# Patient Record
Sex: FEMALE | Race: BLACK OR AFRICAN AMERICAN | NOT HISPANIC OR LATINO | URBAN - METROPOLITAN AREA
[De-identification: names, ages, dates, MRNs, and addresses within clinical notes are randomized per-mention and may not be internally consistent; named-entity substitution may affect disease eponyms.]

---

## 2017-01-01 ENCOUNTER — COMMUNICATION - HEALTHEAST (OUTPATIENT)
Dept: NURSING | Facility: CLINIC | Age: 32
End: 2017-01-01

## 2017-01-01 ENCOUNTER — HOME CARE/HOSPICE - HEALTHEAST (OUTPATIENT)
Dept: HOME HEALTH SERVICES | Facility: HOME HEALTH | Age: 32
End: 2017-01-01

## 2017-01-01 ENCOUNTER — RECORDS - HEALTHEAST (OUTPATIENT)
Dept: ADMINISTRATIVE | Facility: OTHER | Age: 32
End: 2017-01-01

## 2017-01-01 ENCOUNTER — COMMUNICATION - HEALTHEAST (OUTPATIENT)
Dept: HOME HEALTH SERVICES | Facility: HOME HEALTH | Age: 32
End: 2017-01-01

## 2017-01-01 ENCOUNTER — SURGERY - HEALTHEAST (OUTPATIENT)
Dept: GASTROENTEROLOGY | Facility: CLINIC | Age: 32
End: 2017-01-01

## 2017-01-01 ENCOUNTER — OFFICE VISIT - HEALTHEAST (OUTPATIENT)
Dept: BEHAVIORAL HEALTH | Facility: HOSPITAL | Age: 32
End: 2017-01-01

## 2017-01-01 ENCOUNTER — COMMUNICATION - HEALTHEAST (OUTPATIENT)
Dept: INTERNAL MEDICINE | Facility: CLINIC | Age: 32
End: 2017-01-01

## 2017-01-01 ENCOUNTER — COMMUNICATION - HEALTHEAST (OUTPATIENT)
Dept: ADMINISTRATIVE | Facility: CLINIC | Age: 32
End: 2017-01-01

## 2017-01-01 ENCOUNTER — COMMUNICATION - HEALTHEAST (OUTPATIENT)
Dept: ENDOCRINOLOGY | Facility: CLINIC | Age: 32
End: 2017-01-01

## 2017-01-01 ENCOUNTER — OFFICE VISIT - HEALTHEAST (OUTPATIENT)
Dept: ENDOCRINOLOGY | Facility: CLINIC | Age: 32
End: 2017-01-01

## 2017-01-01 ENCOUNTER — AMBULATORY - HEALTHEAST (OUTPATIENT)
Dept: ENDOCRINOLOGY | Facility: CLINIC | Age: 32
End: 2017-01-01

## 2017-01-01 ENCOUNTER — COMMUNICATION - HEALTHEAST (OUTPATIENT)
Dept: CARE COORDINATION | Facility: CLINIC | Age: 32
End: 2017-01-01

## 2017-01-01 ENCOUNTER — AMBULATORY - HEALTHEAST (OUTPATIENT)
Dept: LAB | Facility: CLINIC | Age: 32
End: 2017-01-01

## 2017-01-01 ENCOUNTER — COMMUNICATION - HEALTHEAST (OUTPATIENT)
Dept: BEHAVIORAL HEALTH | Facility: CLINIC | Age: 32
End: 2017-01-01

## 2017-01-01 ENCOUNTER — AMBULATORY - HEALTHEAST (OUTPATIENT)
Dept: PODIATRY | Facility: CLINIC | Age: 32
End: 2017-01-01

## 2017-01-01 ENCOUNTER — OFFICE VISIT - HEALTHEAST (OUTPATIENT)
Dept: INTERNAL MEDICINE | Facility: CLINIC | Age: 32
End: 2017-01-01

## 2017-01-01 ENCOUNTER — AMBULATORY - HEALTHEAST (OUTPATIENT)
Dept: CARE COORDINATION | Facility: CLINIC | Age: 32
End: 2017-01-01

## 2017-01-01 ENCOUNTER — AMBULATORY - HEALTHEAST (OUTPATIENT)
Dept: BEHAVIORAL HEALTH | Facility: CLINIC | Age: 32
End: 2017-01-01

## 2017-01-01 ENCOUNTER — AMBULATORY - HEALTHEAST (OUTPATIENT)
Dept: INTERNAL MEDICINE | Facility: CLINIC | Age: 32
End: 2017-01-01

## 2017-01-01 DIAGNOSIS — F33.1 MAJOR DEPRESSIVE DISORDER, RECURRENT EPISODE, MODERATE (H): ICD-10-CM

## 2017-01-01 DIAGNOSIS — Z00.00 HEALTHCARE MAINTENANCE: ICD-10-CM

## 2017-01-01 DIAGNOSIS — R19.7 DIARRHEA: ICD-10-CM

## 2017-01-01 DIAGNOSIS — E10.22 TYPE 1 DIABETES MELLITUS WITH CHRONIC KIDNEY DISEASE ON CHRONIC DIALYSIS (H): ICD-10-CM

## 2017-01-01 DIAGNOSIS — F43.23 ADJUSTMENT DISORDER WITH MIXED ANXIETY AND DEPRESSED MOOD: ICD-10-CM

## 2017-01-01 DIAGNOSIS — E10.8 TYPE 1 DIABETES MELLITUS WITH COMPLICATION (H): ICD-10-CM

## 2017-01-01 DIAGNOSIS — R19.7 DIARRHEA, UNSPECIFIED TYPE: ICD-10-CM

## 2017-01-01 DIAGNOSIS — H54.7 VISUAL IMPAIRMENT DUE TO DIABETES MELLITUS (H): ICD-10-CM

## 2017-01-01 DIAGNOSIS — E10.21 TYPE 1 DIABETES MELLITUS WITH NEPHROPATHY (H): ICD-10-CM

## 2017-01-01 DIAGNOSIS — E55.9 VITAMIN D DEFICIENCY: ICD-10-CM

## 2017-01-01 DIAGNOSIS — L60.2 ONYCHAUXIS: ICD-10-CM

## 2017-01-01 DIAGNOSIS — E10.42 DIABETIC PERIPHERAL NEUROPATHY ASSOCIATED WITH TYPE 1 DIABETES MELLITUS (H): ICD-10-CM

## 2017-01-01 DIAGNOSIS — G25.81 RLS (RESTLESS LEGS SYNDROME): ICD-10-CM

## 2017-01-01 DIAGNOSIS — E11.39 VISUAL IMPAIRMENT DUE TO DIABETES MELLITUS (H): ICD-10-CM

## 2017-01-01 DIAGNOSIS — N18.6 TYPE 1 DIABETES MELLITUS WITH CHRONIC KIDNEY DISEASE ON CHRONIC DIALYSIS (H): ICD-10-CM

## 2017-01-01 DIAGNOSIS — M79.673 PAIN OF FOOT, UNSPECIFIED LATERALITY: ICD-10-CM

## 2017-01-01 DIAGNOSIS — R13.19 ESOPHAGEAL DYSPHAGIA: ICD-10-CM

## 2017-01-01 DIAGNOSIS — D64.9 ANEMIA, UNSPECIFIED TYPE: ICD-10-CM

## 2017-01-01 DIAGNOSIS — Z09 FOLLOW UP: ICD-10-CM

## 2017-01-01 DIAGNOSIS — Z99.2 TYPE 1 DIABETES MELLITUS WITH CHRONIC KIDNEY DISEASE ON CHRONIC DIALYSIS (H): ICD-10-CM

## 2017-01-01 DIAGNOSIS — K52.9 CHRONIC DIARRHEA: ICD-10-CM

## 2017-01-01 DIAGNOSIS — E10.42 TYPE 1 DIABETES MELLITUS WITH POLYNEUROPATHY (H): ICD-10-CM

## 2017-01-01 DIAGNOSIS — K21.9 GERD (GASTROESOPHAGEAL REFLUX DISEASE): ICD-10-CM

## 2017-01-01 DIAGNOSIS — E10.22 TYPE 1 DIABETES MELLITUS WITH DIABETIC CHRONIC KIDNEY DISEASE (H): ICD-10-CM

## 2017-01-01 LAB
HBA1C MFR BLD: 8.8 % (ref 3.5–6)
LDLC SERPL CALC-MCNC: 78 MG/DL

## 2017-01-01 ASSESSMENT — MIFFLIN-ST. JEOR
SCORE: 1552.26
SCORE: 1584.01
SCORE: 1554.98

## 2017-01-03 ENCOUNTER — OFFICE VISIT - HEALTHEAST (OUTPATIENT)
Dept: ENDOCRINOLOGY | Facility: CLINIC | Age: 32
End: 2017-01-03

## 2017-01-03 ENCOUNTER — AMBULATORY - HEALTHEAST (OUTPATIENT)
Dept: ENDOCRINOLOGY | Facility: CLINIC | Age: 32
End: 2017-01-03

## 2017-01-03 DIAGNOSIS — E10.42 DIABETIC PERIPHERAL NEUROPATHY ASSOCIATED WITH TYPE 1 DIABETES MELLITUS (H): ICD-10-CM

## 2017-01-03 DIAGNOSIS — E11.39 VISUAL IMPAIRMENT DUE TO DIABETES MELLITUS (H): ICD-10-CM

## 2017-01-03 DIAGNOSIS — H54.7 VISUAL IMPAIRMENT DUE TO DIABETES MELLITUS (H): ICD-10-CM

## 2017-01-03 DIAGNOSIS — E10.21 TYPE 1 DIABETES MELLITUS WITH NEPHROPATHY (H): ICD-10-CM

## 2017-01-03 ASSESSMENT — MIFFLIN-ST. JEOR: SCORE: 1546.81

## 2017-01-04 ENCOUNTER — HOME CARE/HOSPICE - HEALTHEAST (OUTPATIENT)
Dept: HOME HEALTH SERVICES | Facility: HOME HEALTH | Age: 32
End: 2017-01-04

## 2017-01-05 ENCOUNTER — HOME CARE/HOSPICE - HEALTHEAST (OUTPATIENT)
Dept: HOME HEALTH SERVICES | Facility: HOME HEALTH | Age: 32
End: 2017-01-05

## 2017-01-07 ENCOUNTER — HOME CARE/HOSPICE - HEALTHEAST (OUTPATIENT)
Dept: HOME HEALTH SERVICES | Facility: HOME HEALTH | Age: 32
End: 2017-01-07

## 2017-01-09 ENCOUNTER — HOME CARE/HOSPICE - HEALTHEAST (OUTPATIENT)
Dept: HOME HEALTH SERVICES | Facility: HOME HEALTH | Age: 32
End: 2017-01-09

## 2017-01-12 ENCOUNTER — HOME CARE/HOSPICE - HEALTHEAST (OUTPATIENT)
Dept: HOME HEALTH SERVICES | Facility: HOME HEALTH | Age: 32
End: 2017-01-12

## 2017-01-16 ENCOUNTER — HOME CARE/HOSPICE - HEALTHEAST (OUTPATIENT)
Dept: HOME HEALTH SERVICES | Facility: HOME HEALTH | Age: 32
End: 2017-01-16

## 2017-01-17 ENCOUNTER — HOME CARE/HOSPICE - HEALTHEAST (OUTPATIENT)
Dept: HOME HEALTH SERVICES | Facility: HOME HEALTH | Age: 32
End: 2017-01-17

## 2017-01-17 ENCOUNTER — COMMUNICATION - HEALTHEAST (OUTPATIENT)
Dept: INTERNAL MEDICINE | Facility: CLINIC | Age: 32
End: 2017-01-17

## 2017-01-18 ENCOUNTER — HOME CARE/HOSPICE - HEALTHEAST (OUTPATIENT)
Dept: HOME HEALTH SERVICES | Facility: HOME HEALTH | Age: 32
End: 2017-01-18

## 2017-01-19 ENCOUNTER — HOME CARE/HOSPICE - HEALTHEAST (OUTPATIENT)
Dept: HOME HEALTH SERVICES | Facility: HOME HEALTH | Age: 32
End: 2017-01-19

## 2017-01-23 ENCOUNTER — COMMUNICATION - HEALTHEAST (OUTPATIENT)
Dept: NURSING | Facility: CLINIC | Age: 32
End: 2017-01-23

## 2017-01-23 ENCOUNTER — OFFICE VISIT - HEALTHEAST (OUTPATIENT)
Dept: PODIATRY | Facility: CLINIC | Age: 32
End: 2017-01-23

## 2017-01-23 ENCOUNTER — HOME CARE/HOSPICE - HEALTHEAST (OUTPATIENT)
Dept: HOME HEALTH SERVICES | Facility: HOME HEALTH | Age: 32
End: 2017-01-23

## 2017-01-23 DIAGNOSIS — E10.42 TYPE 1 DIABETES MELLITUS WITH POLYNEUROPATHY (H): ICD-10-CM

## 2017-01-23 DIAGNOSIS — M79.673 PAIN OF FOOT, UNSPECIFIED LATERALITY: ICD-10-CM

## 2017-01-23 DIAGNOSIS — L60.2 ONYCHAUXIS: ICD-10-CM

## 2017-01-24 ENCOUNTER — COMMUNICATION - HEALTHEAST (OUTPATIENT)
Dept: ENDOCRINOLOGY | Facility: CLINIC | Age: 32
End: 2017-01-24

## 2017-01-24 ENCOUNTER — HOME CARE/HOSPICE - HEALTHEAST (OUTPATIENT)
Dept: HOME HEALTH SERVICES | Facility: HOME HEALTH | Age: 32
End: 2017-01-24

## 2017-01-25 ENCOUNTER — HOME CARE/HOSPICE - HEALTHEAST (OUTPATIENT)
Dept: HOME HEALTH SERVICES | Facility: HOME HEALTH | Age: 32
End: 2017-01-25

## 2017-01-26 ENCOUNTER — OFFICE VISIT - HEALTHEAST (OUTPATIENT)
Dept: BEHAVIORAL HEALTH | Facility: HOSPITAL | Age: 32
End: 2017-01-26

## 2017-01-26 DIAGNOSIS — F33.1 MAJOR DEPRESSIVE DISORDER, RECURRENT EPISODE, MODERATE (H): ICD-10-CM

## 2017-01-26 DIAGNOSIS — F43.23 ADJUSTMENT DISORDER WITH MIXED ANXIETY AND DEPRESSED MOOD: ICD-10-CM

## 2017-01-30 ENCOUNTER — HOME CARE/HOSPICE - HEALTHEAST (OUTPATIENT)
Dept: HOME HEALTH SERVICES | Facility: HOME HEALTH | Age: 32
End: 2017-01-30

## 2017-02-01 ENCOUNTER — HOME CARE/HOSPICE - HEALTHEAST (OUTPATIENT)
Dept: HOME HEALTH SERVICES | Facility: HOME HEALTH | Age: 32
End: 2017-02-01

## 2017-02-02 ENCOUNTER — HOME CARE/HOSPICE - HEALTHEAST (OUTPATIENT)
Dept: HOME HEALTH SERVICES | Facility: HOME HEALTH | Age: 32
End: 2017-02-02

## 2017-02-03 ENCOUNTER — AMBULATORY - HEALTHEAST (OUTPATIENT)
Dept: LAB | Facility: CLINIC | Age: 32
End: 2017-02-03

## 2017-02-03 ENCOUNTER — RECORDS - HEALTHEAST (OUTPATIENT)
Dept: ADMINISTRATIVE | Facility: OTHER | Age: 32
End: 2017-02-03

## 2017-02-03 ENCOUNTER — HOME CARE/HOSPICE - HEALTHEAST (OUTPATIENT)
Dept: HOME HEALTH SERVICES | Facility: HOME HEALTH | Age: 32
End: 2017-02-03

## 2017-02-03 ENCOUNTER — OFFICE VISIT - HEALTHEAST (OUTPATIENT)
Dept: ENDOCRINOLOGY | Facility: CLINIC | Age: 32
End: 2017-02-03

## 2017-02-03 DIAGNOSIS — E10.9 DM TYPE 1 (DIABETES MELLITUS, TYPE 1) (H): ICD-10-CM

## 2017-02-03 DIAGNOSIS — E10.21 TYPE 1 DIABETES MELLITUS WITH NEPHROPATHY (H): ICD-10-CM

## 2017-02-03 LAB — HBA1C MFR BLD: 9.1 % (ref 3.5–6)

## 2017-02-05 ENCOUNTER — COMMUNICATION - HEALTHEAST (OUTPATIENT)
Dept: INTERNAL MEDICINE | Facility: CLINIC | Age: 32
End: 2017-02-05

## 2017-02-06 ENCOUNTER — AMBULATORY - HEALTHEAST (OUTPATIENT)
Dept: INTERNAL MEDICINE | Facility: CLINIC | Age: 32
End: 2017-02-06

## 2017-02-06 ENCOUNTER — HOME CARE/HOSPICE - HEALTHEAST (OUTPATIENT)
Dept: HOME HEALTH SERVICES | Facility: HOME HEALTH | Age: 32
End: 2017-02-06

## 2017-02-06 ENCOUNTER — COMMUNICATION - HEALTHEAST (OUTPATIENT)
Dept: INTERNAL MEDICINE | Facility: CLINIC | Age: 32
End: 2017-02-06

## 2017-02-06 ENCOUNTER — COMMUNICATION - HEALTHEAST (OUTPATIENT)
Dept: ADMINISTRATIVE | Facility: CLINIC | Age: 32
End: 2017-02-06

## 2017-02-06 DIAGNOSIS — G56.00 CTS (CARPAL TUNNEL SYNDROME): ICD-10-CM

## 2017-02-07 ENCOUNTER — AMBULATORY - HEALTHEAST (OUTPATIENT)
Dept: INTERNAL MEDICINE | Facility: CLINIC | Age: 32
End: 2017-02-07

## 2017-02-07 DIAGNOSIS — G56.00 CARPAL TUNNEL SYNDROME: ICD-10-CM

## 2017-02-09 ENCOUNTER — HOME CARE/HOSPICE - HEALTHEAST (OUTPATIENT)
Dept: HOME HEALTH SERVICES | Facility: HOME HEALTH | Age: 32
End: 2017-02-09

## 2017-02-10 ENCOUNTER — COMMUNICATION - HEALTHEAST (OUTPATIENT)
Dept: ENDOCRINOLOGY | Facility: CLINIC | Age: 32
End: 2017-02-10

## 2017-02-10 ENCOUNTER — COMMUNICATION - HEALTHEAST (OUTPATIENT)
Dept: INTERNAL MEDICINE | Facility: CLINIC | Age: 32
End: 2017-02-10

## 2017-02-10 ENCOUNTER — HOME CARE/HOSPICE - HEALTHEAST (OUTPATIENT)
Dept: HOME HEALTH SERVICES | Facility: HOME HEALTH | Age: 32
End: 2017-02-10

## 2017-02-10 ENCOUNTER — OFFICE VISIT - HEALTHEAST (OUTPATIENT)
Dept: BEHAVIORAL HEALTH | Facility: HOSPITAL | Age: 32
End: 2017-02-10

## 2017-02-10 DIAGNOSIS — F33.1 MAJOR DEPRESSIVE DISORDER, RECURRENT EPISODE, MODERATE (H): ICD-10-CM

## 2017-02-10 DIAGNOSIS — E10.22 TYPE 1 DIABETES MELLITUS WITH DIABETIC CHRONIC KIDNEY DISEASE (H): ICD-10-CM

## 2017-02-10 DIAGNOSIS — F43.23 ADJUSTMENT DISORDER WITH MIXED ANXIETY AND DEPRESSED MOOD: ICD-10-CM

## 2017-02-13 ENCOUNTER — AMBULATORY - HEALTHEAST (OUTPATIENT)
Dept: EDUCATION SERVICES | Facility: CLINIC | Age: 32
End: 2017-02-13

## 2017-02-13 ENCOUNTER — HOME CARE/HOSPICE - HEALTHEAST (OUTPATIENT)
Dept: HOME HEALTH SERVICES | Facility: HOME HEALTH | Age: 32
End: 2017-02-13

## 2017-02-13 DIAGNOSIS — E10.9 DM TYPE 1 (DIABETES MELLITUS, TYPE 1) (H): ICD-10-CM

## 2017-02-15 ENCOUNTER — HOSPITAL ENCOUNTER (OUTPATIENT)
Dept: PHYSICAL MEDICINE AND REHAB | Facility: CLINIC | Age: 32
Discharge: HOME | End: 2017-02-15
Attending: INTERNAL MEDICINE

## 2017-02-15 DIAGNOSIS — G56.00 CARPAL TUNNEL SYNDROME: ICD-10-CM

## 2017-02-15 DIAGNOSIS — R20.0 NUMBNESS AND TINGLING IN BOTH HANDS: ICD-10-CM

## 2017-02-15 DIAGNOSIS — R20.2 NUMBNESS AND TINGLING IN BOTH HANDS: ICD-10-CM

## 2017-02-16 ENCOUNTER — COMMUNICATION - HEALTHEAST (OUTPATIENT)
Dept: INTERNAL MEDICINE | Facility: CLINIC | Age: 32
End: 2017-02-16

## 2017-02-16 ENCOUNTER — HOME CARE/HOSPICE - HEALTHEAST (OUTPATIENT)
Dept: HOME HEALTH SERVICES | Facility: HOME HEALTH | Age: 32
End: 2017-02-16

## 2017-02-16 DIAGNOSIS — K21.9 GERD (GASTROESOPHAGEAL REFLUX DISEASE): ICD-10-CM

## 2017-02-17 ENCOUNTER — OFFICE VISIT - HEALTHEAST (OUTPATIENT)
Dept: BEHAVIORAL HEALTH | Facility: HOSPITAL | Age: 32
End: 2017-02-17

## 2017-02-17 DIAGNOSIS — F43.23 ADJUSTMENT DISORDER WITH MIXED ANXIETY AND DEPRESSED MOOD: ICD-10-CM

## 2017-02-17 DIAGNOSIS — F33.1 MAJOR DEPRESSIVE DISORDER, RECURRENT EPISODE, MODERATE (H): ICD-10-CM

## 2017-02-18 ENCOUNTER — HOME CARE/HOSPICE - HEALTHEAST (OUTPATIENT)
Dept: HOME HEALTH SERVICES | Facility: HOME HEALTH | Age: 32
End: 2017-02-18

## 2017-02-20 ENCOUNTER — RECORDS - HEALTHEAST (OUTPATIENT)
Dept: ADMINISTRATIVE | Facility: OTHER | Age: 32
End: 2017-02-20

## 2017-02-22 ENCOUNTER — COMMUNICATION - HEALTHEAST (OUTPATIENT)
Dept: EDUCATION SERVICES | Facility: CLINIC | Age: 32
End: 2017-02-22

## 2017-02-23 ENCOUNTER — HOME CARE/HOSPICE - HEALTHEAST (OUTPATIENT)
Dept: HOME HEALTH SERVICES | Facility: HOME HEALTH | Age: 32
End: 2017-02-23

## 2017-02-23 ENCOUNTER — COMMUNICATION - HEALTHEAST (OUTPATIENT)
Dept: NURSING | Facility: CLINIC | Age: 32
End: 2017-02-23

## 2017-02-24 ENCOUNTER — OFFICE VISIT - HEALTHEAST (OUTPATIENT)
Dept: INTERNAL MEDICINE | Facility: CLINIC | Age: 32
End: 2017-02-24

## 2017-02-24 ENCOUNTER — AMBULATORY - HEALTHEAST (OUTPATIENT)
Dept: BEHAVIORAL HEALTH | Facility: CLINIC | Age: 32
End: 2017-02-24

## 2017-02-24 ENCOUNTER — OFFICE VISIT - HEALTHEAST (OUTPATIENT)
Dept: BEHAVIORAL HEALTH | Facility: HOSPITAL | Age: 32
End: 2017-02-24

## 2017-02-24 DIAGNOSIS — D63.1 ANEMIA IN CKD (CHRONIC KIDNEY DISEASE): ICD-10-CM

## 2017-02-24 DIAGNOSIS — Z96.41 INSULIN PUMP STATUS: ICD-10-CM

## 2017-02-24 DIAGNOSIS — N18.6 ESRD (END STAGE RENAL DISEASE) ON DIALYSIS (H): ICD-10-CM

## 2017-02-24 DIAGNOSIS — F33.1 MAJOR DEPRESSIVE DISORDER, RECURRENT EPISODE, MODERATE (H): ICD-10-CM

## 2017-02-24 DIAGNOSIS — E10.42 TYPE 1 DIABETES MELLITUS WITH POLYNEUROPATHY (H): ICD-10-CM

## 2017-02-24 DIAGNOSIS — E10.42 DIABETIC PERIPHERAL NEUROPATHY ASSOCIATED WITH TYPE 1 DIABETES MELLITUS (H): ICD-10-CM

## 2017-02-24 DIAGNOSIS — E66.01 MORBID OBESITY WITH BMI OF 40.0-44.9, ADULT (H): ICD-10-CM

## 2017-02-24 DIAGNOSIS — N18.9 ANEMIA IN CKD (CHRONIC KIDNEY DISEASE): ICD-10-CM

## 2017-02-24 DIAGNOSIS — E11.39 VISUAL IMPAIRMENT DUE TO DIABETES MELLITUS (H): ICD-10-CM

## 2017-02-24 DIAGNOSIS — H54.7 VISUAL IMPAIRMENT DUE TO DIABETES MELLITUS (H): ICD-10-CM

## 2017-02-24 DIAGNOSIS — Z99.2 ESRD (END STAGE RENAL DISEASE) ON DIALYSIS (H): ICD-10-CM

## 2017-02-24 DIAGNOSIS — F43.23 ADJUSTMENT DISORDER WITH MIXED ANXIETY AND DEPRESSED MOOD: ICD-10-CM

## 2017-02-24 DIAGNOSIS — G56.00 CTS (CARPAL TUNNEL SYNDROME): ICD-10-CM

## 2017-02-27 ENCOUNTER — HOME CARE/HOSPICE - HEALTHEAST (OUTPATIENT)
Dept: HOME HEALTH SERVICES | Facility: HOME HEALTH | Age: 32
End: 2017-02-27

## 2017-02-28 ENCOUNTER — HOME CARE/HOSPICE - HEALTHEAST (OUTPATIENT)
Dept: HOME HEALTH SERVICES | Facility: HOME HEALTH | Age: 32
End: 2017-02-28

## 2017-03-02 ENCOUNTER — COMMUNICATION - HEALTHEAST (OUTPATIENT)
Dept: ENDOCRINOLOGY | Facility: CLINIC | Age: 32
End: 2017-03-02

## 2017-03-02 DIAGNOSIS — E10.21 TYPE 1 DIABETES MELLITUS WITH NEPHROPATHY (H): ICD-10-CM

## 2017-03-03 ENCOUNTER — COMMUNICATION - HEALTHEAST (OUTPATIENT)
Dept: INTERNAL MEDICINE | Facility: CLINIC | Age: 32
End: 2017-03-03

## 2017-03-06 ENCOUNTER — HOME CARE/HOSPICE - HEALTHEAST (OUTPATIENT)
Dept: HOME HEALTH SERVICES | Facility: HOME HEALTH | Age: 32
End: 2017-03-06

## 2017-03-07 ENCOUNTER — OFFICE VISIT - HEALTHEAST (OUTPATIENT)
Dept: ENDOCRINOLOGY | Facility: CLINIC | Age: 32
End: 2017-03-07

## 2017-03-07 ENCOUNTER — RECORDS - HEALTHEAST (OUTPATIENT)
Dept: ADMINISTRATIVE | Facility: OTHER | Age: 32
End: 2017-03-07

## 2017-03-07 DIAGNOSIS — H54.7 VISUAL IMPAIRMENT DUE TO DIABETES MELLITUS (H): ICD-10-CM

## 2017-03-07 DIAGNOSIS — E10.9 DM TYPE 1 (DIABETES MELLITUS, TYPE 1) (H): ICD-10-CM

## 2017-03-07 DIAGNOSIS — E11.39 VISUAL IMPAIRMENT DUE TO DIABETES MELLITUS (H): ICD-10-CM

## 2017-03-07 ASSESSMENT — MIFFLIN-ST. JEOR: SCORE: 1552.26

## 2017-03-09 ENCOUNTER — HOME CARE/HOSPICE - HEALTHEAST (OUTPATIENT)
Dept: HOME HEALTH SERVICES | Facility: HOME HEALTH | Age: 32
End: 2017-03-09

## 2017-03-10 ENCOUNTER — COMMUNICATION - HEALTHEAST (OUTPATIENT)
Dept: BEHAVIORAL HEALTH | Facility: CLINIC | Age: 32
End: 2017-03-10

## 2017-03-11 ENCOUNTER — HOME CARE/HOSPICE - HEALTHEAST (OUTPATIENT)
Dept: HOME HEALTH SERVICES | Facility: HOME HEALTH | Age: 32
End: 2017-03-11

## 2017-03-13 ENCOUNTER — COMMUNICATION - HEALTHEAST (OUTPATIENT)
Dept: ADMINISTRATIVE | Facility: CLINIC | Age: 32
End: 2017-03-13

## 2017-03-13 DIAGNOSIS — E10.42 DIABETIC PERIPHERAL NEUROPATHY ASSOCIATED WITH TYPE 1 DIABETES MELLITUS (H): ICD-10-CM

## 2017-03-13 DIAGNOSIS — E10.21 TYPE 1 DIABETES MELLITUS WITH NEPHROPATHY (H): ICD-10-CM

## 2017-03-14 ENCOUNTER — COMMUNICATION - HEALTHEAST (OUTPATIENT)
Dept: ADMINISTRATIVE | Facility: CLINIC | Age: 32
End: 2017-03-14

## 2017-03-15 ENCOUNTER — HOME CARE/HOSPICE - HEALTHEAST (OUTPATIENT)
Dept: HOME HEALTH SERVICES | Facility: HOME HEALTH | Age: 32
End: 2017-03-15

## 2017-03-16 ENCOUNTER — HOME CARE/HOSPICE - HEALTHEAST (OUTPATIENT)
Dept: HOME HEALTH SERVICES | Facility: HOME HEALTH | Age: 32
End: 2017-03-16

## 2017-03-23 ENCOUNTER — HOME CARE/HOSPICE - HEALTHEAST (OUTPATIENT)
Dept: HOME HEALTH SERVICES | Facility: HOME HEALTH | Age: 32
End: 2017-03-23

## 2017-03-24 ENCOUNTER — OFFICE VISIT - HEALTHEAST (OUTPATIENT)
Dept: BEHAVIORAL HEALTH | Facility: HOSPITAL | Age: 32
End: 2017-03-24

## 2017-03-24 DIAGNOSIS — F33.1 MAJOR DEPRESSIVE DISORDER, RECURRENT EPISODE, MODERATE (H): ICD-10-CM

## 2017-03-24 DIAGNOSIS — F43.23 ADJUSTMENT DISORDER WITH MIXED ANXIETY AND DEPRESSED MOOD: ICD-10-CM

## 2017-03-30 ENCOUNTER — HOME CARE/HOSPICE - HEALTHEAST (OUTPATIENT)
Dept: HOME HEALTH SERVICES | Facility: HOME HEALTH | Age: 32
End: 2017-03-30

## 2017-03-31 ENCOUNTER — HOME CARE/HOSPICE - HEALTHEAST (OUTPATIENT)
Dept: HOME HEALTH SERVICES | Facility: HOME HEALTH | Age: 32
End: 2017-03-31

## 2017-04-01 ENCOUNTER — HOME CARE/HOSPICE - HEALTHEAST (OUTPATIENT)
Dept: HOME HEALTH SERVICES | Facility: HOME HEALTH | Age: 32
End: 2017-04-01

## 2017-04-06 ENCOUNTER — HOME CARE/HOSPICE - HEALTHEAST (OUTPATIENT)
Dept: HOME HEALTH SERVICES | Facility: HOME HEALTH | Age: 32
End: 2017-04-06

## 2017-04-07 ENCOUNTER — OFFICE VISIT - HEALTHEAST (OUTPATIENT)
Dept: INTERNAL MEDICINE | Facility: CLINIC | Age: 32
End: 2017-04-07

## 2017-04-07 ENCOUNTER — OFFICE VISIT - HEALTHEAST (OUTPATIENT)
Dept: BEHAVIORAL HEALTH | Facility: HOSPITAL | Age: 32
End: 2017-04-07

## 2017-04-07 DIAGNOSIS — N63.20 LEFT BREAST MASS: ICD-10-CM

## 2017-04-07 DIAGNOSIS — F33.1 MAJOR DEPRESSIVE DISORDER, RECURRENT EPISODE, MODERATE (H): ICD-10-CM

## 2017-04-07 DIAGNOSIS — F43.23 ADJUSTMENT DISORDER WITH MIXED ANXIETY AND DEPRESSED MOOD: ICD-10-CM

## 2017-04-10 ENCOUNTER — AMBULATORY - HEALTHEAST (OUTPATIENT)
Dept: PODIATRY | Facility: CLINIC | Age: 32
End: 2017-04-10

## 2017-04-10 DIAGNOSIS — L60.2 ONYCHAUXIS: ICD-10-CM

## 2017-04-10 DIAGNOSIS — M79.673 PAIN OF FOOT, UNSPECIFIED LATERALITY: ICD-10-CM

## 2017-04-10 DIAGNOSIS — E10.42 TYPE 1 DIABETES MELLITUS WITH POLYNEUROPATHY (H): ICD-10-CM

## 2017-04-13 ENCOUNTER — HOME CARE/HOSPICE - HEALTHEAST (OUTPATIENT)
Dept: HOME HEALTH SERVICES | Facility: HOME HEALTH | Age: 32
End: 2017-04-13

## 2017-04-14 ENCOUNTER — HOME CARE/HOSPICE - HEALTHEAST (OUTPATIENT)
Dept: HOME HEALTH SERVICES | Facility: HOME HEALTH | Age: 32
End: 2017-04-14

## 2017-04-20 ENCOUNTER — HOME CARE/HOSPICE - HEALTHEAST (OUTPATIENT)
Dept: HOME HEALTH SERVICES | Facility: HOME HEALTH | Age: 32
End: 2017-04-20

## 2017-04-20 ENCOUNTER — COMMUNICATION - HEALTHEAST (OUTPATIENT)
Dept: INTERNAL MEDICINE | Facility: CLINIC | Age: 32
End: 2017-04-20

## 2017-04-21 ENCOUNTER — OFFICE VISIT - HEALTHEAST (OUTPATIENT)
Dept: BEHAVIORAL HEALTH | Facility: HOSPITAL | Age: 32
End: 2017-04-21

## 2017-04-21 ENCOUNTER — COMMUNICATION - HEALTHEAST (OUTPATIENT)
Dept: INTERNAL MEDICINE | Facility: CLINIC | Age: 32
End: 2017-04-21

## 2017-04-21 DIAGNOSIS — F33.1 MAJOR DEPRESSIVE DISORDER, RECURRENT EPISODE, MODERATE (H): ICD-10-CM

## 2017-04-21 DIAGNOSIS — F43.23 ADJUSTMENT DISORDER WITH MIXED ANXIETY AND DEPRESSED MOOD: ICD-10-CM

## 2017-04-24 ENCOUNTER — COMMUNICATION - HEALTHEAST (OUTPATIENT)
Dept: INTERNAL MEDICINE | Facility: CLINIC | Age: 32
End: 2017-04-24

## 2017-04-24 ENCOUNTER — HOME CARE/HOSPICE - HEALTHEAST (OUTPATIENT)
Dept: HOME HEALTH SERVICES | Facility: HOME HEALTH | Age: 32
End: 2017-04-24

## 2017-04-24 DIAGNOSIS — I10 ESSENTIAL HYPERTENSION: ICD-10-CM

## 2017-04-25 ENCOUNTER — OFFICE VISIT - HEALTHEAST (OUTPATIENT)
Dept: ENDOCRINOLOGY | Facility: CLINIC | Age: 32
End: 2017-04-25

## 2017-04-25 DIAGNOSIS — I10 ESSENTIAL HYPERTENSION: ICD-10-CM

## 2017-04-25 DIAGNOSIS — E10.9 DM TYPE 1 (DIABETES MELLITUS, TYPE 1) (H): ICD-10-CM

## 2017-04-25 DIAGNOSIS — E10.42 DIABETIC PERIPHERAL NEUROPATHY ASSOCIATED WITH TYPE 1 DIABETES MELLITUS (H): ICD-10-CM

## 2017-04-25 ASSESSMENT — MIFFLIN-ST. JEOR: SCORE: 1579.47

## 2017-04-26 ENCOUNTER — COMMUNICATION - HEALTHEAST (OUTPATIENT)
Dept: NURSING | Facility: CLINIC | Age: 32
End: 2017-04-26

## 2017-04-28 ENCOUNTER — HOME CARE/HOSPICE - HEALTHEAST (OUTPATIENT)
Dept: HOME HEALTH SERVICES | Facility: HOME HEALTH | Age: 32
End: 2017-04-28

## 2017-05-02 ENCOUNTER — COMMUNICATION - HEALTHEAST (OUTPATIENT)
Dept: INTERNAL MEDICINE | Facility: CLINIC | Age: 32
End: 2017-05-02

## 2017-05-04 ENCOUNTER — HOME CARE/HOSPICE - HEALTHEAST (OUTPATIENT)
Dept: HOME HEALTH SERVICES | Facility: HOME HEALTH | Age: 32
End: 2017-05-04

## 2017-05-05 ENCOUNTER — OFFICE VISIT - HEALTHEAST (OUTPATIENT)
Dept: BEHAVIORAL HEALTH | Facility: HOSPITAL | Age: 32
End: 2017-05-05

## 2017-05-05 ENCOUNTER — HOME CARE/HOSPICE - HEALTHEAST (OUTPATIENT)
Dept: HOME HEALTH SERVICES | Facility: HOME HEALTH | Age: 32
End: 2017-05-05

## 2017-05-05 ENCOUNTER — AMBULATORY - HEALTHEAST (OUTPATIENT)
Dept: ENDOCRINOLOGY | Facility: CLINIC | Age: 32
End: 2017-05-05

## 2017-05-05 ENCOUNTER — COMMUNICATION - HEALTHEAST (OUTPATIENT)
Dept: HOME HEALTH SERVICES | Facility: HOME HEALTH | Age: 32
End: 2017-05-05

## 2017-05-05 ENCOUNTER — COMMUNICATION - HEALTHEAST (OUTPATIENT)
Dept: ADMINISTRATIVE | Facility: CLINIC | Age: 32
End: 2017-05-05

## 2017-05-05 DIAGNOSIS — F43.23 ADJUSTMENT DISORDER WITH MIXED ANXIETY AND DEPRESSED MOOD: ICD-10-CM

## 2017-05-05 DIAGNOSIS — N18.6 TYPE 1 DIABETES MELLITUS WITH CHRONIC KIDNEY DISEASE ON CHRONIC DIALYSIS (H): ICD-10-CM

## 2017-05-05 DIAGNOSIS — E10.22 TYPE 1 DIABETES MELLITUS WITH CHRONIC KIDNEY DISEASE ON CHRONIC DIALYSIS (H): ICD-10-CM

## 2017-05-05 DIAGNOSIS — F33.1 MAJOR DEPRESSIVE DISORDER, RECURRENT EPISODE, MODERATE (H): ICD-10-CM

## 2017-05-05 DIAGNOSIS — Z99.2 TYPE 1 DIABETES MELLITUS WITH CHRONIC KIDNEY DISEASE ON CHRONIC DIALYSIS (H): ICD-10-CM

## 2017-05-06 ENCOUNTER — HOME CARE/HOSPICE - HEALTHEAST (OUTPATIENT)
Dept: HOME HEALTH SERVICES | Facility: HOME HEALTH | Age: 32
End: 2017-05-06

## 2017-05-08 ENCOUNTER — OFFICE VISIT - HEALTHEAST (OUTPATIENT)
Dept: EDUCATION SERVICES | Facility: CLINIC | Age: 32
End: 2017-05-08

## 2017-05-08 DIAGNOSIS — E10.9 DM TYPE 1 (DIABETES MELLITUS, TYPE 1) (H): ICD-10-CM

## 2017-05-12 ENCOUNTER — COMMUNICATION - HEALTHEAST (OUTPATIENT)
Dept: INTERNAL MEDICINE | Facility: CLINIC | Age: 32
End: 2017-05-12

## 2017-05-12 ENCOUNTER — HOME CARE/HOSPICE - HEALTHEAST (OUTPATIENT)
Dept: HOME HEALTH SERVICES | Facility: HOME HEALTH | Age: 32
End: 2017-05-12

## 2017-05-12 DIAGNOSIS — K21.9 GERD (GASTROESOPHAGEAL REFLUX DISEASE): ICD-10-CM

## 2017-05-13 ENCOUNTER — HOME CARE/HOSPICE - HEALTHEAST (OUTPATIENT)
Dept: HOME HEALTH SERVICES | Facility: HOME HEALTH | Age: 32
End: 2017-05-13

## 2017-05-18 ENCOUNTER — HOME CARE/HOSPICE - HEALTHEAST (OUTPATIENT)
Dept: HOME HEALTH SERVICES | Facility: HOME HEALTH | Age: 32
End: 2017-05-18

## 2017-05-19 ENCOUNTER — COMMUNICATION - HEALTHEAST (OUTPATIENT)
Dept: INTERNAL MEDICINE | Facility: CLINIC | Age: 32
End: 2017-05-19

## 2017-05-19 ENCOUNTER — HOME CARE/HOSPICE - HEALTHEAST (OUTPATIENT)
Dept: HOME HEALTH SERVICES | Facility: HOME HEALTH | Age: 32
End: 2017-05-19

## 2017-05-19 ENCOUNTER — OFFICE VISIT - HEALTHEAST (OUTPATIENT)
Dept: BEHAVIORAL HEALTH | Facility: HOSPITAL | Age: 32
End: 2017-05-19

## 2017-05-19 DIAGNOSIS — F33.1 MAJOR DEPRESSIVE DISORDER, RECURRENT EPISODE, MODERATE (H): ICD-10-CM

## 2017-05-19 DIAGNOSIS — F43.23 ADJUSTMENT DISORDER WITH MIXED ANXIETY AND DEPRESSED MOOD: ICD-10-CM

## 2017-05-19 DIAGNOSIS — I10 HYPERTENSION: ICD-10-CM

## 2017-05-21 ENCOUNTER — COMMUNICATION - HEALTHEAST (OUTPATIENT)
Dept: HOME HEALTH SERVICES | Facility: HOME HEALTH | Age: 32
End: 2017-05-21

## 2017-05-25 ENCOUNTER — HOME CARE/HOSPICE - HEALTHEAST (OUTPATIENT)
Dept: HOME HEALTH SERVICES | Facility: HOME HEALTH | Age: 32
End: 2017-05-25

## 2017-05-26 ENCOUNTER — RECORDS - HEALTHEAST (OUTPATIENT)
Dept: ADMINISTRATIVE | Facility: OTHER | Age: 32
End: 2017-05-26

## 2017-05-26 ENCOUNTER — HOME CARE/HOSPICE - HEALTHEAST (OUTPATIENT)
Dept: HOME HEALTH SERVICES | Facility: HOME HEALTH | Age: 32
End: 2017-05-26

## 2017-06-01 ENCOUNTER — HOME CARE/HOSPICE - HEALTHEAST (OUTPATIENT)
Dept: HOME HEALTH SERVICES | Facility: HOME HEALTH | Age: 32
End: 2017-06-01

## 2017-06-02 ENCOUNTER — OFFICE VISIT - HEALTHEAST (OUTPATIENT)
Dept: BEHAVIORAL HEALTH | Facility: HOSPITAL | Age: 32
End: 2017-06-02

## 2017-06-02 ENCOUNTER — HOME CARE/HOSPICE - HEALTHEAST (OUTPATIENT)
Dept: HOME HEALTH SERVICES | Facility: HOME HEALTH | Age: 32
End: 2017-06-02

## 2017-06-02 DIAGNOSIS — F33.1 MAJOR DEPRESSIVE DISORDER, RECURRENT EPISODE, MODERATE (H): ICD-10-CM

## 2017-06-02 DIAGNOSIS — F43.23 ADJUSTMENT DISORDER WITH MIXED ANXIETY AND DEPRESSED MOOD: ICD-10-CM

## 2017-06-03 ENCOUNTER — COMMUNICATION - HEALTHEAST (OUTPATIENT)
Dept: HOME HEALTH SERVICES | Facility: HOME HEALTH | Age: 32
End: 2017-06-03

## 2017-06-06 ENCOUNTER — COMMUNICATION - HEALTHEAST (OUTPATIENT)
Dept: INTERNAL MEDICINE | Facility: CLINIC | Age: 32
End: 2017-06-06

## 2017-06-08 ENCOUNTER — HOME CARE/HOSPICE - HEALTHEAST (OUTPATIENT)
Dept: HOME HEALTH SERVICES | Facility: HOME HEALTH | Age: 32
End: 2017-06-08

## 2017-06-08 ENCOUNTER — COMMUNICATION - HEALTHEAST (OUTPATIENT)
Dept: INTERNAL MEDICINE | Facility: CLINIC | Age: 32
End: 2017-06-08

## 2017-06-09 ENCOUNTER — COMMUNICATION - HEALTHEAST (OUTPATIENT)
Dept: ENDOCRINOLOGY | Facility: CLINIC | Age: 32
End: 2017-06-09

## 2017-06-09 ENCOUNTER — AMBULATORY - HEALTHEAST (OUTPATIENT)
Dept: ENDOCRINOLOGY | Facility: CLINIC | Age: 32
End: 2017-06-09

## 2017-06-09 ENCOUNTER — HOME CARE/HOSPICE - HEALTHEAST (OUTPATIENT)
Dept: HOME HEALTH SERVICES | Facility: HOME HEALTH | Age: 32
End: 2017-06-09

## 2017-06-09 DIAGNOSIS — N18.6 TYPE 1 DIABETES MELLITUS WITH CHRONIC KIDNEY DISEASE ON CHRONIC DIALYSIS (H): ICD-10-CM

## 2017-06-09 DIAGNOSIS — E10.22 TYPE 1 DIABETES MELLITUS WITH CHRONIC KIDNEY DISEASE ON CHRONIC DIALYSIS (H): ICD-10-CM

## 2017-06-09 DIAGNOSIS — Z99.2 TYPE 1 DIABETES MELLITUS WITH CHRONIC KIDNEY DISEASE ON CHRONIC DIALYSIS (H): ICD-10-CM

## 2017-06-11 ENCOUNTER — COMMUNICATION - HEALTHEAST (OUTPATIENT)
Dept: HOME HEALTH SERVICES | Facility: HOME HEALTH | Age: 32
End: 2017-06-11

## 2017-06-15 ENCOUNTER — HOME CARE/HOSPICE - HEALTHEAST (OUTPATIENT)
Dept: HOME HEALTH SERVICES | Facility: HOME HEALTH | Age: 32
End: 2017-06-15

## 2017-06-16 ENCOUNTER — AMBULATORY - HEALTHEAST (OUTPATIENT)
Dept: BEHAVIORAL HEALTH | Facility: CLINIC | Age: 32
End: 2017-06-16

## 2017-06-16 ENCOUNTER — COMMUNICATION - HEALTHEAST (OUTPATIENT)
Dept: HOME HEALTH SERVICES | Facility: HOME HEALTH | Age: 32
End: 2017-06-16

## 2017-06-16 ENCOUNTER — HOME CARE/HOSPICE - HEALTHEAST (OUTPATIENT)
Dept: HOME HEALTH SERVICES | Facility: HOME HEALTH | Age: 32
End: 2017-06-16

## 2017-06-16 ENCOUNTER — OFFICE VISIT - HEALTHEAST (OUTPATIENT)
Dept: BEHAVIORAL HEALTH | Facility: HOSPITAL | Age: 32
End: 2017-06-16

## 2017-06-16 DIAGNOSIS — F33.1 MAJOR DEPRESSIVE DISORDER, RECURRENT EPISODE, MODERATE (H): ICD-10-CM

## 2017-06-16 DIAGNOSIS — F43.23 ADJUSTMENT DISORDER WITH MIXED ANXIETY AND DEPRESSED MOOD: ICD-10-CM

## 2017-06-22 ENCOUNTER — HOME CARE/HOSPICE - HEALTHEAST (OUTPATIENT)
Dept: HOME HEALTH SERVICES | Facility: HOME HEALTH | Age: 32
End: 2017-06-22

## 2017-06-23 ENCOUNTER — HOME CARE/HOSPICE - HEALTHEAST (OUTPATIENT)
Dept: HOME HEALTH SERVICES | Facility: HOME HEALTH | Age: 32
End: 2017-06-23

## 2017-06-23 ENCOUNTER — COMMUNICATION - HEALTHEAST (OUTPATIENT)
Dept: SCHEDULING | Facility: CLINIC | Age: 32
End: 2017-06-23

## 2017-06-23 ENCOUNTER — OFFICE VISIT - HEALTHEAST (OUTPATIENT)
Dept: INTERNAL MEDICINE | Facility: CLINIC | Age: 32
End: 2017-06-23

## 2017-06-23 ENCOUNTER — COMMUNICATION - HEALTHEAST (OUTPATIENT)
Dept: INTERNAL MEDICINE | Facility: CLINIC | Age: 32
End: 2017-06-23

## 2017-06-23 DIAGNOSIS — Z99.2 ESRD (END STAGE RENAL DISEASE) ON DIALYSIS (H): ICD-10-CM

## 2017-06-23 DIAGNOSIS — Z99.2 TYPE 1 DIABETES MELLITUS WITH CHRONIC KIDNEY DISEASE ON CHRONIC DIALYSIS (H): ICD-10-CM

## 2017-06-23 DIAGNOSIS — E10.22 TYPE 1 DIABETES MELLITUS WITH CHRONIC KIDNEY DISEASE ON CHRONIC DIALYSIS (H): ICD-10-CM

## 2017-06-23 DIAGNOSIS — Z76.89 ENCOUNTER TO ESTABLISH CARE: ICD-10-CM

## 2017-06-23 DIAGNOSIS — N18.6 ESRD (END STAGE RENAL DISEASE) ON DIALYSIS (H): ICD-10-CM

## 2017-06-23 DIAGNOSIS — K21.9 GERD (GASTROESOPHAGEAL REFLUX DISEASE): ICD-10-CM

## 2017-06-23 DIAGNOSIS — I10 ESSENTIAL HYPERTENSION WITH GOAL BLOOD PRESSURE LESS THAN 130/85: ICD-10-CM

## 2017-06-23 DIAGNOSIS — N18.6 TYPE 1 DIABETES MELLITUS WITH CHRONIC KIDNEY DISEASE ON CHRONIC DIALYSIS (H): ICD-10-CM

## 2017-06-23 LAB — HBA1C MFR BLD: 10 % (ref 3.5–6)

## 2017-06-26 ENCOUNTER — AMBULATORY - HEALTHEAST (OUTPATIENT)
Dept: PODIATRY | Facility: CLINIC | Age: 32
End: 2017-06-26

## 2017-06-26 ENCOUNTER — HOME CARE/HOSPICE - HEALTHEAST (OUTPATIENT)
Dept: HOME HEALTH SERVICES | Facility: HOME HEALTH | Age: 32
End: 2017-06-26

## 2017-06-26 ENCOUNTER — AMBULATORY - HEALTHEAST (OUTPATIENT)
Dept: LAB | Facility: CLINIC | Age: 32
End: 2017-06-26

## 2017-06-26 DIAGNOSIS — M79.673 PAIN OF FOOT, UNSPECIFIED LATERALITY: ICD-10-CM

## 2017-06-26 DIAGNOSIS — E10.42 TYPE 1 DIABETES MELLITUS WITH POLYNEUROPATHY (H): ICD-10-CM

## 2017-06-26 DIAGNOSIS — L60.2 ONYCHAUXIS: ICD-10-CM

## 2017-06-26 DIAGNOSIS — E10.9 DM TYPE 1 (DIABETES MELLITUS, TYPE 1) (H): ICD-10-CM

## 2017-06-29 ENCOUNTER — HOME CARE/HOSPICE - HEALTHEAST (OUTPATIENT)
Dept: HOME HEALTH SERVICES | Facility: HOME HEALTH | Age: 32
End: 2017-06-29

## 2017-06-30 ENCOUNTER — HOME CARE/HOSPICE - HEALTHEAST (OUTPATIENT)
Dept: HOME HEALTH SERVICES | Facility: HOME HEALTH | Age: 32
End: 2017-06-30

## 2017-07-03 ENCOUNTER — COMMUNICATION - HEALTHEAST (OUTPATIENT)
Dept: HOME HEALTH SERVICES | Facility: HOME HEALTH | Age: 32
End: 2017-07-03

## 2017-07-03 ENCOUNTER — COMMUNICATION - HEALTHEAST (OUTPATIENT)
Dept: NURSING | Facility: CLINIC | Age: 32
End: 2017-07-03

## 2017-07-06 ENCOUNTER — OFFICE VISIT - HEALTHEAST (OUTPATIENT)
Dept: ENDOCRINOLOGY | Facility: CLINIC | Age: 32
End: 2017-07-06

## 2017-07-06 ENCOUNTER — HOME CARE/HOSPICE - HEALTHEAST (OUTPATIENT)
Dept: HOME HEALTH SERVICES | Facility: HOME HEALTH | Age: 32
End: 2017-07-06

## 2017-07-06 ENCOUNTER — RECORDS - HEALTHEAST (OUTPATIENT)
Dept: ADMINISTRATIVE | Facility: OTHER | Age: 32
End: 2017-07-06

## 2017-07-06 DIAGNOSIS — E11.39 VISUAL IMPAIRMENT DUE TO DIABETES MELLITUS (H): ICD-10-CM

## 2017-07-06 DIAGNOSIS — H54.7 VISUAL IMPAIRMENT DUE TO DIABETES MELLITUS (H): ICD-10-CM

## 2017-07-06 DIAGNOSIS — E10.9 DM TYPE 1 (DIABETES MELLITUS, TYPE 1) (H): ICD-10-CM

## 2017-07-06 ASSESSMENT — MIFFLIN-ST. JEOR: SCORE: 1573.12

## 2017-07-07 ENCOUNTER — COMMUNICATION - HEALTHEAST (OUTPATIENT)
Dept: BEHAVIORAL HEALTH | Facility: CLINIC | Age: 32
End: 2017-07-07

## 2017-07-07 ENCOUNTER — HOME CARE/HOSPICE - HEALTHEAST (OUTPATIENT)
Dept: HOME HEALTH SERVICES | Facility: HOME HEALTH | Age: 32
End: 2017-07-07

## 2017-07-07 ENCOUNTER — COMMUNICATION - HEALTHEAST (OUTPATIENT)
Dept: ADMINISTRATIVE | Facility: CLINIC | Age: 32
End: 2017-07-07

## 2017-07-10 ENCOUNTER — COMMUNICATION - HEALTHEAST (OUTPATIENT)
Dept: CARE COORDINATION | Facility: CLINIC | Age: 32
End: 2017-07-10

## 2017-07-10 ENCOUNTER — COMMUNICATION - HEALTHEAST (OUTPATIENT)
Dept: NURSING | Facility: CLINIC | Age: 32
End: 2017-07-10

## 2017-07-13 ENCOUNTER — HOME CARE/HOSPICE - HEALTHEAST (OUTPATIENT)
Dept: HOME HEALTH SERVICES | Facility: HOME HEALTH | Age: 32
End: 2017-07-13

## 2017-07-14 ENCOUNTER — HOME CARE/HOSPICE - HEALTHEAST (OUTPATIENT)
Dept: HOME HEALTH SERVICES | Facility: HOME HEALTH | Age: 32
End: 2017-07-14

## 2017-07-21 ENCOUNTER — HOME CARE/HOSPICE - HEALTHEAST (OUTPATIENT)
Dept: HOME HEALTH SERVICES | Facility: HOME HEALTH | Age: 32
End: 2017-07-21

## 2017-07-21 ENCOUNTER — OFFICE VISIT - HEALTHEAST (OUTPATIENT)
Dept: BEHAVIORAL HEALTH | Facility: HOSPITAL | Age: 32
End: 2017-07-21

## 2017-07-21 DIAGNOSIS — F33.1 MAJOR DEPRESSIVE DISORDER, RECURRENT EPISODE, MODERATE (H): ICD-10-CM

## 2017-07-21 DIAGNOSIS — F43.23 ADJUSTMENT DISORDER WITH MIXED ANXIETY AND DEPRESSED MOOD: ICD-10-CM

## 2017-07-22 ENCOUNTER — HOME CARE/HOSPICE - HEALTHEAST (OUTPATIENT)
Dept: HOME HEALTH SERVICES | Facility: HOME HEALTH | Age: 32
End: 2017-07-22

## 2017-07-24 ENCOUNTER — HOME CARE/HOSPICE - HEALTHEAST (OUTPATIENT)
Dept: HOME HEALTH SERVICES | Facility: HOME HEALTH | Age: 32
End: 2017-07-24

## 2017-07-25 ENCOUNTER — RECORDS - HEALTHEAST (OUTPATIENT)
Dept: ADMINISTRATIVE | Facility: OTHER | Age: 32
End: 2017-07-25

## 2017-07-27 ENCOUNTER — AMBULATORY - HEALTHEAST (OUTPATIENT)
Dept: ENDOCRINOLOGY | Facility: CLINIC | Age: 32
End: 2017-07-27

## 2017-07-27 DIAGNOSIS — Z99.2 TYPE 1 DIABETES MELLITUS WITH CHRONIC KIDNEY DISEASE ON CHRONIC DIALYSIS (H): ICD-10-CM

## 2017-07-27 DIAGNOSIS — E10.22 TYPE 1 DIABETES MELLITUS WITH CHRONIC KIDNEY DISEASE ON CHRONIC DIALYSIS (H): ICD-10-CM

## 2017-07-27 DIAGNOSIS — N18.6 TYPE 1 DIABETES MELLITUS WITH CHRONIC KIDNEY DISEASE ON CHRONIC DIALYSIS (H): ICD-10-CM

## 2017-07-28 ENCOUNTER — COMMUNICATION - HEALTHEAST (OUTPATIENT)
Dept: HOME HEALTH SERVICES | Facility: HOME HEALTH | Age: 32
End: 2017-07-28

## 2017-07-28 ENCOUNTER — HOME CARE/HOSPICE - HEALTHEAST (OUTPATIENT)
Dept: HOME HEALTH SERVICES | Facility: HOME HEALTH | Age: 32
End: 2017-07-28

## 2017-07-28 DIAGNOSIS — R19.7 DIARRHEA: ICD-10-CM

## 2017-07-31 ENCOUNTER — COMMUNICATION - HEALTHEAST (OUTPATIENT)
Dept: HOME HEALTH SERVICES | Facility: HOME HEALTH | Age: 32
End: 2017-07-31

## 2017-08-04 ENCOUNTER — HOME CARE/HOSPICE - HEALTHEAST (OUTPATIENT)
Dept: HOME HEALTH SERVICES | Facility: HOME HEALTH | Age: 32
End: 2017-08-04

## 2017-08-10 ENCOUNTER — HOME CARE/HOSPICE - HEALTHEAST (OUTPATIENT)
Dept: HOME HEALTH SERVICES | Facility: HOME HEALTH | Age: 32
End: 2017-08-10

## 2017-08-10 ENCOUNTER — COMMUNICATION - HEALTHEAST (OUTPATIENT)
Dept: INTERNAL MEDICINE | Facility: CLINIC | Age: 32
End: 2017-08-10

## 2017-08-10 DIAGNOSIS — I10 ESSENTIAL HYPERTENSION: ICD-10-CM

## 2017-08-11 ENCOUNTER — OFFICE VISIT - HEALTHEAST (OUTPATIENT)
Dept: BEHAVIORAL HEALTH | Facility: HOSPITAL | Age: 32
End: 2017-08-11

## 2017-08-11 DIAGNOSIS — F33.1 MAJOR DEPRESSIVE DISORDER, RECURRENT EPISODE, MODERATE (H): ICD-10-CM

## 2017-08-11 DIAGNOSIS — F43.23 ADJUSTMENT DISORDER WITH MIXED ANXIETY AND DEPRESSED MOOD: ICD-10-CM

## 2017-08-15 ENCOUNTER — OFFICE VISIT - HEALTHEAST (OUTPATIENT)
Dept: ENDOCRINOLOGY | Facility: CLINIC | Age: 32
End: 2017-08-15

## 2017-08-15 DIAGNOSIS — R19.7 DIARRHEA, UNSPECIFIED TYPE: ICD-10-CM

## 2017-08-15 DIAGNOSIS — E10.9 DM TYPE 1 (DIABETES MELLITUS, TYPE 1) (H): ICD-10-CM

## 2017-08-15 ASSESSMENT — MIFFLIN-ST. JEOR: SCORE: 1599.43

## 2017-08-17 ENCOUNTER — COMMUNICATION - HEALTHEAST (OUTPATIENT)
Dept: HOME HEALTH SERVICES | Facility: HOME HEALTH | Age: 32
End: 2017-08-17

## 2017-08-17 ENCOUNTER — HOME CARE/HOSPICE - HEALTHEAST (OUTPATIENT)
Dept: HOME HEALTH SERVICES | Facility: HOME HEALTH | Age: 32
End: 2017-08-17

## 2017-08-21 ENCOUNTER — OFFICE VISIT - HEALTHEAST (OUTPATIENT)
Dept: INTERNAL MEDICINE | Facility: CLINIC | Age: 32
End: 2017-08-21

## 2017-08-21 DIAGNOSIS — R19.7 DIARRHEA: ICD-10-CM

## 2017-08-24 ENCOUNTER — HOME CARE/HOSPICE - HEALTHEAST (OUTPATIENT)
Dept: HOME HEALTH SERVICES | Facility: HOME HEALTH | Age: 32
End: 2017-08-24

## 2017-08-25 ENCOUNTER — HOME CARE/HOSPICE - HEALTHEAST (OUTPATIENT)
Dept: HOME HEALTH SERVICES | Facility: HOME HEALTH | Age: 32
End: 2017-08-25

## 2017-08-28 ENCOUNTER — HOME CARE/HOSPICE - HEALTHEAST (OUTPATIENT)
Dept: HOME HEALTH SERVICES | Facility: HOME HEALTH | Age: 32
End: 2017-08-28

## 2017-09-02 ENCOUNTER — HOME CARE/HOSPICE - HEALTHEAST (OUTPATIENT)
Dept: HOME HEALTH SERVICES | Facility: HOME HEALTH | Age: 32
End: 2017-09-02

## 2017-09-03 ENCOUNTER — COMMUNICATION - HEALTHEAST (OUTPATIENT)
Dept: INTERNAL MEDICINE | Facility: CLINIC | Age: 32
End: 2017-09-03

## 2017-09-05 ENCOUNTER — OFFICE VISIT - HEALTHEAST (OUTPATIENT)
Dept: INTERNAL MEDICINE | Facility: CLINIC | Age: 32
End: 2017-09-05

## 2017-09-05 DIAGNOSIS — Z09 HOSPITAL DISCHARGE FOLLOW-UP: ICD-10-CM

## 2017-09-05 DIAGNOSIS — K52.9 GASTROENTERITIS: ICD-10-CM

## 2017-09-06 ENCOUNTER — COMMUNICATION - HEALTHEAST (OUTPATIENT)
Dept: NURSING | Facility: CLINIC | Age: 32
End: 2017-09-06

## 2017-09-07 ENCOUNTER — HOME CARE/HOSPICE - HEALTHEAST (OUTPATIENT)
Dept: HOME HEALTH SERVICES | Facility: HOME HEALTH | Age: 32
End: 2017-09-07

## 2017-09-07 ENCOUNTER — OFFICE VISIT - HEALTHEAST (OUTPATIENT)
Dept: BEHAVIORAL HEALTH | Facility: HOSPITAL | Age: 32
End: 2017-09-07

## 2017-09-07 DIAGNOSIS — F43.23 ADJUSTMENT DISORDER WITH MIXED ANXIETY AND DEPRESSED MOOD: ICD-10-CM

## 2017-09-07 DIAGNOSIS — F33.1 MAJOR DEPRESSIVE DISORDER, RECURRENT EPISODE, MODERATE (H): ICD-10-CM

## 2018-01-01 ENCOUNTER — OFFICE VISIT - HEALTHEAST (OUTPATIENT)
Dept: INTERNAL MEDICINE | Facility: CLINIC | Age: 33
End: 2018-01-01

## 2018-01-01 ENCOUNTER — HOME CARE/HOSPICE - HEALTHEAST (OUTPATIENT)
Dept: HOME HEALTH SERVICES | Facility: HOME HEALTH | Age: 33
End: 2018-01-01

## 2018-01-01 ENCOUNTER — COMMUNICATION - HEALTHEAST (OUTPATIENT)
Dept: ADMINISTRATIVE | Facility: CLINIC | Age: 33
End: 2018-01-01

## 2018-01-01 ENCOUNTER — COMMUNICATION - HEALTHEAST (OUTPATIENT)
Dept: ENDOCRINOLOGY | Facility: CLINIC | Age: 33
End: 2018-01-01

## 2018-01-01 ENCOUNTER — COMMUNICATION - HEALTHEAST (OUTPATIENT)
Dept: INTERNAL MEDICINE | Facility: CLINIC | Age: 33
End: 2018-01-01

## 2018-01-01 ENCOUNTER — OFFICE VISIT - HEALTHEAST (OUTPATIENT)
Dept: ENDOCRINOLOGY | Facility: CLINIC | Age: 33
End: 2018-01-01

## 2018-01-01 ENCOUNTER — AMBULATORY - HEALTHEAST (OUTPATIENT)
Dept: BEHAVIORAL HEALTH | Facility: CLINIC | Age: 33
End: 2018-01-01

## 2018-01-01 ENCOUNTER — AMBULATORY - HEALTHEAST (OUTPATIENT)
Dept: PODIATRY | Facility: CLINIC | Age: 33
End: 2018-01-01

## 2018-01-01 ENCOUNTER — HOSPITAL ENCOUNTER (OUTPATIENT)
Dept: MAMMOGRAPHY | Facility: HOSPITAL | Age: 33
Discharge: HOME | End: 2018-02-01

## 2018-01-01 ENCOUNTER — AMBULATORY - HEALTHEAST (OUTPATIENT)
Dept: ENDOCRINOLOGY | Facility: CLINIC | Age: 33
End: 2018-01-01

## 2018-01-01 ENCOUNTER — OFFICE VISIT - HEALTHEAST (OUTPATIENT)
Dept: BEHAVIORAL HEALTH | Facility: HOSPITAL | Age: 33
End: 2018-01-01

## 2018-01-01 ENCOUNTER — COMMUNICATION - HEALTHEAST (OUTPATIENT)
Dept: HOME HEALTH SERVICES | Facility: HOME HEALTH | Age: 33
End: 2018-01-01

## 2018-01-01 ENCOUNTER — COMMUNICATION - HEALTHEAST (OUTPATIENT)
Dept: BEHAVIORAL HEALTH | Facility: CLINIC | Age: 33
End: 2018-01-01

## 2018-01-01 ENCOUNTER — COMMUNICATION - HEALTHEAST (OUTPATIENT)
Dept: NURSING | Facility: CLINIC | Age: 33
End: 2018-01-01

## 2018-01-01 ENCOUNTER — RECORDS - HEALTHEAST (OUTPATIENT)
Dept: ADMINISTRATIVE | Facility: OTHER | Age: 33
End: 2018-01-01

## 2018-01-01 ENCOUNTER — OFFICE VISIT - HEALTHEAST (OUTPATIENT)
Dept: FAMILY MEDICINE | Facility: CLINIC | Age: 33
End: 2018-01-01

## 2018-01-01 ENCOUNTER — COMMUNICATION - HEALTHEAST (OUTPATIENT)
Dept: EDUCATION SERVICES | Facility: CLINIC | Age: 33
End: 2018-01-01

## 2018-01-01 ENCOUNTER — OFFICE VISIT - HEALTHEAST (OUTPATIENT)
Dept: EDUCATION SERVICES | Facility: CLINIC | Age: 33
End: 2018-01-01

## 2018-01-01 ENCOUNTER — OFFICE VISIT - HEALTHEAST (OUTPATIENT)
Dept: PODIATRY | Facility: CLINIC | Age: 33
End: 2018-01-01

## 2018-01-01 ENCOUNTER — COMMUNICATION - HEALTHEAST (OUTPATIENT)
Dept: BEHAVIORAL HEALTH | Facility: HOSPITAL | Age: 33
End: 2018-01-01

## 2018-01-01 ENCOUNTER — AMBULATORY - HEALTHEAST (OUTPATIENT)
Dept: LAB | Facility: CLINIC | Age: 33
End: 2018-01-01

## 2018-01-01 ENCOUNTER — COMMUNICATION - HEALTHEAST (OUTPATIENT)
Dept: CARE COORDINATION | Facility: CLINIC | Age: 33
End: 2018-01-01

## 2018-01-01 DIAGNOSIS — E10.42 DIABETIC PERIPHERAL NEUROPATHY ASSOCIATED WITH TYPE 1 DIABETES MELLITUS (H): ICD-10-CM

## 2018-01-01 DIAGNOSIS — F43.23 ADJUSTMENT DISORDER WITH MIXED ANXIETY AND DEPRESSED MOOD: ICD-10-CM

## 2018-01-01 DIAGNOSIS — Z09 HOSPITAL DISCHARGE FOLLOW-UP: ICD-10-CM

## 2018-01-01 DIAGNOSIS — E87.5 HYPERKALEMIA: ICD-10-CM

## 2018-01-01 DIAGNOSIS — M79.673 PAIN OF FOOT, UNSPECIFIED LATERALITY: ICD-10-CM

## 2018-01-01 DIAGNOSIS — E11.39 VISUAL IMPAIRMENT DUE TO DIABETES MELLITUS (H): ICD-10-CM

## 2018-01-01 DIAGNOSIS — Z96.41 INSULIN PUMP STATUS: ICD-10-CM

## 2018-01-01 DIAGNOSIS — Z99.2 ESRD (END STAGE RENAL DISEASE) ON DIALYSIS (H): ICD-10-CM

## 2018-01-01 DIAGNOSIS — F33.1 MAJOR DEPRESSIVE DISORDER, RECURRENT EPISODE, MODERATE (H): ICD-10-CM

## 2018-01-01 DIAGNOSIS — E10.8 TYPE 1 DIABETES MELLITUS WITH COMPLICATION (H): ICD-10-CM

## 2018-01-01 DIAGNOSIS — R13.19 ESOPHAGEAL DYSPHAGIA: ICD-10-CM

## 2018-01-01 DIAGNOSIS — L60.2 ONYCHAUXIS: ICD-10-CM

## 2018-01-01 DIAGNOSIS — N63.10 LUMP OF RIGHT BREAST: ICD-10-CM

## 2018-01-01 DIAGNOSIS — K21.9 GASTROESOPHAGEAL REFLUX DISEASE WITHOUT ESOPHAGITIS: ICD-10-CM

## 2018-01-01 DIAGNOSIS — N63.10 MASS OF RIGHT BREAST: ICD-10-CM

## 2018-01-01 DIAGNOSIS — N18.6 ESRD (END STAGE RENAL DISEASE) ON DIALYSIS (H): ICD-10-CM

## 2018-01-01 DIAGNOSIS — K52.9 CHRONIC DIARRHEA: ICD-10-CM

## 2018-01-01 DIAGNOSIS — N63.10 BREAST MASS, RIGHT: ICD-10-CM

## 2018-01-01 DIAGNOSIS — Z99.2 TYPE 1 DIABETES MELLITUS WITH CHRONIC KIDNEY DISEASE ON CHRONIC DIALYSIS (H): ICD-10-CM

## 2018-01-01 DIAGNOSIS — E10.65 TYPE 1 DIABETES MELLITUS WITH HYPERGLYCEMIA (H): ICD-10-CM

## 2018-01-01 DIAGNOSIS — E10.9 TYPE 1 DIABETES MELLITUS WITHOUT COMPLICATION, WITH LONG-TERM CURRENT USE OF INSULIN (H): ICD-10-CM

## 2018-01-01 DIAGNOSIS — N63.13 MASS OF LOWER OUTER QUADRANT OF RIGHT BREAST: ICD-10-CM

## 2018-01-01 DIAGNOSIS — B35.1 NAIL FUNGUS: ICD-10-CM
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
## 2018-01-01 DIAGNOSIS — E66.01 MORBID OBESITY WITH BMI OF 40.0-44.9, ADULT (H): ICD-10-CM

## 2018-01-01 DIAGNOSIS — R13.10 DYSPHAGIA: ICD-10-CM

## 2018-01-01 DIAGNOSIS — G47.33 OSA (OBSTRUCTIVE SLEEP APNEA): ICD-10-CM

## 2018-01-01 DIAGNOSIS — K31.84 GASTROPARESIS: ICD-10-CM

## 2018-01-01 DIAGNOSIS — E10.42 TYPE 1 DIABETES MELLITUS WITH POLYNEUROPATHY (H): ICD-10-CM

## 2018-01-01 DIAGNOSIS — T82.898S ARTERIOVENOUS FISTULA OCCLUSION, SEQUELA: ICD-10-CM

## 2018-01-01 DIAGNOSIS — G25.81 RLS (RESTLESS LEGS SYNDROME): ICD-10-CM

## 2018-01-01 DIAGNOSIS — H54.7 VISUAL IMPAIRMENT DUE TO DIABETES MELLITUS (H): ICD-10-CM

## 2018-01-01 DIAGNOSIS — H54.8 LEGALLY BLIND: ICD-10-CM

## 2018-01-01 DIAGNOSIS — E10.9 T1DM (TYPE 1 DIABETES MELLITUS) (H): ICD-10-CM

## 2018-01-01 DIAGNOSIS — N63.0 LUMP OR MASS IN BREAST: ICD-10-CM

## 2018-01-01 DIAGNOSIS — J02.9 SORE THROAT: ICD-10-CM
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
## 2018-01-01 DIAGNOSIS — R09.02 HYPOXIA: ICD-10-CM
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
## 2018-01-01 DIAGNOSIS — E10.22 TYPE 1 DIABETES MELLITUS WITH CHRONIC KIDNEY DISEASE ON CHRONIC DIALYSIS (H): ICD-10-CM

## 2018-01-01 DIAGNOSIS — F32.1 MODERATE MAJOR DEPRESSION (H): ICD-10-CM

## 2018-01-01 DIAGNOSIS — N61.0 MASTITIS, RIGHT, ACUTE: ICD-10-CM

## 2018-01-01 DIAGNOSIS — S29.019A THORACIC MYOFASCIAL STRAIN: ICD-10-CM

## 2018-01-01 DIAGNOSIS — J18.9 PNEUMONIA: ICD-10-CM

## 2018-01-01 DIAGNOSIS — I10 ESSENTIAL HYPERTENSION: ICD-10-CM

## 2018-01-01 DIAGNOSIS — N18.6 TYPE 1 DIABETES MELLITUS WITH CHRONIC KIDNEY DISEASE ON CHRONIC DIALYSIS (H): ICD-10-CM

## 2018-01-01 DIAGNOSIS — R07.0 THROAT PAIN: ICD-10-CM

## 2018-01-01 DIAGNOSIS — N64.4 BREAST PAIN: ICD-10-CM

## 2018-01-01 DIAGNOSIS — N76.0 VAGINITIS: ICD-10-CM

## 2018-01-01 DIAGNOSIS — K21.9 GERD (GASTROESOPHAGEAL REFLUX DISEASE): ICD-10-CM

## 2018-01-01 LAB
ANION GAP SERPL CALCULATED.3IONS-SCNC: 18 MMOL/L (ref 5–18)
BUN SERPL-MCNC: 53 MG/DL (ref 8–22)
CALCIUM SERPL-MCNC: 9.8 MG/DL (ref 8.5–10.5)
CHLORIDE BLD-SCNC: 92 MMOL/L (ref 98–107)
CLUE CELLS: ABNORMAL
CO2 SERPL-SCNC: 24 MMOL/L (ref 22–31)
CREAT SERPL-MCNC: 9.75 MG/DL (ref 0.6–1.1)
DEPRECATED S PYO AG THROAT QL EIA: NORMAL
DEPRECATED S PYO AG THROAT QL EIA: NORMAL
GFR SERPL CREATININE-BSD FRML MDRD: 5 ML/MIN/1.73M2
GLUCOSE BLD-MCNC: 360 MG/DL (ref 70–125)
GROUP A STREP BY PCR: NORMAL
GROUP A STREP BY PCR: NORMAL
HBA1C MFR BLD: 9.6 % (ref 3.5–6)
HBA1C MFR BLD: 9.9 % (ref 3.5–6)
POTASSIUM BLD-SCNC: 4.2 MMOL/L (ref 3.5–5)
POTASSIUM BLD-SCNC: 4.3 MMOL/L (ref 3.5–5)
SODIUM SERPL-SCNC: 134 MMOL/L (ref 136–145)
TRICHOMONAS, WET PREP: ABNORMAL
TSH SERPL DL<=0.005 MIU/L-ACNC: 3.07 UIU/ML (ref 0.3–5)
YEAST, WET PREP: ABNORMAL

## 2018-01-01 ASSESSMENT — MIFFLIN-ST. JEOR
SCORE: 1561.33
SCORE: 1599.88
SCORE: 1575.84
SCORE: 1562.23
SCORE: 1586.28
SCORE: 1559.97
SCORE: 1572.67
SCORE: 1569.49
SCORE: 1565.86

## 2018-09-12 ENCOUNTER — HOME CARE/HOSPICE - HEALTHEAST (OUTPATIENT)
Dept: HOME HEALTH SERVICES | Facility: HOME HEALTH | Age: 33
End: 2018-09-12

## 2018-09-14 ENCOUNTER — COMMUNICATION - HEALTHEAST (OUTPATIENT)
Dept: NURSING | Facility: CLINIC | Age: 33
End: 2018-09-14

## 2021-05-30 VITALS — BODY MASS INDEX: 42.5 KG/M2 | HEIGHT: 59 IN | WEIGHT: 210.8 LBS

## 2021-05-30 VITALS — BODY MASS INDEX: 42.74 KG/M2 | WEIGHT: 212 LBS | HEIGHT: 59 IN

## 2021-05-30 VITALS — WEIGHT: 213 LBS | BODY MASS INDEX: 43.02 KG/M2

## 2021-05-30 VITALS — WEIGHT: 207 LBS | BODY MASS INDEX: 41.81 KG/M2

## 2021-05-30 VITALS — HEIGHT: 59 IN | BODY MASS INDEX: 43.95 KG/M2 | WEIGHT: 218 LBS

## 2021-05-30 VITALS — BODY MASS INDEX: 42.33 KG/M2 | WEIGHT: 209.6 LBS

## 2021-05-31 VITALS — HEIGHT: 59 IN | BODY MASS INDEX: 43.67 KG/M2 | WEIGHT: 216.6 LBS

## 2021-05-31 VITALS — HEIGHT: 59 IN | WEIGHT: 212 LBS | BODY MASS INDEX: 42.74 KG/M2

## 2021-05-31 VITALS — WEIGHT: 212.6 LBS | BODY MASS INDEX: 42.86 KG/M2 | HEIGHT: 59 IN

## 2021-05-31 VITALS — BODY MASS INDEX: 44.96 KG/M2 | WEIGHT: 222.6 LBS

## 2021-05-31 VITALS — WEIGHT: 219 LBS | BODY MASS INDEX: 44.15 KG/M2 | HEIGHT: 59 IN

## 2021-05-31 VITALS — WEIGHT: 219.6 LBS | BODY MASS INDEX: 44.35 KG/M2

## 2021-05-31 VITALS — BODY MASS INDEX: 42.88 KG/M2 | WEIGHT: 212.3 LBS

## 2021-05-31 VITALS — BODY MASS INDEX: 43.08 KG/M2 | HEIGHT: 59 IN | WEIGHT: 213.7 LBS

## 2021-05-31 VITALS — BODY MASS INDEX: 43.83 KG/M2 | WEIGHT: 217 LBS

## 2021-05-31 VITALS — WEIGHT: 222.4 LBS | HEIGHT: 59 IN | BODY MASS INDEX: 44.84 KG/M2

## 2021-06-01 VITALS — WEIGHT: 214 LBS | BODY MASS INDEX: 43.14 KG/M2 | HEIGHT: 59 IN

## 2021-06-01 VITALS — BODY MASS INDEX: 44.85 KG/M2 | HEIGHT: 59 IN | WEIGHT: 222.5 LBS

## 2021-06-01 VITALS — BODY MASS INDEX: 43.34 KG/M2 | HEIGHT: 59 IN | WEIGHT: 215 LBS

## 2021-06-01 VITALS — BODY MASS INDEX: 44.39 KG/M2 | WEIGHT: 219.8 LBS

## 2021-06-01 VITALS — WEIGHT: 219.5 LBS | HEIGHT: 59 IN | BODY MASS INDEX: 44.25 KG/M2

## 2021-06-01 VITALS — WEIGHT: 214.2 LBS | HEIGHT: 59 IN | BODY MASS INDEX: 43.18 KG/M2

## 2021-06-01 VITALS — BODY MASS INDEX: 43.42 KG/M2 | WEIGHT: 215 LBS

## 2021-06-01 VITALS — BODY MASS INDEX: 44.23 KG/M2 | WEIGHT: 219 LBS

## 2021-06-01 VITALS — BODY MASS INDEX: 43.83 KG/M2 | WEIGHT: 217 LBS

## 2021-06-01 VITALS — BODY MASS INDEX: 42.5 KG/M2 | WEIGHT: 210.4 LBS

## 2021-06-01 VITALS — BODY MASS INDEX: 43.63 KG/M2 | WEIGHT: 216 LBS

## 2021-06-01 VITALS — WEIGHT: 219 LBS | BODY MASS INDEX: 44.23 KG/M2

## 2021-06-01 VITALS — WEIGHT: 217.2 LBS | BODY MASS INDEX: 43.79 KG/M2 | HEIGHT: 59 IN

## 2021-06-01 VITALS — BODY MASS INDEX: 42.98 KG/M2 | WEIGHT: 212.8 LBS

## 2021-06-01 VITALS — HEIGHT: 59 IN | BODY MASS INDEX: 43.64 KG/M2 | WEIGHT: 216.5 LBS

## 2021-06-01 VITALS — BODY MASS INDEX: 44.84 KG/M2 | WEIGHT: 222 LBS

## 2021-06-01 VITALS — HEIGHT: 59 IN | WEIGHT: 215.8 LBS | BODY MASS INDEX: 43.51 KG/M2

## 2021-06-02 VITALS — WEIGHT: 219 LBS | BODY MASS INDEX: 44.23 KG/M2

## 2021-06-05 ENCOUNTER — RECORDS - HEALTHEAST (OUTPATIENT)
Dept: SCHEDULING | Facility: CLINIC | Age: 36
End: 2021-06-05

## 2021-06-05 DIAGNOSIS — R11.2 NAUSEA WITH VOMITING: ICD-10-CM

## 2021-06-08 ENCOUNTER — RECORDS - HEALTHEAST (OUTPATIENT)
Dept: INTERNAL MEDICINE | Facility: CLINIC | Age: 36
End: 2021-06-08

## 2021-06-08 DIAGNOSIS — N63.13 UNSPECIFIED LUMP IN THE RIGHT BREAST, LOWER OUTER QUADRANT: ICD-10-CM

## 2021-06-08 DIAGNOSIS — N63.0 UNSPECIFIED LUMP IN UNSPECIFIED BREAST: ICD-10-CM

## 2021-06-08 NOTE — PROGRESS NOTES
[facility]  ENDOCRINOLOGY    Diabetes Note 2/5/2017    Sabra Leigh, 1985, [id number]          Reason for visit      1. DM type 1 (diabetes mellitus, type 1)        HPI     Sabra Leigh is a very pleasant 32 y.o. old female who presents for follow up.  SUMMARY:  Sully returns in f/u for her poorly controlled DM 1.  She reports that after her last appointment in my concern for her eyes, she went and had them looked at.  She has a scratched lens and cornea.  She is treating with drops, moisture and an ungt.  Her A1c is 9.1.  She has her new talking glucometer and states that this has made a huge difference for her.  Now if we could find a talking insulin pump, that would just make everything perfect.  She has lost 3 lbs and is really proud of herself.  She is happy today and seems to be a little less agitated than she usually is.  Her blood sugars are running between 114 and 600, which sounds about normal.  Blood glucose data:      Past Medical History     Patient Active Problem List   Diagnosis     Hypertension     Obstructive Sleep Apnea - does not use CPAP - she says she was told she needed it, but that it was not prescribed.     Anemia in CKD (chronic kidney disease)     GERD (gastroesophageal reflux disease)     Diabetic peripheral neuropathy associated with type 1 diabetes mellitus     DM type 1 (diabetes mellitus, type 1)     ESRD (end stage renal disease) on dialysis     RLS (restless legs syndrome) - ropinorole, 0.25 mg at hs at hospital discharge - November, 2015     Retinal detachment     Gastroparesis     SHANNON - shortness of breath with exertion that is limiting.     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     Cerebral atrophy     Sebaceous cyst of ear     Morbid obesity with BMI of 40.0-44.9, adult     Lipoma of abdominal wall     Chest pain     Stroke - states that her whole left side of her body was numb - this was at [facility] (remote)     Cataract     Chronic  diarrhea     CTS (carpal tunnel syndrome) - both sides        Family History       family history includes Aneurysm in her mother; Asthma in her brother, daughter, sister, and sister; Depression in her mother; Diabetes in her mother and sister; Hypertension in her mother; Sleep apnea in her brother, mother, sister, and sister; Snoring in her brother, mother, sister, sister, and sister; Stroke in her mother.    Social History      reports that she quit smoking about 19 years ago. Her smoking use included Cigarettes. She has a 0.25 pack-year smoking history. She has never used smokeless tobacco. She reports that she does not drink alcohol or use illicit drugs.      Review of Systems     Patient has no polyuria or polydipsia,no chest pain, dyspnea or TIA's,no numbness, tingling or pain in extremities  Remainder negative except as noted in HPI.    Vital Signs     Visit Vitals     /74     Pulse 76     Wt 207 lb (93.9 kg)     BMI 41.81 kg/m2     Wt Readings from Last 3 Encounters:   02/03/17 207 lb (93.9 kg)   01/03/17 210 lb 12.8 oz (95.6 kg)   12/23/16 208 lb 6.4 oz (94.5 kg)       Physical Exam     Constitutional:  Well developed, Well nourished  HENT:  Normocephalic,   Neck: Thyroid normal, No lymph nodes, Supple  Eyes:  PERRL, Conjunctiva pink  Respiratory:  Normal breath sounds, No respiratory distress  Cardiovascular:  Normal heart rate, Normal rhythm, No murmurs  GI:  Bowel sounds normal, Soft, No tenderness  Musculoskeletal:  No gross deformity or lesions, normal dorsalis pedis pulses  Skin: No acanthosis nigricans, lipoatrophy or lipodystrophy  Neurologic:  Alert & oriented x 3, nonfocal  Psychiatric:  Affect, Mood, Insight appropriate  Diabetic foot exam: no ulcers, charcot's or high risk calluses,much less dry      Assessment     1. DM type 1 (diabetes mellitus, type 1)        Plan     Sully has a little momentum going and she knows it - we will get her with the DE to find out about a possible new pump.   I have encouraged her to keep up the good work.  Time spent with her today: 25 min with > 50% in counseling and coordination of care and listening.    Misti Chapa  [facility]in[facility]  2/5/2017  12:53 PM    Lab Results     HEMOGLOBIN A1C   Date Value Ref Range Status   02/03/2017 9.1 (H) 3.5 - 6.0 % Final   10/15/2016 8.7 (H) 4.2 - 6.1 % Final   08/08/2016 9.9 (H) 3.5 - 6.0 % Final   04/27/2016 8.8 (H) 3.5 - 6.0 % Final   01/13/2016 10.0 (H) 3.5 - 6.0 % Final     CREATININE   Date Value Ref Range Status   10/17/2016 10.08 (HH) 0.60 - 1.10 mg/dL Final   10/16/2016 8.03 (HH) 0.60 - 1.10 mg/dL Final   10/15/2016 10.83 (HH) 0.60 - 1.10 mg/dL Final     MICROALBUMIN, RANDOM URINE   Date Value Ref Range Status   07/09/2015 499.19 (H) 0.00 - 1.99 mg/dL Final       CHOLESTEROL   Date Value Ref Range Status   04/27/2016 190 <=199 mg/dL Final     HDL CHOLESTEROL   Date Value Ref Range Status   04/27/2016 30 (L) >=50 mg/dL Final     LDL CALCULATED   Date Value Ref Range Status   04/27/2016  <=129 mg/dL Final     Comment:     Invalid, Triglycerides >400     TRIGLYCERIDES   Date Value Ref Range Status   04/27/2016 444 (H) <=149 mg/dL Final       Lab Results   Component Value Date    ALT 31 10/14/2016    AST 28 10/14/2016    ALKPHOS 281 (H) 10/14/2016    BILITOT 0.4 10/14/2016         Current Medications     Outpatient Medications Prior to Visit   Medication Sig Dispense Refill     acetaminophen (TYLENOL) 325 MG tablet Take 650 mg by mouth every 6 (six) hours as needed for pain.       acetone, urine, test (ACETONE, URINE, TEST) Strp Check urine for ketones if blood sugar is above 250.  Call Dr Soliz if ketones are moderate or large. 50 strip 3     alcohol swabs (ALCOHOL PADS) PadM Apply 1 Units topically daily. 70% 100 each 3     amLODIPine (NORVASC) 10 MG tablet Take 10 mg by mouth daily.       aspirin 81 MG EC tablet Take 81 mg by mouth daily.       blood glucose meter (GLUCOMETER) Use 1 each As Directed as needed. Dispense  "prodigy talking glucose meter 1 each 0     blood glucose test strips One each 4 times per day. Dispense prodigy strips for talking meter 100 each 11     calcium acetate (PHOSLO) 667 mg capsule Take 1 capsule (667 mg total) by mouth 3 (three) times a day. 90 capsule 11     cane Jie Use as directed 1 each 0     cholecalciferol, vitamin D3, 1,000 unit tablet Take 1 tablet (1,000 Units total) by mouth daily. 90 tablet 2     CLASSIC PRENATAL 28 mg iron- 800 mcg Tab Take 1 tablet by mouth daily. 90 tablet 3     CONTOUR NEXT STRIPS strips CHECK BLOOD SUGARS 4X/DAY DUE TO SEVERE HYPOGLYCEMIA. 300 strip 2     erythromycin ophthalmic ointment Administer 1 application to the right eye 4 (four) times a day.       gabapentin (NEURONTIN) 300 MG capsule Take 1 capsule (300 mg total) by mouth daily. 90 capsule 2     glucagon, human recombinant, (GLUCAGON) 1 mg injection Use as directed for hypoglycemia (Patient taking differently: Inject 1 each under the skin. Use as directed for hypoglycemia) 1 each 3     glucose (DEX4 GLUCOSE) 4 GM chewable tablet Chew 16 g as needed for low blood sugar.       insulin needles, disposable, (BD INSULIN PEN NEEDLE UF SHORT) 31 X 5/16 \" Ndle Use As Directed 3 (three) times a day.       insulin pump cartridge Crtg 1 each Inject under the skin continuous. novolog insulin       insulin pump cartridge Inject under the skin continuous. Insulin pump discharge instructions from 10/15/16.  This is intended as additional information to the patient's PTA insulin pump order.  Continue with insulin pump at present settings. 1 each 0     insulin syringe-needle U-100 (BD INSULIN SYRINGE ULTRA-FINE) 0.3 mL 31 x 5/16\" Syrg Test 3 times daily 100 each 3     LANCING DEVICE MISC Use As Directed.       LANTUS SOLOSTAR 100 unit/mL (3 mL) pen Inject 8 units BID if pump fails (Patient taking differently: Inject 12 Units under the skin as needed (Only used when pump was not working). ) 15 mL PRN     " "losartan-hydrochlorothiazide (HYZAAR) 50-12.5 mg per tablet Take 1 tablet by mouth daily.       metoclopramide (REGLAN) 5 MG tablet Take 1 TID, AC.  **Discuss this with Dr. Moore on your next visit, Sabra.\"  Pharmacist:  Please read note. 90 tablet 1     metoprolol tartrate (LOPRESSOR) 50 MG tablet Take 1 tablet (50 mg total) by mouth 2 (two) times a day. 180 tablet 3     NOVOLOG 100 unit/mL injection USE DAILY IN INSULIN PUMP UP TO 50 UNITS PER DAY 10 mL 0     omeprazole (PRILOSEC) 20 MG capsule TAKE ONE CAPSULE BY MOUTH ONE TIME DAILY 30 MINUTES BEFORE A MEAL TO PREVENT HEARTBURN/CHEST PAIN 30 capsule 1     prenatal vit-iron fumarate-FA 28 mg iron- 800 mcg Tab Take 1 tablet by mouth daily. 90 tablet 0     rOPINIRole (REQUIP) 0.25 MG tablet Take 0.25 mg by mouth bedtime.       transparent dressings 2 3/8 X 2 3/4 \" Bndg Apply 1 patch topically every third day. 6 each 11     No facility-administered medications prior to visit.            "

## 2021-06-08 NOTE — PROGRESS NOTES
1/26/2017    Start time: 15:00  Stop Time: 15:52   Session # 27. Session 1 this year.     Sabra Leigh is a 31 y.o. female is being seen today for    Chief Complaint   Patient presents with      Follow Up     Follow up in regards to ongoing symptom management of anxiety and depression.     New symptoms or complaints: None    Functional Impairment:   Personal: 3  Family: 3  Social: 3  Work: 4    Clinical assessment of mental status:   Sabra Leigh presented on time.   She was oriented in time. Can't see and needs support but remembers places she has been at and time. She was cooperative and dressed appropriately for this session and weather. Her memory was Normal cognitive functioning .  Her speech was  Within normal.  Language was appropriate.  Concentration and focus is Within normal. Psychosis is not noted or reported. She reports her mood is Congruent w/content of speech.  Affect is congruent with speech and is Congruent w/content of speech.  Fund of knowledge is adequate. Insight is adequate for therapy.    Suicidal/Homicidal Ideation present: Patient denies suicidal and homicidal ideations/means or plans.     Patient's impression of their current status: Patient presented to the clinic continue expressing her feelings and concerns for support and coping strategies. Patient reports no new concerns today. Reports she has been working hard to lose weight. She will have a new PCA starting with mid March. 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 Ex partner is still in touch and they both help each in many ways including raising the patient's daughter. Looks forward to spending some quality time with the family on her birthday next week. She plans to go to [employer] with her friends which she said she has been visiting for bingo and gambling. She does not see this  "having some potential safety risk. She stated she trust her own gut and does not spend her money. Plans to continue with therapy weekly.    Therapist impression of patients current state: This 31 y.o. Black or  female presented on time for her therapy session. Patient was in a non apparent stress today with the exception of the redness in her left eye. It appears that her vision has gotten worse. Writer actively listened to the patient, validated her feelings, and reinforced her positive attitude about her own health and how she can improve her health. Patient centered and CBT skills seem to work well with the patient as evidenced by her improvement in both depression and acceptance of her illness. She was more coordinated and alert dispite the physical pain.     Assessment tools used today include: How do you feel today?    Type of psychotherapeutic technique provided: Client centered and CBT, relaxation techniques.    Progress toward short term goals:\" I feel okay today\"    Review of long term goals: Not done at today's visit and Treatment Plan updated: 11/04/2016 next review: 2/03/2017    Diagnosis:   1. Adjustment disorder with mixed anxiety and depressed mood    2. Major depressive disorder, recurrent episode, moderate     No change  Plan and Follow-up:     1. Patient plans to continue taking her medications as prescribed.  2. Patient plans to continue to follow through her appointments as scheduled.  3.Patient plans to practice relaxation techniques she learned today as needed  4. Patient will see this therapist in a week.    Discharge Criteria/Planning: Client has chronic symptoms and ongoing therapy for maintenance stability recommended.    Performed and documented by FIOR Dozier; [facility] 1/26/2017    This note has been dictated using voice recognition software. Any grammatical or context distortions are unintentional and inherent to the software.    "

## 2021-06-08 NOTE — PROGRESS NOTES
ASSESSMENT: Onychauxis, type 1 diabetes with neurologic manifestations, foot pain.    PLAN: Toenails were debrided manually and mechanically x10. Return to clinic in nine weeks.         SUBJECTIVE: Toenails are long, thick and painful. Last nail debridement in the clinic was October 31, 2016. She continues with dialysis. Vision is significantly impaired.    OBJECTIVE:  General: Pleasant 31 y.o. female in no acute distress.  Vascular: DP pulses are diminished. PT pulses are diminished. Pedal hair is absent. Feet are cool to the touch.  Neuro: Sensation in the feet is absent.  Derm: Toenails are thickened and dystrophic with discoloration and subungual debris. Plantar skin is xerotic, but intact.  Musculoskeletal: Adequate passive range of motion and foot and ankle joints.

## 2021-06-08 NOTE — PROGRESS NOTES
2/17/2017    Start time: 15:45  Stop Time: 16:20   Session # 29. Session 3 this year.     Sabra Leigh is a 32 y.o. female is being seen today for    Chief Complaint   Patient presents with      Follow Up     Follow up in regards to ongoing symptom management of anxiety and depression.     New symptoms or complaints: None    Functional Impairment:   Personal: 3  Family: 3  Social: 3  Work: 4    Clinical assessment of mental status:   Sabra Leigh presented on time.   She was oriented in time. Can't see and needs support but remembers places she has been at and time. She was cooperative and dressed appropriately for this session and weather. Her memory was Normal cognitive functioning .  Her speech was  Within normal.  Language was appropriate.  Concentration and focus is Within normal. Psychosis is not noted or reported. She reports her mood is Congruent w/content of speech.  Affect is congruent with speech and is Congruent w/content of speech.  Fund of knowledge is adequate. Insight is adequate for therapy.    Suicidal/Homicidal Ideation present: Patient denies suicidal and homicidal ideations/means or plans.     Patient's impression of their current status: Patient presented to the clinic continue expressing her feelings and concerns for support and coping strategies. Patient came in today late due to traffic coming from her eye doctor's appointment at [facility]. She is doing better per her report. She received the care she needed. She reports no change in her mood; continues to be hopeful and optimistic. Will continue her therapy for more support.     Therapist impression of patients current state: This 32 y.o. Black or  female presented on time for her therapy session. Patient was accompanied by her ex partner to her therapy but entered alone in session. Writer assessed her safety. Reinforced her commitment to therapy and encouraged her to continue practicing coping strategies learned in  "previous sessions.active listening, validation, and empathy work well with the patient.      Assessment tools used today include: How do you feel today?    Type of psychotherapeutic technique provided: Client centered and CBT, relaxation techniques.    Progress toward short term goals:\" I feel okay today\"    Review of long term goals: Not done at today's visit:11/04/2016 Next review: 2/10/2017 no update today.  Patient came in late. Paper work was left home as she was coming from another appointment.     Diagnosis:     1. Adjustment disorder with mixed anxiety and depressed mood    2. Major depressive disorder, recurrent episode, moderate     No change  Plan and Follow-up:     .1. Patient plans to continue taking her medications as prescribed.  2. Patient plans to continue to follow through her appointments as scheduled.  3.Patient plans to practice relaxation techniques she learned today as needed  4. Patient will see this therapist in a week.    Discharge Criteria/Planning: Client has chronic symptoms and ongoing therapy for maintenance stability recommended.    Performed and documented by BECKY Dozier- ANA; [facility] 2/17/2017    This note has been dictated using voice recognition software. Any grammatical or context distortions are unintentional and inherent to the software.    "

## 2021-06-08 NOTE — PROGRESS NOTES
2/10/2017    Start time: 12:00  Stop Time: 12:52   Session # 28. Session 2 this year.     Sabra Leigh is a 32 y.o. female is being seen today for    Chief Complaint   Patient presents with      Follow Up     Follow up in regards to ongoing symptom management of anxiety and depression.     New symptoms or complaints: None    Functional Impairment:   Personal: 3  Family: 3  Social: 3  Work: 4    Clinical assessment of mental status:   Sabra Leigh presented on time.   She was oriented in time. Can't see and needs support but remembers places she has been at and time. She was cooperative and dressed appropriately for this session and weather. Her memory was Normal cognitive functioning .  Her speech was  Within normal.  Language was appropriate.  Concentration and focus is Within normal. Psychosis is not noted or reported. She reports her mood is Congruent w/content of speech.  Affect is congruent with speech and is Congruent w/content of speech.  Fund of knowledge is adequate. Insight is adequate for therapy.    Suicidal/Homicidal Ideation present: Patient denies suicidal and homicidal ideations/means or plans.     Patient's impression of their current status: Patient presented to the clinic continue expressing her feelings and concerns for support and coping strategies. Patient has eye infection making it much more difficult to see with the left eye that at least was helping her to see some brightness; she has been losing sight her blindness has progressed over time. She is planning to see her eye doctor at [facility].  Reports she has been working hard to lose weight and it is paying off losing 3 lbs in the last week. She will have a new PCA starting with mid March. 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 This partner accompanied her today to her appointment but decided to stay away from the session. Patient stated she felt they both were stressed out  "by doing too much for each other. Had a good time on her birth day which she spent with family and closest friends. She also went to [employer] with her friends. She does not see this having some potential safety risk. She stated she trusts her own gut and does not spend her money. Plans to continue with therapy weekly.    Therapist impression of patients current state: This 32 y.o. Black or  female presented on time for her therapy session. Patient was in a non apparent stress today with the exception of the concerns around blindness in her left eye; the only eye left working at least partially.  Writer actively listened to the patient, validated her feelings, and reinforced her positive attitude about her own health as well as how she values her life and her role in other people's life. Patient centered and CBT skills seem to work well with the patient as evidenced by her improvement in both depression and acceptance of her illness. She was more coordinated and alert dispite the physical challenges including blindness.    Assessment tools used today include: How do you feel today?    Type of psychotherapeutic technique provided: Client centered and CBT, relaxation techniques.    Progress toward short term goals:\" I feel okay today\"    Review of long term goals: Not done at today's visit and Treatment Plan updated:11/04/2016 Next review: 2/10/2017   A review will be completed at the next session.     Diagnosis:     1. Adjustment disorder with mixed anxiety and depressed mood    2. Major depressive disorder, recurrent episode, moderate     No change  Plan and Follow-up:     .1. Patient plans to continue taking her medications as prescribed.  2. Patient plans to continue to follow through her appointments as scheduled.  3.Patient plans to practice relaxation techniques she learned today as needed  4. Patient will see this therapist in a week.    Discharge Criteria/Planning: Client has chronic symptoms and " ongoing therapy for maintenance stability recommended.    Performed and documented by BECKY Dozier- ANA; [facility] 2/10/2017    This note has been dictated using voice recognition software. Any grammatical or context distortions are unintentional and inherent to the software.

## 2021-06-08 NOTE — PROGRESS NOTES
[facility]  ENDOCRINOLOGY    Diabetes Note 1/5/2017    Sabra Leigh, 1985, [id number]          Reason for visit      1. 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 Type 1 diabetes mellitus with nephropathy    3. Diabetic peripheral neuropathy associated with type 1 diabetes mellitus        HPI     Sabra Leigh is a very pleasant 31 y.o. old female who presents for follow up.  SUMMARY:  Sully returns today in f/u for poorly controled DM 1.  She tells me today that her L eye is worse and that she cannot see much of anything out of that eye.  She has an appointment with her eye specialist on 1/11/17.  She has not been testing because she is running out of strips.  She currently doesn't have any insurance, so she has no way to pay for them.  She has missed getting them some sort of paperwork that she is sure did not come in the mail.  She continues to work on weight loss because she is under the understanding that she has to lose weight before she will be eligible for a pancreas/kidney transplant.  I have never heard of such stipulations, but I don't know what the guidelines are.  She has gotten her exercise bike fixed, and has been using it.  She continues to attend dialysis three times a week and rarely eats before she goes.  That means she is fasting until about 2 PM.  She tells me today that her lunch was left on the counter because she didn't want to eat in the car out of respect for the person driving her.  She rarely gets her glucose levels into her pump because she cannot see them on her meter.  She is interested in a talking glucometer and we will get that set up for her to get. She continues to have wide fluctuations in her blood sugars, with episodes of hypoglycemia. Her feet are looking better physically, as she has been working hard with the lotion.  They are not bothering her physically at present.  Blood glucose data:      Past Medical History     Patient Active Problem List   Diagnosis  "    Hypertension     Obstructive Sleep Apnea - does not use CPAP - she says she was told she needed it, but that it was not prescribed.     Anemia in CKD (chronic kidney disease)     GERD (gastroesophageal reflux disease)     Diabetic peripheral neuropathy associated with type 1 diabetes mellitus     DM type 1 (diabetes mellitus, type 1)     ESRD (end stage renal disease) on dialysis     RLS (restless legs syndrome) - ropinorole, 0.25 mg at hs at hospital discharge - November, 2015     Retinal detachment     Gastroparesis     SHANNON - shortness of breath with exertion that is limiting.     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     Cerebral atrophy     Sebaceous cyst of ear     Morbid obesity with BMI of 40.0-44.9, adult     Lipoma of abdominal wall     Chest pain     Stroke - states that her whole left side of her body was numb - this was at [facility] (remote)     Cataract     Chronic diarrhea     CTS (carpal tunnel syndrome) - both sides        Family History       family history includes Aneurysm in her mother; Asthma in her brother, daughter, sister, and sister; Depression in her mother; Diabetes in her mother and sister; Hypertension in her mother; Sleep apnea in her brother, mother, sister, and sister; Snoring in her brother, mother, sister, sister, and sister; Stroke in her mother.    Social History      reports that she quit smoking about 19 years ago. Her smoking use included Cigarettes. She has a 0.25 pack-year smoking history. She has never used smokeless tobacco. She reports that she does not drink alcohol or use illicit drugs.      Review of Systems     Patient has no polyuria or polydipsia, no chest pain, dyspnea or TIA's, no numbness, tingling or pain in extremities  Remainder negative except as noted in HPI.    Vital Signs     Visit Vitals     BP (!) 84/60 (Patient Site: Left Arm, Patient Position: Sitting, Cuff Size: Adult Large)     Pulse 88     Ht 4' 11\" (1.499 m) "     Wt 210 lb 12.8 oz (95.6 kg)     BMI 42.58 kg/m2     Wt Readings from Last 3 Encounters:   01/03/17 210 lb 12.8 oz (95.6 kg)   12/23/16 208 lb 6.4 oz (94.5 kg)   12/23/16 206 lb 11.2 oz (93.8 kg)       Physical Exam     Constitutional:  Well developed, Well nourished  HENT:  Normocephalic,   Neck: Thyroid normal, No lymph nodes, Supple  Eyes:  PERRL, Conjunctiva pink  Respiratory:  Normal breath sounds, No respiratory distress  Cardiovascular:  Normal heart rate, Normal rhythm, No murmurs  Skin: No acanthosis nigricans, lipoatrophy or lipodystrophy  Neurologic:  Alert & oriented x 3, nonfocal  Psychiatric:  Affect, Mood, Insight appropriate      Assessment     1. 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 Type 1 diabetes mellitus with nephropathy    3. Diabetic peripheral neuropathy associated with type 1 diabetes mellitus        Plan     I had Loyda Doe come in and get her hooked up with a Prodigy meter, which will tell her what her blood sugars are verbally.  She continues to use the insulin pump and counts how many times she pushes the buttons and then listens to the pump administer the insulin to know whether it has happened or not.  We will consider getting her together with the [employer] Rep when the new closed loop system, as this would be a good set up for her. Time spent with pt 25 min with > 50% spent in counseling and coordination of care.    Misti WILCOX Endocrinology  1/5/2017  2:12 PM      Lab Results     HEMOGLOBIN A1C   Date Value Ref Range Status   10/15/2016 8.7 (H) 4.2 - 6.1 % Final   08/08/2016 9.9 (H) 3.5 - 6.0 % Final   04/27/2016 8.8 (H) 3.5 - 6.0 % Final   01/13/2016 10.0 (H) 3.5 - 6.0 % Final   10/14/2015 8.8 (H) 3.5 - 6.0 % Final     CREATININE   Date Value Ref Range Status   10/17/2016 10.08 (HH) 0.60 - 1.10 mg/dL Final   10/16/2016 8.03 (HH) 0.60 - 1.10 mg/dL Final   10/15/2016 10.83 () 0.60 - 1.10 mg/dL Final     MICROALBUMIN, RANDOM URINE   Date Value Ref Range  Status   07/09/2015 499.19 (H) 0.00 - 1.99 mg/dL Final       CHOLESTEROL   Date Value Ref Range Status   04/27/2016 190 <=199 mg/dL Final     HDL CHOLESTEROL   Date Value Ref Range Status   04/27/2016 30 (L) >=50 mg/dL Final     LDL CALCULATED   Date Value Ref Range Status   04/27/2016  <=129 mg/dL Final     Comment:     Invalid, Triglycerides >400     TRIGLYCERIDES   Date Value Ref Range Status   04/27/2016 444 (H) <=149 mg/dL Final       Lab Results   Component Value Date    ALT 31 10/14/2016    AST 28 10/14/2016    ALKPHOS 281 (H) 10/14/2016    BILITOT 0.4 10/14/2016         Current Medications     Outpatient Medications Prior to Visit   Medication Sig Dispense Refill     acetaminophen (TYLENOL) 325 MG tablet Take 650 mg by mouth every 6 (six) hours as needed for pain.       acetone, urine, test (ACETONE, URINE, TEST) Strp Check urine for ketones if blood sugar is above 250.  Call Dr Soliz if ketones are moderate or large. 50 strip 3     alcohol swabs (ALCOHOL PADS) PadM Apply 1 Units topically daily. 70% 100 each 3     amLODIPine (NORVASC) 10 MG tablet Take 10 mg by mouth daily.       aspirin 81 MG EC tablet Take 81 mg by mouth daily.       calcium acetate (PHOSLO) 667 mg capsule Take 667 mg by mouth 3 (three) times a day.       cane Jie Use as directed 1 each 0     cholecalciferol, vitamin D3, 1,000 unit tablet Take 1 tablet (1,000 Units total) by mouth daily. 90 tablet 2     CLASSIC PRENATAL 28 mg iron- 800 mcg Tab Take 1 tablet by mouth daily. 90 tablet 3     CONTOUR NEXT STRIPS strips CHECK BLOOD SUGARS 4X/DAY DUE TO SEVERE HYPOGLYCEMIA. 300 strip 2     erythromycin ophthalmic ointment Administer 1 application to the right eye 4 (four) times a day.       gabapentin (NEURONTIN) 300 MG capsule Take 1 capsule (300 mg total) by mouth daily. 90 capsule 2     glucagon, human recombinant, (GLUCAGON) 1 mg injection Use as directed for hypoglycemia (Patient taking differently: Inject 1 each under the skin. Use as  "directed for hypoglycemia) 1 each 3     glucose (DEX4 GLUCOSE) 4 GM chewable tablet Chew 16 g as needed for low blood sugar.       insulin needles, disposable, (BD INSULIN PEN NEEDLE UF SHORT) 31 X 5/16 \" Ndle Use As Directed 3 (three) times a day.       insulin pump cartridge Crtg 1 each Inject under the skin continuous. novolog insulin       insulin pump cartridge Inject under the skin continuous. Insulin pump discharge instructions from 10/15/16.  This is intended as additional information to the patient's PTA insulin pump order.  Continue with insulin pump at present settings. 1 each 0     insulin syringe-needle U-100 (BD INSULIN SYRINGE ULTRA-FINE) 0.3 mL 31 x 5/16\" Syrg Test 3 times daily 100 each 3     LANCING DEVICE MISC Use As Directed.       LANTUS SOLOSTAR 100 unit/mL (3 mL) pen Inject 8 units BID if pump fails (Patient taking differently: Inject 12 Units under the skin as needed (Only used when pump was not working). ) 15 mL PRN     losartan-hydrochlorothiazide (HYZAAR) 50-12.5 mg per tablet Take 1 tablet by mouth daily.       metoclopramide (REGLAN) 5 MG tablet Take 1 TID, AC.  **Discuss this with Dr. Moore on your next visit, Sabra.\"  Pharmacist:  Please read note. 90 tablet 1     metoprolol tartrate (LOPRESSOR) 50 MG tablet Take 1 tablet (50 mg total) by mouth 2 (two) times a day. 180 tablet 3     NOVOLOG 100 unit/mL injection USE DAILY IN INSULIN PUMP UP TO 50 UNITS PER DAY 10 mL 0     omeprazole (PRILOSEC) 20 MG capsule TAKE ONE CAPSULE BY MOUTH ONE TIME DAILY 30 MINUTES BEFORE A MEAL TO PREVENT HEARTBURN/CHEST PAIN 30 capsule 1     prenatal vit-iron fumarate-FA 28 mg iron- 800 mcg Tab Take 1 tablet by mouth daily. 90 tablet 0     rOPINIRole (REQUIP) 0.25 MG tablet Take 0.25 mg by mouth bedtime.       transparent dressings 2 3/8 X 2 3/4 \" Bndg Apply 1 patch topically every third day. 6 each 11     No facility-administered medications prior to visit.            "

## 2021-06-08 NOTE — PROGRESS NOTES
The patient presents at the request of Dr. Moore for a bilateral upper extremity EMG.  She has over a one-year history of left greater than right hand numbness and tingling through the palm with stiffness and symptoms through digits 1-4.  Fifth digit not involved.  She is right-handed.  She is a history of diabetes mellitus on dialysis.    EMG/NCS  results: Please see scanned full report  Verbal consent was obtained.    Comment NCS: Abnormal study:  1.  Prolonged latency bilateral median SNAPs and transcarpal studies with borderline or decreased amplitudes  2.  Absent bilateral ulnar SNAPs and transcarpal studies  3.  Mildly prolonged left median CMAP latency with slowed conduction velocity bilaterally  4.  Mildly prolonged right ulnar CMAP latency with slowed conduction velocity bilaterally  5.  Low amplitude left radial SNAP    Comment EMG: Normal study  1.  Normal needle EMG bilateral upper extremities    Interpretation: Abnormal study    1.  Electrodiagnostic findings are most consistent with a sensorimotor polyneuropathy, predominantly axonal, relatively severe.      2.  Given the severity of the polyneuropathy findings, I cannot completely exclude a relatively mild median or ulnar neuropathy in the bilateral upper extremities.  With the relatively symmetrical findings, this may very well represent polyneuropathy alone.      3. There is no electrodiagnostic evidence of cervical radiculopathy in the bilateral upper extremities.    The testing was completed in its entirety by the physician.       It was our pleasure caring for your patient today, if there any questions or concerns please do not hesitate to contact us.

## 2021-06-08 NOTE — PROGRESS NOTES
"Assessment: Pt seen today to discuss getting a new pump. She is interested in the Medtronic 670 closed loop and sensor. Right now, she has the 530 and she boluses by feeling and by listening. Pt states her current pump was set up to have a beep with each unit.   I emailed Mary Jo Gladis with [employer] to see if: pt is eligible for an upgrade and if we are able to set the new pumps up with this beeping noise for each unit.   She has a PCA and nurse come out every 3 days to change her infusion set. She states if she did get the sensor she thinks she would be able to do it herself. She recently got a talking meter and she is happy with this.   Pt has a 15 y/o daughter. She notes her daughter is wanting to have a baby.    Pt is also on dialysis 3 days/week. She is wanting/trying to get on a kidney and pancreas transplant list but per pt she \"needs to lose weight first.\" She states she is going to start working on this.   She has been diabetic for about 19 years.     Plan: will check with [employer], will f/u with pt at 3/7 visit     Subjective and Objective:      Sabra Leigh is referred by [facility] for Diabetes Education.     Lab Results   Component Value Date    HGBA1C 9.1 (H) 02/03/2017       Follow up:   3/7/17 with Salome       Education:     Monitoring   Meter (per above goals): Discussed  Monitoring: Assessed and Discussed  BG goals: Discussed    Nutrition Management  Nutrition Management: Assessed and Discussed  Weight: Discussed  Portions/Balance: Assessed and Discussed  Carb ID/Count: Assessed and Discussed  Label Reading: Not addressed  Heart Healthy Fats: Not addressed  Menu Planning: Assessed and Discussed  Dining Out: Assessed and Discussed  Physical Activity: Assessed and Discussed  Site Rotation: Discussed   Sites Assessed: yes    Diabetes Disease Process: Discussed    Acute Complications: Prevent, Detect, Treat:  Hypoglycemia: Assessed and Discussed  Hyperglycemia: Assessed and Discussed  Sick Days: Not " addressed  Driving: does not drive    Chronic Complications  Foot Care:Not addressed  Skin Care: Not addressed  Eye: Discussed  ABC: Discussed  Teeth:Not addressed  Goal Setting and Problem Solving: Assessed and Discussed  Barriers: Assessed and Discussed  Psychosocial Adjustments: Assessed and Discussed      Time spent with the patient: 60 minutes for diabetes education and counseling.   Previous Education: yes  Visit Type:DSMT  Hours Remaining: DSMT 1 and MNT 2      Loyda Doe  2/13/2017    I agree with the aforementioned diabetes plan.  Misti MENSAH [facility]  2/13/2017  2:49 PM

## 2021-06-09 NOTE — PROGRESS NOTES
Outpatient Mental Health Treatment Plan    Name:  Sabra Leigh  :  1985  MRN:  [id number]    Treatment Plan:  Updated Treatment Plan  Intake/initial treatment plan date:  2015  Benefit and risks and alternatives have been discussed: Yes  Is this treatment appropriate with minimal intrusion/restrictions: Yes  Estimated duration of treatment:  10 +  Anticipated frequency of services:  Every other week  Necessity for frequency: This frequency is needed to establish therapeutic goals and for continuity of care in order to monitor progress.  Necessity for treatment: To address cognitive, behavioral, and/or emotional barriers in order to work toward goals and to improve quality of life.    Plan:   Goal: Decrease average depression level from 3 to 2. -Same  Strategies:  ?[X] Decrease social isolation    [X] Increase involvement in meaningful activities    ?[X] Discuss sleep hygiene    ?[X] Explore thoughts and expectations about self and others    ?[X] Process grief (loss of significant person, independence, role, etc.)    ?[X] Assess for suicide risk    ?[ X] Implement physical activity routine (with physician approval)    [  ] Consider introduction of bibliotherapy and/or videos    [X] Continue compliance with medical treatment plan (or explore barriers)     Degree to which this is a problem (1-4): 3  Degree to which goal is met (1-4): 3    Date of Review: 2017  ? Anxiety   Goal: Decrease average anxiety level from 3 to 2.  Same   Strategies:  ? [X]Learn and practice relaxation techniques and other coping strategies (e.g., thought stopping, reframing, meditation)    ? [X] Increase involvement in meaningful activities    ? [X] Discuss sleep hygiene    ? [X] Explore thoughts and expectations about self and others    ? [X ] Identify and monitor triggers for panic/anxiety symptoms    ? [X ] Implement physical activity routine (with physician approval)    ? [  ] Consider introduction of bibliotherapy and/or  "videos    ? [X] Continue compliance with medical treatment plan (or explore barriers)      Degree to which this is a problem (1-4): 3  Degree to which goal is met (1-4): 3  Date of Review: 5/26/2017      Functional Impairment: 1=Not at all/Rarely 2=Some days 3=Most Days 4=Every Day    Personal: 3  Family: 4  Social: 3    Work: 4- Unable to see this time    Diagnosis: Adjustment disorder with mixed anxiety and depressed mood      WHODAS 2.0 12-item version: 28  H1:30    H2: 30  H3: 30  In the last 30 days, patient's level of disability was at 58 % or severe. No change   Clinical assessments completed: PHQ-9=10; MAISHA-7=9;  WHODAS 2.0( 58 %); CAGE AID: 0/4.   Strengths: \"Doing hair, cooking with some assistance, very optimistic, and positive\"  Limitations: \"Unable to see much, to work as used to; mental health and medical issues.\"    Cultural Considerations: Mrs. Leigh is a 32 years old  female living with her 15 year old daughter. Partner just  from her long term partner of the last 10 years. However, this ex partner is still involved in patient and her daughter life. She is still her big support in many ways.   is still experiencing visual, kidney, and diabetes problems; which affects her mental health. Mrs. Leigh is determined to work with every provider she can get to feel better. Her depression is getting better and her level of acceptance is better. Mrs Leigh wants to use a holistic approach in her recovery process by strengthing her sary in combination of the [facility]. Mrs. Leigh continues to have limited family and social support and relies mostly on the outside providers. Mrs. Leigh works with any provider regardless their race and ethnicity.      Persons responsible for this plan:  ? [x] Patient ? [x] Provider ? [x] Other: And all the care of the patient at [facility]    Provider:Performed and documented by FIOR Dozier; [facility] 2/24/2017    Date:  " 2/24/2017  Time:  12:31 PM        Patient Signature:____________________________________ Date: ______________     Guardian Signature: __________________________________ Date: ______________     Therapist Signature: __________________________________ Date: ______________

## 2021-06-09 NOTE — PROGRESS NOTES
"[facility]  ENDOCRINOLOGY    Diabetes Note 3/8/2017    Sabra eLigh, 1985, [id number]          Reason for visit      1. DM type 1 (diabetes mellitus, type 1)    2. 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 old female who presents for follow up.  SUMMARY:  Sully returns today in f/u for her DM 1 with neuropathy and visual impairment.  She is here with a big ol' bag of [employer] because she got low while at dialysis.  She is rather proud of the fact that she got fish (fried and with buns) and two french fries instead of a large one, which costs more.  She completely missed the fact that all of this food is CHO, with the exception of the fish.  She tells me today that he daughter is likely pregnant and this is her focus today.  She states that because of this life is crazy.  She had some problems getting her strips and ended up paying out of pocket for them.  They are only $8 for a box of 50 and after grumping about the insurance company not paying for them, she says that she is willing to pay for them.  She now has some strips and has not yet checked again after eating.  She continues to use the talking glucometer, which is really helpful for her.  The problem with the meter though, is that she cannot see how much blood is on her finger when she sticks and sometimes doesn't get enough of a sample on the strip and has to retest. She has been spending time at the [facility] and using their hot tub and the \"[place]\" for exercise.  Her blood sugars are the usual roller-coaster highs and lows.  Her lowest number listed is 63 and the highest is 'HI'. She isn't testing as much as she should be doing, but she is unable to put numbers into her pump to use the Bolus Wizard because she cannot see it.  She continues to count the beeps to administer her insulin.  It is too bad that there are no pumps on the market that will talk.  Ironic. We discussed her most " recent A1c, which is 9.1.  It is up a bit from her last reading.  She continues at dialysis three times a week and tells me that [place].            Past Medical History     Patient Active Problem List   Diagnosis     Hypertension     Obstructive Sleep Apnea - does not use CPAP - she says she was told she needed it, but that it was not prescribed.     Anemia in CKD (chronic kidney disease)     GERD (gastroesophageal reflux disease)     Diabetic peripheral neuropathy associated with type 1 diabetes mellitus     DM type 1 (diabetes mellitus, type 1)     ESRD (end stage renal disease) on dialysis     RLS (restless legs syndrome) - ropinorole, 0.25 mg at hs at hospital discharge - November, 2015     Retinal detachment     Gastroparesis     SHANNON - shortness of breath with exertion that is limiting.     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     Cerebral atrophy     Sebaceous cyst of ear     Morbid obesity with BMI of 40.0-44.9, adult     Lipoma of abdominal wall     Chest pain     Stroke - states that her whole left side of her body was numb - this was at [facility] [place]     Cataract     Chronic diarrhea     CTS (carpal tunnel syndrome) - both sides - **possible** - [facility] does not clearly show this (February, 2017)        Family History       family history includes Aneurysm in her mother; Asthma in her brother, daughter, sister, and sister; Depression in her mother; Diabetes in her mother and sister; Hypertension in her mother; Sleep apnea in her brother, mother, sister, and sister; Snoring in her brother, mother, sister, sister, and sister; Stroke in her mother.    Social History      reports that she quit smoking about 19 years ago. Her smoking use included Cigarettes. She has a 0.25 pack-year smoking history. She has never used smokeless tobacco. She reports that she does not drink alcohol or use illicit drugs.      Review of Systems  "    Patient has no polyuria or polydipsia, no chest pain, dyspnea or TIA's, no numbness, tingling or pain in extremities  Remainder negative except as noted in HPI.    Vital Signs     Visit Vitals     /72 (Patient Site: Left Arm, Patient Position: Sitting, Cuff Size: Adult Large)  Comment (Patient Site): bp taken on forearm     Pulse 78     Ht 4' 11\" (1.499 m)     Wt 212 lb (96.2 kg)     BMI 42.82 kg/m2     Wt Readings from Last 3 Encounters:   03/07/17 212 lb (96.2 kg)   02/24/17 209 lb 9.6 oz (95.1 kg)   02/03/17 207 lb (93.9 kg)       Physical Exam     Constitutional:  Well developed, Well nourished  HENT:  Normocephalic,   Neck: Thyroid normal, No lymph nodes, Supple  Eyes:  PERRL, Conjunctiva pink  Respiratory:  Normal breath sounds, No respiratory distress  Cardiovascular:  Normal heart rate, Normal rhythm, No murmurs  GI:  Bowel sounds normal, Soft, No tenderness  Musculoskeletal:  No gross deformity or lesions, normal dorsalis pedis pulses  Skin: No acanthosis nigricans, lipoatrophy or lipodystrophy  Neurologic:  Alert & oriented x 3, nonfocal  Psychiatric:  Affect, Mood, Insight appropriate  Diabetic foot exam: no ulcers, charcot's or high risk calluses, abnormal monofilament exam          Assessment     1. DM type 1 (diabetes mellitus, type 1)    2. 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  Sully continues to live with [facility] and has since she got pregnant at 15 and went to live on her own in [place].  She continues to be her own worst enemy, but she does seem to want a kidney transplant, so she is doing her best, sometimes, to pay attention to her diabetes.  I get the feeling that this is often just when she is forced to do so.  We talked about her diet again and sometimes this is good when she eats at home, but not always because of what she eats there.  Clearly [employer] is not a good choice and she knows this too. She needs to test more regularly. "  Eat less CHO, and continue working on the exercise.  She will f/u with me in 6 weeks.      Misti Chapa  [facility]in[facility]  3/8/2017  10:15 AM        Lab Results     HEMOGLOBIN A1C   Date Value Ref Range Status   02/03/2017 9.1 (H) 3.5 - 6.0 % Final   10/15/2016 8.7 (H) 4.2 - 6.1 % Final   08/08/2016 9.9 (H) 3.5 - 6.0 % Final   04/27/2016 8.8 (H) 3.5 - 6.0 % Final   01/13/2016 10.0 (H) 3.5 - 6.0 % Final     CREATININE   Date Value Ref Range Status   10/17/2016 10.08 (HH) 0.60 - 1.10 mg/dL Final   10/16/2016 8.03 (HH) 0.60 - 1.10 mg/dL Final   10/15/2016 10.83 (HH) 0.60 - 1.10 mg/dL Final     MICROALBUMIN, RANDOM URINE   Date Value Ref Range Status   07/09/2015 499.19 (H) 0.00 - 1.99 mg/dL Final       CHOLESTEROL   Date Value Ref Range Status   04/27/2016 190 <=199 mg/dL Final     HDL CHOLESTEROL   Date Value Ref Range Status   04/27/2016 30 (L) >=50 mg/dL Final     LDL CALCULATED   Date Value Ref Range Status   04/27/2016  <=129 mg/dL Final     Comment:     Invalid, Triglycerides >400     TRIGLYCERIDES   Date Value Ref Range Status   04/27/2016 444 (H) <=149 mg/dL Final       Lab Results   Component Value Date    ALT 31 10/14/2016    AST 28 10/14/2016    ALKPHOS 281 (H) 10/14/2016    BILITOT 0.4 10/14/2016         Current Medications     Outpatient Medications Prior to Visit   Medication Sig Dispense Refill     acetaminophen (TYLENOL) 325 MG tablet Take 650 mg by mouth every 6 (six) hours as needed for pain.       acetone, urine, test (ACETONE, URINE, TEST) Strp Check urine for ketones if blood sugar is above 250.  Call Dr Soliz if ketones are moderate or large. 50 strip 3     alcohol swabs (ALCOHOL PADS) PadM Apply 1 Units topically daily. 70% 100 each 3     amLODIPine (NORVASC) 10 MG tablet Take 10 mg by mouth daily.       aspirin 81 MG EC tablet Take 81 mg by mouth daily.       blood glucose meter (GLUCOMETER) Use 1 each As Directed as needed. Dispense prodigy talking glucose meter 1 each 0     blood glucose  "test strips One each 4 times per day. Dispense prodigy strips for talking meter 100 each 11     calcium acetate (PHOSLO) 667 mg capsule Take 1 capsule (667 mg total) by mouth 3 (three) times a day. 90 capsule 11     cane Jie Use as directed 1 each 0     cholecalciferol, vitamin D3, 1,000 unit tablet Take 1 tablet (1,000 Units total) by mouth daily. 90 tablet 2     CLASSIC PRENATAL 28 mg iron- 800 mcg Tab Take 1 tablet by mouth daily. 90 tablet 3     CONTOUR NEXT STRIPS strips CHECK BLOOD SUGARS 4X/DAY DUE TO SEVERE HYPOGLYCEMIA. 300 strip 2     erythromycin ophthalmic ointment Administer 1 application to the right eye 4 (four) times a day.       gabapentin (NEURONTIN) 300 MG capsule Take 1 3-4 time a day for diabetic neuropathy pain. 120 capsule 5     glucagon, human recombinant, (GLUCAGON) 1 mg injection Use as directed for hypoglycemia (Patient taking differently: Inject 1 each under the skin. Use as directed for hypoglycemia) 1 each 3     glucose (DEX4 GLUCOSE) 4 GM chewable tablet Chew 16 g as needed for low blood sugar.       insulin aspart (NOVOLOG FLEXPEN) 100 unit/mL injection pen Inject 1-12 Units under the skin 3 (three) times a day with meals. 5 Pre-filled Pen Syringe 1     insulin needles, disposable, (BD INSULIN PEN NEEDLE UF SHORT) 31 X 5/16 \" Ndle Use As Directed 3 (three) times a day.       insulin pump cartridge Crtg 1 each Inject under the skin continuous. novolog insulin       insulin pump cartridge Inject under the skin continuous. Insulin pump discharge instructions from 10/15/16.  This is intended as additional information to the patient's PTA insulin pump order.  Continue with insulin pump at present settings. 1 each 0     insulin syringe-needle U-100 (BD INSULIN SYRINGE ULTRA-FINE) 0.3 mL 31 x 5/16\" Syrg Test 3 times daily 100 each 3     LANCING DEVICE MISC Use As Directed.       LANTUS SOLOSTAR 100 unit/mL (3 mL) pen Inject 8 units BID if pump fails (Patient taking differently: Inject 12 " "Units under the skin as needed (Only used when pump was not working). ) 15 mL PRN     losartan-hydrochlorothiazide (HYZAAR) 50-12.5 mg per tablet Take 1 tablet by mouth daily.       metoclopramide (REGLAN) 5 MG tablet Take 1 TID, AC.  **Discuss this with Dr. Moore on your next visit, Sabra.\"  Pharmacist:  Please read note. 90 tablet 1     metoprolol tartrate (LOPRESSOR) 50 MG tablet Take 1 tablet (50 mg total) by mouth 2 (two) times a day. 180 tablet 3     NOVOLOG 100 unit/mL injection USE UP TO 50 UNITS DAILY IN INSULIN PUMP 20 mL 5     omeprazole (PRILOSEC) 20 MG capsule TAKE ONE CAPSULE BY MOUTH ONE TIME DAILY 30 MINUTES BEFORE A MEAL TO PREVENT HEARTBURN 30 capsule 2     prenatal vit-iron fumarate-FA 28 mg iron- 800 mcg Tab Take 1 tablet by mouth daily. 90 tablet 0     rOPINIRole (REQUIP) 0.25 MG tablet Take 0.25 mg by mouth bedtime.       transparent dressings 2 3/8 X 2 3/4 \" Bndg Apply 1 patch topically every third day. 6 each 11     azithromycin (ZITHROMAX) 250 MG tablet Take 250 mg by mouth daily. Take 500 mg (2 x 250 mg tablets) on day 1 followed by 250 mg (1 tablet) on days 2-5.       No facility-administered medications prior to visit.            "

## 2021-06-09 NOTE — PROGRESS NOTES
2/24/2017    Start time: 12:00  Stop Time: 12:52   Session # 30. Session 4 this year.     Sabra Leigh is a 32 y.o. female is being seen today for    Chief Complaint   Patient presents with      Follow Up     Follow up in regards to ongoing symptom management of anxiety and depression.     New symptoms or complaints: None    Functional Impairment:   Personal: 3  Family: 3  Social: 3  Work: 4    Clinical assessment of mental status:   Sabra Leigh presented on time.   She was oriented in time. Can't see and needs support but remembers places she has been at and time. She was cooperative and dressed appropriately for this session and weather. Her memory was Normal cognitive functioning .  Her speech was  Within normal.  Language was appropriate.  Concentration and focus is Within normal. Psychosis is not noted or reported. She reports her mood is Congruent w/content of speech.  Affect is congruent with speech and is Congruent w/content of speech.  Fund of knowledge is adequate. Insight is adequate for therapy.    Suicidal/Homicidal Ideation present: Patient denies suicidal and homicidal ideations/means or plans.     Patient's impression of their current status: Patient presented to the clinic continue expressing her feelings and concerns for support and coping strategies. Patient reports doing well today. She continues having problems with her vision but remains hopeful and  optimistic. Did not think she could walk alone to get her lunch to the cafeteria and wants writer to assist. She indicated she has been eating responsibly and exercising to lose weight. She reports no change in her mood. Completed her updated treatment plan. Progress are shared by both side. Wants to decreased the frequency in therapy to every other week. Wants to be assured that she can come back to every week if her depression becomes worse.      Therapist impression of patients current state: This 32 y.o. Black or   "female presented on time for her therapy session.  Writer actively listened to the patient's concerns. Offered empathy and validated her feelings. Writer oriented the patient to the cafeteria to get her lunch as she can not see on her own.She was appreciative.  Writer assessed her safety. Reinforced her commitment to therapy and encouraged her to continue practicing coping strategies learned in previous sessions.active listening, validation, and empathy work well with the patient.      Assessment tools used today include: WHODAS 2.0: 58 %;MAISHA 7: 9; PHQ 9: 10; CAGE AID:0/4    Type of psychotherapeutic technique provided: Client centered and CBT, relaxation techniques.    Progress toward short term goals:\" I feel okay today\"    Review of long term goals: Updated Treatment Plan: 2/24/2017 next review: 5/26/2017    Diagnosis:     1. Major depressive disorder, recurrent episode, moderate    2. Adjustment disorder with mixed anxiety and depressed mood     No change    Plan and Follow-up:   1. Patient plans to continue taking her medications as prescribed.  2. Patient plans to continue to follow through her appointments as scheduled.  3.Patient plans to practice relaxation techniques she learned today as needed  4. Patient will see this therapist in a week.     Discharge Criteria/Planning: Client has chronic symptoms and ongoing therapy for maintenance stability recommended.    Performed and documented by BECKY Dozier- ANA; [facility] 2/24/2017    This note has been dictated using voice recognition software. Any grammatical or context distortions are unintentional and inherent to the software.    "

## 2021-06-09 NOTE — PROGRESS NOTES
4/7/2017     /70  Pulse 74  Wt 213 lb (96.6 kg)  BMI 43.02 kg/m2    Past Medical History:   Diagnosis Date     Blind     blind in right eye and limited vision in left eye     DM type 1 (diabetes mellitus, type 1)     insulin pump     ESRD (end stage renal disease) on dialysis      Gastroparesis 7/9/2015     GERD (gastroesophageal reflux disease)      History of vitrectomy     Both eyes     HTN (hypertension)      ROSE (obstructive sleep apnea)      PN (peripheral neuropathy)      Retinal detachment      RLS (restless legs syndrome)        Social History     Social History     Marital status: Single     Spouse name: N/A     Number of children: N/A     Years of education: N/A     Occupational History     Not on file.     Social History Main Topics     Smoking status: Former Smoker     Packs/day: 0.50     Years: 0.50     Types: Cigarettes     Quit date: 1/1/1998     Smokeless tobacco: Never Used      Comment: She smoked for 6 months as a teenager.     Alcohol use No     Drug use: No     Sexual activity: No      Comment: lives with female partner of 10 years     Other Topics Concern     Not on file     Social History Narrative    Patient lives with her partner Krystyna and her daughter.       Family History   Problem Relation Age of Onset     Hypertension Mother      Diabetes Mother      Depression Mother      Stroke Mother      Sleep apnea Mother      Aneurysm Mother      Cerebral aneursym, untreated per patient.     Snoring Mother      Asthma Sister      Sleep apnea Sister      Snoring Sister      Asthma Brother      Sleep apnea Brother      Snoring Brother      Asthma Sister      Snoring Sister      Sleep apnea Sister      Diabetes Sister      Type 2 DM     Snoring Sister      Asthma Daughter        As part of this visit I have today personally reviewed past medical history, family medical history, social history (specifically including tobacco use), current medications and intolerances/allergies.  I have  "updated and/or or corrected these areas of history as may have been appropriate.    Allergies   Allergen Reactions     Apple Juice Diarrhea     Lactose      Metformin Hives     Orange Juice Diarrhea       Current Outpatient Prescriptions   Medication Sig Note     acetaminophen (TYLENOL) 325 MG tablet Take 650 mg by mouth every 6 (six) hours as needed for pain.      acetone, urine, test (ACETONE, URINE, TEST) Strp Check urine for ketones if blood sugar is above 250.  Call Dr Soliz if ketones are moderate or large.      alcohol swabs (ALCOHOL PADS) PadM Apply 1 Units topically daily. 70%      amLODIPine (NORVASC) 10 MG tablet Take 10 mg by mouth daily.      aspirin 81 MG EC tablet Take 81 mg by mouth daily.      blood glucose meter (GLUCOMETER) Use 1 each As Directed as needed. Dispense prodigy talking glucose meter      blood glucose test strips One each 4 times per day. Dispense prodigy strips for talking meter      calcium acetate (PHOSLO) 667 mg capsule Take 1 capsule (667 mg total) by mouth 3 (three) times a day.      cane Jie Use as directed      cholecalciferol, vitamin D3, 1,000 unit tablet Take 1 tablet (1,000 Units total) by mouth daily.      CLASSIC PRENATAL 28 mg iron- 800 mcg Tab Take 1 tablet by mouth daily.      CONTOUR NEXT STRIPS strips CHECK BLOOD SUGARS 4X/DAY DUE TO SEVERE HYPOGLYCEMIA.      erythromycin ophthalmic ointment Administer 1 application to the right eye 4 (four) times a day.      gabapentin (NEURONTIN) 300 MG capsule Take 1 3-4 time a day for diabetic neuropathy pain.      glucagon, human recombinant, (GLUCAGON) 1 mg injection Use as directed for hypoglycemia (Patient taking differently: Inject 1 each under the skin. Use as directed for hypoglycemia) 12/23/2016: \"Probably September (2016)\" was the last use.  12/23/2016      glucose (DEX4 GLUCOSE) 4 GM chewable tablet Chew 16 g as needed for low blood sugar.      HUMALOG 100 unit/mL injection USE UP TO 50 UNITS DAILY IN INSULIN PUMP      " "insulin lispro (HUMALOG KWIKPEN) 100 unit/mL pen Inject 1-12 Units, 3 times daily with meals      insulin needles, disposable, (BD INSULIN PEN NEEDLE UF SHORT) 31 X 5/16 \" Ndle Use As Directed 3 (three) times a day.      insulin pump cartridge Crtg 1 each Inject under the skin continuous. novolog insulin      insulin pump cartridge Inject under the skin continuous. Insulin pump discharge instructions from 10/15/16.  This is intended as additional information to the patient's PTA insulin pump order.  Continue with insulin pump at present settings.      insulin syringe-needle U-100 (BD INSULIN SYRINGE ULTRA-FINE) 0.3 mL 31 x 5/16\" Syrg Test 3 times daily      LANCING DEVICE MISC Use As Directed.      LANTUS SOLOSTAR 100 unit/mL (3 mL) pen Inject 8 units BID if pump fails (Patient taking differently: Inject 12 Units under the skin as needed (Only used when pump was not working). )      losartan-hydrochlorothiazide (HYZAAR) 50-12.5 mg per tablet Take 1 tablet by mouth daily.      metoclopramide (REGLAN) 5 MG tablet Take 1 TID, AC.  **Discuss this with Dr. Moore on your next visit, Sabra.\"  Pharmacist:  Please read note.      metoprolol tartrate (LOPRESSOR) 50 MG tablet Take 1 tablet (50 mg total) by mouth 2 (two) times a day.      omeprazole (PRILOSEC) 20 MG capsule TAKE ONE CAPSULE BY MOUTH ONE TIME DAILY 30 MINUTES BEFORE A MEAL TO PREVENT HEARTBURN      prenatal vit-iron fumarate-FA 28 mg iron- 800 mcg Tab Take 1 tablet by mouth daily.      rOPINIRole (REQUIP) 0.25 MG tablet Take 0.25 mg by mouth bedtime.      transparent dressings 2 3/8 X 2 3/4 \" Bndg Apply 1 patch topically every third day.        Patient Active Problem List   Diagnosis     Hypertension     Obstructive Sleep Apnea - does not use CPAP - she says she was told she needed it, but that it was not prescribed.     Anemia in CKD (chronic kidney disease)     GERD (gastroesophageal reflux disease)     Diabetic peripheral neuropathy associated with type 1 " diabetes mellitus     DM type 1 (diabetes mellitus, type 1)     ESRD (end stage renal disease) on dialysis     RLS (restless legs syndrome) - ropinorole, 0.25 mg at hs at hospital discharge - November, 2015     Retinal detachment     Gastroparesis     SHANNON - shortness of breath with exertion that is limiting.     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     Cerebral atrophy     Sebaceous cyst of ear     Morbid obesity with BMI of 40.0-44.9, adult     Lipoma of abdominal wall     Chest pain     Stroke - states that her whole left side of her body was numb - this was [place] [facility] [place]     Cataract     Chronic diarrhea     CTS (carpal tunnel syndrome) - both sides - **possible** - NCV does not clearly show this (February, 2017)       Diabetes -her diabetes is managed elsewhere.  She has end-stage renal disease and is currently on dialysis.  Lab Results   Component Value Date    HGBA1C 9.1 (H) 02/03/2017     Lab Results   Component Value Date    MICROALBUR 499.19 (H) 07/09/2015       Hypovitaminosis D -  She is on replacement now.  Vitamin D, Total (25-Hydroxy)   Date Value Ref Range Status   08/12/2016 25.8 (L) 30.0 - 80.0 ng/mL Final    normal TSH within 1 year.  Thyroid status -    Lab Results   Component Value Date    TSH 1.52 02/03/2017       CBC monitoring -she has the anemia of chronic disease.  She has mild thrombocytopenia.  Lab Results   Component Value Date    WBC 5.9 10/15/2016    HGB 11.0 (L) 10/15/2016    HCT 33.5 (L) 10/15/2016    MCV 96 10/15/2016     (L) 10/15/2016       CMP review -she has blood tests routinely at dialysis.  I reviewed her studies from last October.  Results for orders placed or performed in visit on 10/14/16   Comprehensive Metabolic Panel   Result Value Ref Range    Sodium 139 136 - 145 mmol/L    Potassium 5.0 3.5 - 5.0 mmol/L    Chloride 97 (L) 98 - 107 mmol/L    CO2 28 22 - 31 mmol/L    Anion Gap, Calculation 14 5 - 18 mmol/L    Glucose 296  "(H) 70 - 125 mg/dL    BUN 41 (H) 8 - 22 mg/dL    Creatinine 9.02 (HH) 0.60 - 1.10 mg/dL    GFR MDRD Af Amer 6 (L) >60 mL/min/1.73m2    GFR MDRD Non Af Amer 5 (L) >60 mL/min/1.73m2    Bilirubin, Total 0.4 0.0 - 1.0 mg/dL    Calcium 9.7 8.5 - 10.5 mg/dL    Protein, Total 6.7 6.0 - 8.0 g/dL    Albumin 3.4 (L) 3.5 - 5.0 g/dL    Alkaline Phosphatase 281 (H) 45 - 120 U/L    AST 28 0 - 40 U/L    ALT 31 0 - 45 U/L     Chief complaint: Left breast mass    Present illness: She complains of a mass which was medial to the left nipple.  Left breast lump - noticed about a week or two ago.  She thought it might be due to her insulin pump, as she keeps it adjacent to that area.  Nipple discharge - not that she knows about.  She has \"white stuff in my nipples\" on both sides.  This has been there for \"a while.\"  (On exam, there is no white stuff on the right side at all.  There is what appears to be a high water in areas circling the left nipple  robbi salt deposit.)  Breast tenderness - \"it hurts just a little bit, but not as much as it did.\"   She puts all sorts of things into her bra/shirt/top.  Mammogram - she is too young and has therefore never had one for routine screening.  Trauma to the breast - she is unaware of any trauma.  Change in size of either breast - not to her knowledge or notice.    bernice Schaefer, was present for this breast exam.  Lashaun is 14-year-old daughter was present for this examination, as well.  Examined upright, hands on hips, hands over head and lying down.  There is no mass in the left breast.  I did not examine the right breast other than visually.  There is no discharge on the left.  I could not provoke discharge, either.  There is nothing at 9 o'clock, where she says she felt the lump initially  There is nothing in the area around the areola.  There is nothing behind the areola that I can feel.  There are no lymph nodes palpable in either axilla.  She is remarkably ticklish, " however.      Impression    1.  Breast mass, resolved, left breast  2.  Type 1 diabetes with complications including both retinopathy, and nephropathy.  3.  End-stage renal disease with dialysis.    Plan    1.  There is no indication for a mammogram.  I cannot find a mass on her breast exam.  There is what looks almost like water spots around the areola on the left breast, suggesting that there may have been a discharge, but I cannot provoke any discharge today at all.  2.  Return visit scheduled.  3.  Diabetes is managed elsewhere.  4.  Avoid repeated touching of the breast.    Much or all of the text in this note was generated through the use of Dragon Dictate voice-to-text software.  Errors in spelling or words which seem out of context are unintentional.  Dragon has a significant issue with pronouns and, of course, homonyms.  Errors with words of this sort may escape editing.        Patient Instructions   1.  I do not find anything in your left breast.  If I had found something, I would have gotten a mammogram, but there was nothing there that bothered me in any way.    2.  First mammogram will be at age 39, unless you have symptoms.    3.  Let me know if you feel something again.    4.  Do not examine your breast more often than once a week, unless something hurts.    5.  Come back and see me in three months.

## 2021-06-09 NOTE — PROGRESS NOTES
3/24/2017    Start time: 13:00  Stop Time: 14:00   Session # 31. Session 5 this year.     Sabra Leigh is a 32 y.o. female is being seen today for    Chief Complaint   Patient presents with      Follow Up     Follow up in regards to ongoing symptom management of anxiety and depression.     New symptoms or complaints: None    Functional Impairment:   Personal: 3  Family: 3  Social: 3  Work: 4    Clinical assessment of mental status:   Sabra Leigh presented on time.   She was oriented in time. Can't see and needs support but remembers places she has been at and time. She was cooperative and dressed appropriately for this session and weather. Her memory was Normal cognitive functioning .  Her speech was  Within normal.  Language was appropriate.  Concentration and focus is Within normal. Psychosis is not noted or reported. She reports her mood is Congruent w/content of speech.  Affect is congruent with speech and is Congruent w/content of speech.  Fund of knowledge is adequate. Insight is adequate for therapy.    Suicidal/Homicidal Ideation present: Patient denies suicidal and homicidal ideations/means or plans.     Patient's impression of their current status: Patient presented to the clinic continue expressing her feelings and concerns for support and coping strategies. Patient reports having had a rough month with family issues: Grand mother passed away late in February; her ex- partner's sister passed away the first week on March; her brother in law is on life support; patient's brother had a stroke the second week of March; patient's step sister's mother is on life support due to blood clots. Patient just found out that her ex co worker passed away. Patient reports she is worried about her mother due to having had 3 strokes and hips issues. Patient's daughter has been involved in some behaviors that patient supports but can also put her in trouble academically. Patient shared she herself feels she might  "not live longer given her issues. She is not sure what Life is now and wonders what might happen to her daughter is she . When asked how she feels today, she stated that she feels numb and eventually she will find feelings.  Patient also stated she knows she can't hurt herself because of her daughter. She plans to continues to come to therapy to process these issues.      Therapist impression of patients current state: This 32 y.o. Black or  female presented on time for her therapy session.  Writer actively listened to the patient's concerns. Offered empathy and validated her feelings. Writer assessed her safety. Reinforced her commitment to therapy and encouraged her to continue practicing coping strategies learned in previous sessions.active listening, validation, and empathy work well with the patient.  Patient was able to process some of her losses today.Patient has gotten tough time related to her blindness, diabetes and kidney problems that required dialysis 3 times a week. She appears resilient and still hopeful but with some hesitations. She wants to hear that she is being heard and listened to which was done today in this session. She was appreciative as she reported to this writer.     Assessment tools used today include: How do you feel today?    Type of psychotherapeutic technique provided: Client centered and CBT, relaxation techniques.    Progress toward short term goals:\" Too much going on but I am okay\"    Review of long term goals: Updated Treatment Plan: 2017 next review: 2017    Diagnosis:     1. Adjustment disorder with mixed anxiety and depressed mood    2. Major depressive disorder, recurrent episode, moderate     No change    Plan and Follow-up:   1. Patient plans to continue taking her medications as prescribed.  2. Patient plans to continue to follow through her appointments as scheduled.  3.Patient plans to practice relaxation techniques she learned today as " needed  4. Patient will see this therapist in 2 weeks.     Discharge Criteria/Planning: Client has chronic symptoms and ongoing therapy for maintenance stability recommended.    Performed and documented by BECKY Dozier- ANA; [facility] 3/24/2017    This note has been dictated using voice recognition software. Any grammatical or context distortions are unintentional and inherent to the software.

## 2021-06-10 NOTE — PROGRESS NOTES
[facility]  ENDOCRINOLOGY    Diabetes Note 5/1/2017    Sabra Leigh, 1985, [id number]          Reason for visit      1. DM type 1 (diabetes mellitus, type 1)    2. Diabetic peripheral neuropathy associated with type 1 diabetes mellitus    3. Essential hypertension        HPI     Sabra Leigh is a very pleasant 32 y.o. old female who presents for follow up.  SUMMARY:  1.Sully returns today in f/u for DM1.  She reports that her daughter is not pregnant and we have a moment of rejoicing together.  She continues to have a contentious relationship with her caregiver and she tells me that she is mad at her right now.  My response is that this is nothing new. She reports that she feels that she is doing better.  She continues to try to exercise, and she is using her exercise bike regularly.  She continues to try to lose weight.  She is out walking with her daughter.  She notes that she has had some lows, and her meter confirms this.  She has had a couple of really high numbers.  She continues to use the manual bolus because she can then count how many units she is giving herself with her pump.  She is missing a lot of insulin by using this method, but she is not equipped to do it any other way.  She does have a Prodigy, which tells her what her blood sugar is audibly, but she is unable to translate that information into her pump.  She tells me that she has a Juan Contour somewhere - this meter will transmit the information to the pump automatically.      2. Her feet continue to be neuropathic.  I have been on her to do a better job with her skin.  She has been using lotion daily and they are a lot less dry.    3. She continues to take Metoprolol and Hyzaar daily for hypertension.  Her BP today was 106/68.1. 1    Blood glucose data:  59 - 570    Past Medical History     Patient Active Problem List   Diagnosis     Hypertension     Obstructive Sleep Apnea - does not use CPAP - she says she was told she needed it,  but that it was not prescribed.     Anemia in CKD (chronic kidney disease)     GERD (gastroesophageal reflux disease)     Diabetic peripheral neuropathy associated with type 1 diabetes mellitus     DM type 1 (diabetes mellitus, type 1)     ESRD (end stage renal disease) on dialysis     RLS (restless legs syndrome) - ropinorole, 0.25 mg at hs at hospital discharge - November, 2015     Retinal detachment     Gastroparesis     SHANNON - shortness of breath with exertion that is limiting.     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     Cerebral atrophy     Sebaceous cyst of ear     Morbid obesity with BMI of 40.0-44.9, adult     Lipoma of abdominal wall     Chest pain     Stroke - states that her whole left side of her body was numb - this was at [facility] [place]     Cataract     Chronic diarrhea     CTS (carpal tunnel syndrome) - both sides - **possible** - NCV does not clearly show this (February, 2017)     Breast mass, left breast - no evidence of breast mass on clinical examination of 4/7/17        Family History       family history includes Aneurysm in her mother; Asthma in her brother, daughter, sister, and sister; Depression in her mother; Diabetes in her mother and sister; Hypertension in her mother; Sleep apnea in her brother, mother, sister, and sister; Snoring in her brother, mother, sister, sister, and sister; Stroke in her mother.    Social History      reports that she quit smoking about 19 years ago. Her smoking use included Cigarettes. She has a 0.25 pack-year smoking history. She has never used smokeless tobacco. She reports that she does not drink alcohol or use illicit drugs.      Review of Systems     Patient has no polyuria or polydipsia, no chest pain, dyspnea or TIA's, no numbness, tingling or pain in extremities  Remainder negative except as noted in HPI.    Vital Signs     /68 (Patient Site: Left Arm, Patient Position: Sitting, Cuff Size: Adult Large)   "Pulse 80  Ht 4' 11\" (1.499 m)  Wt 218 lb (98.9 kg)  BMI 44.03 kg/m2  Wt Readings from Last 3 Encounters:   04/25/17 218 lb (98.9 kg)   04/07/17 213 lb (96.6 kg)   03/07/17 212 lb (96.2 kg)       Physical Exam     Constitutional:  Well developed, Well nourished  HENT:  Normocephalic,   Neck: Thyroid normal, No lymph nodes, Supple  Eyes:  PERRL, Conjunctiva pink  Respiratory:  Normal breath sounds, No respiratory distress  Cardiovascular:  Normal heart rate, Normal rhythm, No murmurs  GI:  Bowel sounds normal, Soft, No tenderness  Musculoskeletal:  No gross deformity or lesions, normal dorsalis pedis pulses  Skin: No acanthosis nigricans, lipoatrophy or lipodystrophy  Neurologic:  Alert & oriented x 3, nonfocal  Psychiatric:  Affect, Mood, Insight appropriate  Diabetic foot exam: feet are dry and scaly - she is trying to use lotion daily - complete loss of sensation and vibratory sensation      Assessment     1. DM type 1 (diabetes mellitus, type 1)    2. Diabetic peripheral neuropathy associated with type 1 diabetes mellitus    3. Essential hypertension        Plan     I have scheduled Sully with a DE for closer monitoring.  I think that if we can get more of a coaching situation going, she will get better control and thus improved outcome.  She is amenable to this.  I will continue to see her regularly for the same reason.    Continue to work on her feet - unfortunately, we have lost a lot of ground because of the uncontrolled DM, but she can keep her skin in better shape to avoid any sores    Continue to take the Metoprolol and Hyzaar for hypertension.    Time spent with pt today: 25 min with >50% spent in counseling and coordination of care.      Misti WILCOX Endocrinology  5/1/2017  7:17 AM      Lab Results     Hemoglobin A1c   Date Value Ref Range Status   02/03/2017 9.1 (H) 3.5 - 6.0 % Final   10/15/2016 8.7 (H) 4.2 - 6.1 % Final   08/08/2016 9.9 (H) 3.5 - 6.0 % Final   04/27/2016 8.8 (H) 3.5 - 6.0 % " Final   01/13/2016 10.0 (H) 3.5 - 6.0 % Final     Creatinine   Date Value Ref Range Status   10/17/2016 10.08 (HH) 0.60 - 1.10 mg/dL Final   10/16/2016 8.03 (HH) 0.60 - 1.10 mg/dL Final   10/15/2016 10.83 (HH) 0.60 - 1.10 mg/dL Final     Microalbumin, Random Urine   Date Value Ref Range Status   07/09/2015 499.19 (H) 0.00 - 1.99 mg/dL Final       Cholesterol   Date Value Ref Range Status   04/27/2016 190 <=199 mg/dL Final     HDL Cholesterol   Date Value Ref Range Status   04/27/2016 30 (L) >=50 mg/dL Final     LDL Calculated   Date Value Ref Range Status   04/27/2016  <=129 mg/dL Final     Comment:     Invalid, Triglycerides >400     Triglycerides   Date Value Ref Range Status   04/27/2016 444 (H) <=149 mg/dL Final       Lab Results   Component Value Date    ALT 31 10/14/2016    AST 28 10/14/2016    ALKPHOS 281 (H) 10/14/2016    BILITOT 0.4 10/14/2016         Current Medications     Outpatient Medications Prior to Visit   Medication Sig Dispense Refill     acetaminophen (TYLENOL) 325 MG tablet Take 650 mg by mouth every 6 (six) hours as needed for pain.       acetone, urine, test (ACETONE, URINE, TEST) Strp Check urine for ketones if blood sugar is above 250.  Call Dr Soliz if ketones are moderate or large. 50 strip 3     alcohol swabs (ALCOHOL PADS) PadM Apply 1 Units topically daily. 70% 100 each 3     aspirin 81 MG EC tablet Take 81 mg by mouth daily.       blood glucose meter (GLUCOMETER) Use 1 each As Directed as needed. Dispense prodigy talking glucose meter 1 each 0     blood glucose test strips One each 4 times per day. Dispense prodigy strips for talking meter 100 each 11     calcium acetate (PHOSLO) 667 mg capsule Take 1 capsule (667 mg total) by mouth 3 (three) times a day. (Patient taking differently: Take 1 capsule by mouth 2 (two) times a day. Indications: Renal Osteodystrophy with Hyperphosphatemia) 90 capsule 11     cane Jie Use as directed 1 each 0     cholecalciferol, vitamin D3, 1,000 unit tablet  "Take 1 tablet (1,000 Units total) by mouth daily. 90 tablet 2     CLASSIC PRENATAL 28 mg iron- 800 mcg Tab Take 1 tablet by mouth daily. 90 tablet 3     erythromycin ophthalmic ointment Administer 1 application to the right eye 4 (four) times a day.       gabapentin (NEURONTIN) 300 MG capsule Take 1 3-4 time a day for diabetic neuropathy pain. 120 capsule 5     glucagon, human recombinant, (GLUCAGON) 1 mg injection Use as directed for hypoglycemia (Patient taking differently: Inject 1 each under the skin. Use as directed for hypoglycemia) 1 each 3     glucose (DEX4 GLUCOSE) 4 GM chewable tablet Chew 16 g as needed for low blood sugar.       insulin lispro (HUMALOG KWIKPEN) 100 unit/mL pen Inject 1-12 Units, 3 times daily with meals (Patient taking differently: For use if insulin pump fails) 5 adj dose pen 0     insulin needles, disposable, (BD INSULIN PEN NEEDLE UF SHORT) 31 X 5/16 \" Ndle Use As Directed 3 (three) times a day.       insulin pump cartridge Crtg 1 each Inject under the skin continuous. novolog insulin       insulin pump cartridge Inject under the skin continuous. Insulin pump discharge instructions from 10/15/16.  This is intended as additional information to the patient's PTA insulin pump order.  Continue with insulin pump at present settings. 1 each 0     insulin syringe-needle U-100 (BD INSULIN SYRINGE ULTRA-FINE) 0.3 mL 31 x 5/16\" Syrg Test 3 times daily 100 each 3     LANCING DEVICE MISC Use As Directed.       LANTUS SOLOSTAR 100 unit/mL (3 mL) pen Inject 8 units BID if pump fails (Patient taking differently: Inject 12 Units under the skin as needed (Only used when pump was not working). ) 15 mL PRN     losartan-hydrochlorothiazide (HYZAAR) 50-12.5 mg per tablet Take 1 tablet by mouth daily.       metoclopramide (REGLAN) 5 MG tablet Take 1 TID, AC.  **Discuss this with Dr. Moore on your next visit, Sabra.\"  Pharmacist:  Please read note. 90 tablet 1     metoprolol tartrate (LOPRESSOR) 50 MG " "tablet Take 1 tablet (50 mg total) by mouth 2 (two) times a day. 180 tablet 3     omeprazole (PRILOSEC) 20 MG capsule TAKE ONE CAPSULE BY MOUTH ONE TIME DAILY 30 MINUTES BEFORE A MEAL TO PREVENT HEARTBURN 30 capsule 2     prenatal vit-iron fumarate-FA 28 mg iron- 800 mcg Tab Take 1 tablet by mouth daily. 90 tablet 0     rOPINIRole (REQUIP) 0.25 MG tablet Take 0.25 mg by mouth bedtime.       transparent dressings 2 3/8 X 2 3/4 \" Bndg Apply 1 patch topically every third day. 6 each 11     amLODIPine (NORVASC) 10 MG tablet Take 10 mg by mouth daily.       CONTOUR NEXT STRIPS strips CHECK BLOOD SUGARS 4X/DAY DUE TO SEVERE HYPOGLYCEMIA. 300 strip 2     HUMALOG 100 unit/mL injection USE UP TO 50 UNITS DAILY IN INSULIN PUMP 20 mL 5     No facility-administered medications prior to visit.            " Past 6 months

## 2021-06-10 NOTE — PROGRESS NOTES
Sully stated its been forever since you called me. You need to call me sooner. We talked about this further and that she is doing good with a 2 month outreach. We talked about how there isn't much of a change in her goals and she has a lot of support at home.  Patient understood and was okay with a 2 month outreach. Patient has gotten to dependent on being contacted so often and has a hard time with separation.

## 2021-06-10 NOTE — PROGRESS NOTES
5/19/2017    Start time: 14:00  Stop Time: 15:00   Session # 35. Session 9 this year.     Sabra Leigh is a 32 y.o. female is being seen today for    Chief Complaint   Patient presents with      Follow Up     Follow up in regards to ongoing symptom management of anxiety and depression.     New symptoms or complaints: None    Functional Impairment:   Personal: 3  Family: 3  Social: 3  Work: 4    Clinical assessment of mental status:   Sabra Leigh presented on time.   She was oriented in time. Can't see and needs support but remembers places she has been at and time. She was cooperative and dressed appropriately for this session and weather. Her memory was Normal cognitive functioning .  Her speech was  Within normal.  Language was appropriate.  Concentration and focus is Within normal. Psychosis is not noted or reported. She reports her mood is Depressed.  Affect is congruent with speech and is Congruent w/content of speech and sad.  Fund of knowledge is adequate. Insight is adequate for therapy.    Suicidal/Homicidal Ideation present: Patient denies suicidal and homicidal ideations/means or plans.     Patient's impression of their current status: Patient presented to the clinic continue expressing her feelings and concerns for support and coping strategies. Report no new symptoms of depression or anxiety; shares she is now in good terms with her ex partner who has been in her life for 11 years helping her in many ways even after breaking up intimately. She reports she and her daughter relationship is now better.  Eye has been hurting. Has recently seen a specialist.  She continues to listen to Es Queen message of hope and continues to focus on the positive. Reports she has hope for recovery and wants to refrain friom getting angry as a response to her disability. Patient plans to come back in 2 weeks for more support.     Therapist impression of patients current state: This 32 y.o. Black or   "American female presented on time for her therapy session. It was noted that patient went to the bathroom several times during the session. Writer offered empathy and validated her feelings. Writer also reinforced the patient's commitment to therapy and encouraged her to continue practicing coping strategies  learned today. Took the patient to walk in the building for 5 minutes which she was appreciative for. Patient has gotten tough time related to her blindness, diabetes and kidney problems that required dialysis 3 times a week which she appears and she was appreciative about today's intervention. Patient responds well to a message of that empowerment and  hope.     Assessment tools used today include: How do you feel today?    Type of psychotherapeutic technique provided: Client centered and CBT    Progress toward short term goals:\"less depressed today\"    Review of long term goals: Updated Treatment Plan: 2/24/2017 next review: 5/26/2017    Diagnosis:     1. Adjustment disorder with mixed anxiety and depressed mood    2. Major depressive disorder, recurrent episode, moderate     No change    Plan and Follow-up:     1. Patient plans to continue taking her medications as prescribed.  2. Patient will continue communicating with daughter to resolve their differences.   3.Patient plans to spend sometime with her family this weekend  4. Patient will see this therapist in 2 weeks.     Discharge Criteria/Planning: Client has chronic symptoms and ongoing therapy for maintenance stability recommended.    Performed and documented by BECKY Dozier- ANA; [facility][note 3] 5/19/2017    This note has been dictated using voice recognition software. Any grammatical or context distortions are unintentional and inherent to the software.        "

## 2021-06-10 NOTE — PROGRESS NOTES
5/5/2017    Start time: 14:00  Stop Time: 15:00   Session # 34. Session 8 this year.     Sabra Leigh is a 32 y.o. female is being seen today for    Chief Complaint   Patient presents with      Follow Up     Follow up in regards to ongoing symptom management of anxiety and depression.     New symptoms or complaints: None    Functional Impairment:   Personal: 3  Family: 3  Social: 3  Work: 4    Clinical assessment of mental status:   Sabra Leigh presented on time.   She was oriented in time. Can't see and needs support but remembers places she has been at and time. She was cooperative and dressed appropriately for this session and weather. Her memory was Normal cognitive functioning .  Her speech was  Within normal.  Language was appropriate.  Concentration and focus is Within normal. Psychosis is not noted or reported. She reports her mood is Depressed.  Affect is congruent with speech and is Congruent w/content of speech and sad.  Fund of knowledge is adequate. Insight is adequate for therapy.    Suicidal/Homicidal Ideation present: Patient denies suicidal and homicidal ideations/means or plans.     Patient's impression of their current status: Patient presented to the clinic continue expressing her feelings and concerns for support and coping strategies. Report no new symptoms of depression or anxiety but has some incident with her ex her ex partner which they were able to resolve. She reports she and her daughter are now doing better. She shared she needs to keep up with her parenting responsibility and wants her daughter be responsible of her own behaviors. Patient once again shared she expects her daughter to focus on her school and be successful academically. She has been listening to Es Rangel and continues to focus on the positive. Reports she has hope for recovery and wants to refrain friom getting angry as a response to her disability. Patient plans to come back in 2 weeks for more support.  "    Therapist impression of patients current state: This 32 y.o. Black or  female presented on time for her therapy session. It was noted that patient went to the bathroom several times during the session. Writer offered empathy and validated her feelings. Writer also reinforced the patient's commitment to therapy and encouraged her to continue practicing coping strategies  learned today.Patient has gotten tough time related to her blindness, diabetes and kidney problems that required dialysis 3 times a week which appears the reasons she has high expectations from her teen daughter creating emotions deregulations. Patient was appreciative about today's intervention that focused on empowerment and message of hope.     Assessment tools used today include: How do you feel today?    Type of psychotherapeutic technique provided: Client centered and CBT, family dynamic.    Progress toward short term goals:\"less depressed today\"    Review of long term goals: Updated Treatment Plan: 2/24/2017 next review: 5/26/2017    Diagnosis:     1. Adjustment disorder with mixed anxiety and depressed mood    2. Major depressive disorder, recurrent episode, moderate     No change    Plan and Follow-up:   1. Patient plans to continue taking her medications as prescribed.  2. Patient will increase communication with daughter to resolve their differences.   3. Patient will see this therapist in 2 weeks.     Discharge Criteria/Planning: Client has chronic symptoms and ongoing therapy for maintenance stability recommended.    Performed and documented by BECKY Dozier- ANA; [facility] [place] 5/5/2017    This note has been dictated using voice recognition software. Any grammatical or context distortions are unintentional and inherent to the software.        "

## 2021-06-10 NOTE — PROGRESS NOTES
Assessment: pt seen today for f/u. Her pump ran out of insulin this morning so she brings all of her supplies in for assistance in set change. This was done at our visit.   Pt is also inquiring about the new medtronic pump. I did look into this a couple months ago. I spoke with Mary Jo Griffith who informs it would be difficult for pt with the new lock screen, it would need to be prescribed off label. Although, pt is not able to get a pump upgrade until 6/25/17. Pt is very upset about this today. She is very insistent that she needs the new pump. I did email Mary Jo again during our appt to see what the cost would be, at pt's request. Maybe she could set up a payment plan? We did practice with the new pump and lock screen today. She did have some difficulty but was able to get it unlocked twice (out of about 15 attempts). With more practice she may be able to do it. She also states that she is never alone so someone could help her. Regardless, she is not eligible for an upgrade. Will see what Mary Jo response is.   BG was 302 mg/dl in clinic - after having been off the pump for about 2 hours.   Of note - I did connect her zoila meter to her pump and it did work. However, I don't know how this is useful for pt as she cannot read the BG. She will continue with her talking meter.   Of note - pt is wanting to loose weight and is working on increasing her exercise. Encouraged her to do this. Pt will f/u with Salome in 1 month. I will call her sooner if I hear any type of solutions to the pump ugrade issue.     Plan: f/u with Salome 1 month     Subjective and Objective:      Sabra SAAD Leigh is referred by Salome Chapa NP for Diabetes Education.     Lab Results   Component Value Date    HGBA1C 9.1 (H) 02/03/2017       Education:     Monitoring   Meter (per above goals): Assessed and Discussed  Monitoring: Assessed and Discussed  BG goals: Discussed    Nutrition Management  Nutrition Management: Assessed and Discussed  Weight:  Discussed  Portions/Balance: Assessed and Discussed  Carb ID/Count: Assessed and Discussed  Label Reading: Not addressed  Heart Healthy Fats: Not addressed  Menu Planning: Assessed and Discussed  Dining Out: Assessed and Discussed  Physical Activity: Assessed and Discussed  Injected Medications: Discussed   Storage/Exp:Competent   Site Rotation: Competent   Sites Assessed: yes    Diabetes Disease Process: Discussed    Acute Complications: Prevent, Detect, Treat:  Hypoglycemia: Assessed and Discussed  Hyperglycemia: Assessed and Discussed  Sick Days: Competent  Driving: does not drive    Chronic Complications  Foot Care:Needs instruction/review at follow-up  Skin Care: Not addressed  Eye: Discussed  ABC: Discussed  Teeth:Not addressed  Goal Setting and Problem Solving: Assessed and Discussed  Barriers: Assessed and Discussed  Psychosocial Adjustments: Assessed and Discussed      Time spent with the patient: 60 minutes for diabetes education and counseling.   Previous Education: yes  Visit Type:GRAHAM Doe  5/8/2017      I agree with the aforementioned diabetes plan.  [facility]  5/8/2017  12:55 PM

## 2021-06-10 NOTE — PROGRESS NOTES
4/21/2017    Start time: 14:00  Stop Time: 15:00   Session # 33. Session 7 this year.     Sabra Leigh is a 32 y.o. female is being seen today for    Chief Complaint   Patient presents with      Follow Up     Follow up in regards to ongoing symptom management of anxiety and depression.     New symptoms or complaints: None    Functional Impairment:   Personal: 3  Family: 3  Social: 3  Work: 4    Clinical assessment of mental status:   Sabra Leigh presented on time.   She was oriented in time. Can't see and needs support but remembers places she has been at and time. She was cooperative and dressed appropriately for this session and weather. Her memory was Normal cognitive functioning .  Her speech was  Within normal.  Language was appropriate.  Concentration and focus is Within normal. Psychosis is not noted or reported. She reports her mood is Depressed.  Affect is congruent with speech and is Congruent w/content of speech and sad.  Fund of knowledge is adequate. Insight is adequate for therapy.    Suicidal/Homicidal Ideation present: Patient denies suicidal and homicidal ideations/means or plans.     Patient's impression of their current status: Patient presented to the clinic continue expressing her feelings and concerns for support and coping strategies. Report no new symptoms of depression or anxiety but endorsed stomach problems that make her to women's BD constantly. Reports no need to see a provider. Has been communicating with her ex partner about her daughter and her academics. She reports she and her daughter are now doing better. She shared she needs to keep up with her parenting responsibility and wants her daughter be responsible of her own behaviors. Patient expects her daughter to focus on her school and be successful academically. Patient also has been at [employer] and was told she can earn her GED on [employer]. She shared she was excited and happy to find out about his option.No  "other concerns were identified or expressed.Patient plans to come back in 2 weeks for more support.     Therapist impression of patients current state: This 32 y.o. Black or  female presented on time for her therapy session. It was noted that patient went to the bathroom several times during the session. Writer assessed her safety and whether she needed any support to see provider for her stomach issues.  Writer reviewed last visit with the patient about her daughter since their communication has been a source of stress. Writer offered empathy and validated her feelings. Writer also reinforced the patient's commitment to therapy and encouraged her to continue practicing coping strategies  learned today.Patient has gotten tough time related to her blindness, diabetes and kidney problems that required dialysis 3 times a week which appears the reasons she has high expectations from her teen daughter creating emotions deregulations. Writer encouraged the patient to follow through with the [employer].      Assessment tools used today include: How do you feel today?    Type of psychotherapeutic technique provided: Client centered and CBT, family dynamic.    Progress toward short term goals:\"less depressed today\"    Review of long term goals: Updated Treatment Plan: 2/24/2017 next review: 5/26/2017    Diagnosis:     1. Adjustment disorder with mixed anxiety and depressed mood    2. Major depressive disorder, recurrent episode, moderate     No change    Plan and Follow-up:   1. Patient plans to continue taking her medications as prescribed.  2. Patient will increase communication with daughter to resolve their differences.   3. Patient will see this therapist in 2 weeks.     Discharge Criteria/Planning: Client has chronic symptoms and ongoing therapy for maintenance stability recommended.    Performed and documented by BECKY Dozier- ANA; [facility] 4/21/2017    This note has been dictated " using voice recognition software. Any grammatical or context distortions are unintentional and inherent to the software.

## 2021-06-10 NOTE — PROGRESS NOTES
ASSESSMENT: Onychauxis, type 1 diabetes with neurologic manifestations, foot pain.    PLAN: Toenails were debrided manually and mechanically x10. Return to clinic in nine weeks.         SUBJECTIVE: Toenails are long, thick and painful. Last nail debridement in the clinic was January 23, 2017. She continues with dialysis. Vision is significantly impaired.    OBJECTIVE:  General: Pleasant 32 y.o. female in no acute distress.  Vascular: DP pulses are diminished. PT pulses are diminished. Pedal hair is absent. Feet are cool to the touch.  Neuro: Sensation in the feet is absent.  She describes neuropathy up to the knees.  Some altered sensation in the hands as well.  Derm: Toenails are thickened and dystrophic with discoloration and subungual debris. Plantar skin is xerotic, but intact.  Musculoskeletal: Adequate passive range of motion and foot and ankle joints.

## 2021-06-11 NOTE — PROGRESS NOTES
ASSESSMENT: Onychauxis, type 1 diabetes with neurologic manifestations, foot pain.    PLAN: Toenails were debrided manually and mechanically x10. Return to clinic in nine weeks.         SUBJECTIVE: Toenails are long, thick and painful. Last nail debridement in the clinic was April 10, 2017. She continues with dialysis. Vision is significantly impaired.    OBJECTIVE:  General: Pleasant 32 y.o. female in no acute distress.  Vascular: DP pulses are diminished. PT pulses are diminished. Pedal hair is absent. Feet are cool to the touch.  Neuro: Sensation in the feet is absent.  She describes neuropathy up to the knees.  Some altered sensation in the hands as well.  Derm: Toenails are thickened and dystrophic with discoloration and subungual debris. Plantar skin is xerotic, but intact.  Musculoskeletal: Adequate passive range of motion and foot and ankle joints.

## 2021-06-11 NOTE — PROGRESS NOTES
Outpatient Mental Health Treatment Plan    Name:  Sabra Leigh  :  1985  MRN:  [id number]    Treatment Plan:  Updated Treatment Plan  Intake/initial treatment plan date:  :  2015  Benefit and risks and alternatives have been discussed: Yes  Is this treatment appropriate with minimal intrusion/restrictions: Yes  Estimated duration of treatment:  10 +  Anticipated frequency of services:  Every 2 weeks  Necessity for frequency: This frequency is needed to establish therapeutic goals and for continuity of care in order to monitor progress.  Necessity for treatment: To address cognitive, behavioral, and/or emotional barriers in order to work toward goals and to improve quality of life.    Plan:     Depression:  Goal: Decrease average depression level from 3 to 2. -Same  Strategies:  ?[X] Decrease social isolation    [X] Increase involvement in meaningful activities    ?[X] Discuss sleep hygiene    ?[X] Explore thoughts and expectations about self and others    ?[X] Process grief (loss of significant person, independence, role, etc.)    ?[X] Assess for suicide risk    ?[ X] Implement physical activity routine (with physician approval)    [  ] Consider introduction of bibliotherapy and/or videos    [X] Continue compliance with medical treatment plan (or explore barriers)     Degree to which this is a problem (1-4): 3-no change  Degree to which goal is met (1-4): 3    Date of next  Review: 9/15/2017  ? Anxiety   Goal: Decrease average anxiety level from 3 to 2.  Same   Strategies:  ? [X]Learn and practice relaxation techniques and other coping strategies (e.g., thought stopping, reframing, meditation)    ? [X] Increase involvement in meaningful activities    ? [X] Discuss sleep hygiene    ? [X] Explore thoughts and expectations about self and others    ? [X ] Identify and monitor triggers for panic/anxiety symptoms    ? [X ] Implement physical activity routine (with physician approval)    ? [  ] Consider  "introduction of bibliotherapy and/or videos    ? [X] Continue compliance with medical treatment plan (or explore barriers)      Degree to which this is a problem (1-4): 3  Degree to which goal is met (1-4): 3  Date of next  Review: 9/15/2017  Functional Impairment: 1=Not at all/Rarely 2=Some days 3=Most Days 4=Every Day    Personal: 3  Family: 4  Social: 3    Work: 4- Unable to see this time      Diagnosis: Adjustment disorder with mixed anxiety and depressed mood       WHODAS 2.0 12-item version: 38  H1:30    H2: 30  H3: 30  In the last 30 days, patient's level of disability was at 79 % or severe.   Clinical assessments completed: PHQ-9=8; MAISHA-7=10;  WHODAS 2.0( 79 %); CAGE AID: 0/4.   Strengths: \"Doing hair, cooking with some assistance, very optimistic, and positive\"  Limitations: \"Unable to see much, to work as used to; mental health and medical issues.\"    Cultural Considerations:   female living with her 15 year old daughter. Gets a big support from her ex partner. Still experiencing visual, kidney, and diabetes problems; which affects her mental health. Mrs. Leigh is determined to work with every provider she can get to feel better. Her depression is getting better and her level of acceptance is better. Mrs Leigh wants to use a holistic approach in her recovery process by strengthing her sary in combination of the [facility]. Mrs. Leigh continues to have limited family and social support and relies mostly on the outside providers. Mrs. Leigh works with any provider regardless their race and ethnicity.       Persons responsible for this plan:  ? [x] Patient                     ? [x] Provider                   ? [x] Other: And all the care of the patient at [facility]     Provider:Performed and documented by FIOR Dozier; [facility] 6/16/2017    Date:  6/16/2017  Time:  4:47 PM        Patient Signature:____________________________________ Date: ______________     Guardian Signature: " __________________________________ Date: ______________     Therapist Signature: __________________________________ Date: ______________

## 2021-06-11 NOTE — PROGRESS NOTES
"  6/16/2017    Start time: 14:00  Stop Time: 14:53  Session # 37. Session 11 this year.     Sabra Leigh is a 32 y.o. female is being seen today for    Chief Complaint   Patient presents with      Follow Up     Anxiety     Depression     Follow up in regards to ongoing symptom management of anxiety and depression.     New symptoms or complaints: None    Functional Impairment:   Personal: 3  Family: 3  Social: 3  Work: 4    Clinical assessment of mental status:   Sabra Leigh presented on time.   She was oriented in time. Can't see and needs support but remembers places she has been at and time. She was cooperative and dressed appropriately for this session and weather. Her memory was Normal cognitive functioning .  Her speech was  Within normal.  Language was appropriate.  Concentration and focus is Within normal. Psychosis is not noted or reported. She reports her mood is Depressed.  Affect is congruent with speech and is Congruent w/content of speech and sad.  Fund of knowledge is adequate. Insight is adequate for therapy.    Suicidal/Homicidal Ideation present: Patient denies suicidal and homicidal ideations/means or plans.     Patient's impression of their current status: Patient presented to the clinic continue expressing her feelings and concerns for support and coping strategies. Report feeling disappointed and scared by her weight gain.  She said that she was  told that if she does not lose weight she is going to die.  Patient shares that she is worried about her daughter if she dies; shares she has no way to \" do it by myself and I would have done it if I could see\"  She is not sure of what can be done to help her access to a  that would be covered by her insurance. Has visited the [facility] in [place]; reports she is excited about their program but not sure if they can help with weight loss as well. Nevertheless, patient stated that she is trying to  focus on the positive;  " "wants to refrain from getting angry as a response to her disability.  She has made some progress with depression and anxiety considering her ongoing source of stress. However, her WHODAS 2.0 has gotten higher from 58 % to 79 %. Patient plans to come back in 2 weeks for more support.     Therapist impression of patients current state: This 32 y.o. Black or  female presented on time for her therapy session. She was being walked by her 15 year old daughter.  Patient appeared sad and more down than usual with appears to be the true feelings around her presenting issue. Rather, patient had been listening to the music to cope with being alone as her partner had to leave to come back later after the session.  Writer offered empathy and validated her feelings as presented today. Writer also reinforced the patient's commitment to therapy and encouraged her to continue practicing coping strategies  learned today.Patient has gotten tough time related to her blindness, diabetes and kidney problems that have led to weight gain due to lack of ability to move around as she wishes and some dificulties with proper diet. Writer encouraged the pt to discuss with other providers and [facility] the need for a  who would help with weight loss. Writer pointed the patient's progress from the last quarter.  Patient responded well to a message of empowerment and  hope.     Assessment tools used today include: PHQ-9=8; MAISHA-7=10;  WHODAS 2.0( 79 %); CAGE AID: 0/4; PANSI: 3.5/1 no SI/HI    Type of psychotherapeutic technique provided: Client centered and CBT    Progress toward short term goals:\"less depressed today\"    Review of long term goals: Updated Treatment Plan: 6/16/2017 next review: 9/15/2017    Diagnosis:     1. Adjustment disorder with mixed anxiety and depressed mood    2. Major depressive disorder, recurrent episode, moderate     No change    Plan and Follow-up:   .1. Patient plans to continue taking her medications " as prescribed.  2. Patient plans to spend sometime with her family this weekend  3. Patient plans to walk at the [facility] with her daughter this weekend  4 Patient will see this therapist in 2 weeks.    Discharge Criteria/Planning: Client has chronic symptoms and ongoing therapy for maintenance stability recommended.    Performed and documented by FIOR Dozier; [facility] 6/16/2017    This note has been dictated using voice recognition software. Any grammatical or context distortions are unintentional and inherent to the software.

## 2021-06-11 NOTE — PROGRESS NOTES
Programs applying for: Insurance  Case:  Financial worker:   MA Tracking:  Nov, Due Dec      7/10/2017: Calling the [facility]. Patient had been advised that she couldn't apply for herself on [employer] but was able to apply for her daughter. When I called Sabra today, she mentioned that someone called her asking her questions. I then pulled Sabra up on MN[employer]. Patient is showing as Active 6/1/2017 with MA. Unsure if patient is confused or something has changed.  Spoke with [facility] today. I was advised that both her and her child are active as of 6/1/2017.  Unsure what may have changed since she went to the [facility] for assistance. If patient is certified disabled, we will need to complete the certain population form.  Patient states she doesn't think she is certified disabled as far as she is aware. We will not complete a certain poplulation form today. Since patient is active, I will be canceling our appointment today. She will call her PCA services and advise them she is active to get her services started again. She will look for any paperwork in the mail to choose her PMAP insurance. ([employer]) Today, I provided her [employer] number= [phone]. No further action is needed at this time.

## 2021-06-11 NOTE — PROGRESS NOTES
6/2/2017    Start time: 14:00  Stop Time: 15:00   Session # 36. Session 10 this year.     Sabra Leigh is a 32 y.o. female is being seen today for    Chief Complaint   Patient presents with      Follow Up     Follow up in regards to ongoing symptom management of anxiety and depression.     New symptoms or complaints: None    Functional Impairment:   Personal: 3  Family: 3  Social: 3  Work: 4    Clinical assessment of mental status:   Sabra Leigh presented on time.   She was oriented in time. Can't see and needs support but remembers places she has been at and time. She was cooperative and dressed appropriately for this session and weather. Her memory was Normal cognitive functioning .  Her speech was  Within normal.  Language was appropriate.  Concentration and focus is Within normal. Psychosis is not noted or reported. She reports her mood is Depressed.  Affect is congruent with speech and is Congruent w/content of speech and sad.  Fund of knowledge is adequate. Insight is adequate for therapy.    Suicidal/Homicidal Ideation present: Patient denies suicidal and homicidal ideations/means or plans.     Patient's impression of their current status: Patient presented to the clinic continue expressing her feelings and concerns for support and coping strategies. Report no new symptoms of depression or anxiety; shares she is now in good terms with her ex partner who has been in her life for 11 years helping her in many ways even after breaking up intimately.  Ex partner came in with the patient to help her walk inside the building. Left eye has been covered for the last 2 weeks after some procedures. Pt has hope to see one day after the procedure is completed.  She continues to find eda in little things she does with the family; remains positive and continues to listen to Es Rangel's message of hope . Patient continues to focus on the positive;  wants to refrain from getting angry as a response to her  "disability. Wants to stay away from \"family drama\"; sister has been in unhealthy relationship which patient indicated that was affecting her as she tries to help her sister with some of her basic needs.Patient plans to come back in 2 weeks for more support.     Therapist impression of patients current state: This 32 y.o. Black or  female presented on time for her therapy session.  Patient appeared happier today and for the first time, she was found awake at the waiting area, at the time of her session.  Rather, patient had been listening to the music to cope with being alone as her partner had to leave to come back later after the session.  Writer offered empathy and validated her feelings as presented today. Writer also reinforced the patient's commitment to therapy and encouraged her to continue practicing coping strategies  learned today. Took the patient to walk in the building for 10 minutes which she was appreciative for. Patient has gotten tough time related to her blindness, diabetes and kidney problems that required dialysis 3 times a week which she appears and she was appreciative about today's intervention. Patient responded well to a message of empowerment and  hope.     Assessment tools used today include: How do you feel today?    Type of psychotherapeutic technique provided: Client centered and CBT    Progress toward short term goals:\"less depressed today\"    Review of long term goals: Updated Treatment Plan: 2/24/2017 next review: 5/26/2017; review will be completed at the next session.     Diagnosis:     1. Adjustment disorder with mixed anxiety and depressed mood    2. Major depressive disorder, recurrent episode, moderate     No change    Plan and Follow-up:   1. Patient plans to continue taking her medications as prescribed.  2. Patient plans to spend sometime with her family this weekend  3. Patient will see this therapist in 2 weeks.    Discharge Criteria/Planning: Client has " chronic symptoms and ongoing therapy for maintenance stability recommended.    Performed and documented by BECKY Dozier- ANA; [facility] 6/2/2017    This note has been dictated using voice recognition software. Any grammatical or context distortions are unintentional and inherent to the software.

## 2021-06-11 NOTE — PROGRESS NOTES
Pt indicated she no longer has state insurance. Has applied on [employer], was advised she couldn't apply online but her daughter could.  Pt will meet with Brandy on Mon, July 10 to complete Certain Population Application.  She will be starting [employer] classes.  They'll be on Fridays, unknown start time; they'll be about 8 hours long.  After classes, she will move on to get her GED.

## 2021-06-11 NOTE — PROGRESS NOTES
"[facility]  ENDOCRINOLOGY    Diabetes Note 7/12/2017    Sabra Leigh, 1985, [id number]          Reason for visit      1. DM type 1 (diabetes mellitus, type 1)    2. 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 old female who presents for follow up.  SUMMARY:  Sully returns today in f/u for Type 1 DM.  Her A1c is back up again to 10.1 from 9.  She tells me today that all of her sisters now have diabetes.  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  One of these sisters has been at her house for about a month and it has been more stressful than usual for her.  It may be contributory to the change in her A1c, but likely it has more to do with Sully being Sully.  She continues to use the insulin pump for her insulin management, but because she is not using the Juan Contour meter, which automatically inputs her blood sugar into the pump, she misses out on her correction insulin.  She has brought her Prodigy meter with her today, which tells her the BG.  At the most, she is testing twice a day, usually once a day. Her blood sugars range from 47 to 512.  Her eating habits are not known to be stellar and she tells me that she has been eating \"better.\"  She has been eating at home more, but that doesn't always mean that she is making better choices.  She is also waiting too long before she eats - she often doesn't eat before she is done with dialysis and that is about 2 in the afternoon.  She will go from the night before to mid afternoon without eating.  We have discussed this from the beginning of time and I can't seem to get her to connect with this.    She has had another eye treatment done and her eyes look much better today.  She states that the vision in her L eye has improved somewhat.    She only has basal settings in her pump as she hand counts (the beeps) to give herself a bolus.    Insulin Pump Settings   "   Basal Rate  TIME Units/HR   0000 - 0600 0.70   0600 - 1230 0.750   1230 - 1500 0.850   1500 - 2100 0.700   2100 - 0000 0.650                 Past Medical History     Patient Active Problem List   Diagnosis     Hypertension     Obstructive Sleep Apnea - does not use CPAP - she says she was told she needed it, but that it was not prescribed.     Anemia in CKD (chronic kidney disease)     GERD (gastroesophageal reflux disease)     Diabetic peripheral neuropathy associated with type 1 diabetes mellitus     DM type 1 (diabetes mellitus, type 1)     ESRD (end stage renal disease) on dialysis     RLS (restless legs syndrome) - ropinorole, 0.25 mg at hs at hospital discharge - November, 2015     Retinal detachment     Gastroparesis     SHANNON - shortness of breath with exertion that is limiting.     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     Cerebral atrophy     Sebaceous cyst of ear     Morbid obesity with BMI of 40.0-44.9, adult     Lipoma of abdominal wall     Chest pain     Stroke - states that her whole left side of her body was numb - this was at [facility] [place] (remote)     Cataract     Chronic diarrhea     CTS (carpal tunnel syndrome) - both sides - **possible** - NCV does not clearly show this (February, 2017)     Breast mass, left breast - no evidence of breast mass on clinical examination of 4/7/17        Family History       family history includes Aneurysm in her mother; Asthma in her brother, daughter, sister, and sister; Depression in her mother; Diabetes in her mother and sister; Hypertension in her mother; Sleep apnea in her brother, mother, sister, and sister; Snoring in her brother, mother, sister, sister, and sister; Stroke in her mother.    Social History      reports that she quit smoking about 19 years ago. Her smoking use included Cigarettes. She has a 0.25 pack-year smoking history. She has never used smokeless tobacco. She reports that she does not drink alcohol or use  "illicit drugs.      Review of Systems     Patient has no polyuria or polydipsia, no chest pain, dyspnea or TIA's, no numbness, tingling or pain in extremities  Remainder negative except as noted in HPI.    Vital Signs     /68 (Patient Site: Right Arm, Patient Position: Sitting, Cuff Size: Adult Large)  Pulse 78  Ht 4' 11\" (1.499 m)  Wt 216 lb 9.6 oz (98.2 kg)  BMI 43.75 kg/m2  Wt Readings from Last 3 Encounters:   07/06/17 216 lb 9.6 oz (98.2 kg)   06/23/17 217 lb (98.4 kg)   04/25/17 218 lb (98.9 kg)       Physical Exam     Constitutional:  Well developed, Well nourished  HENT:  Normocephalic,   Neck: Thyroid normal, No lymph nodes, Supple  Eyes:  PERRL, Conjunctiva pink  Respiratory:  Normal breath sounds, No respiratory distress  Cardiovascular:  Normal heart rate, Normal rhythm, No murmurs  GI:  Bowel sounds normal, Soft, No tenderness  Musculoskeletal:  No gross deformity or lesions, normal dorsalis pedis pulses  Skin: No acanthosis nigricans, lipoatrophy or lipodystrophy  Neurologic:  Alert & oriented x 3, nonfocal  Psychiatric:  Affect, Mood, Insight appropriate  Diabetic foot exam: not done today      Assessment     1. DM type 1 (diabetes mellitus, type 1)    2. 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  I have also asked her, again, to try and use her Juan Contour again to get the data into the pump. I always encourage her to keep moving and we discussed this today - she has an exercise bike as well as the ability to walk regularly. I told her to get out there with her sister as they both need the movement.  F/u in 6 weeks.  Time spent with pt today: 25 min with >50% spent in counseling and coordination of care.        Misti WILCOX Endocrinology  7/12/2017  7:20 AM        Lab Results     Hemoglobin A1c   Date Value Ref Range Status   06/23/2017 10.0 (H) 3.5 - 6.0 % Final   02/03/2017 9.1 (H) 3.5 - " 6.0 % Final   10/15/2016 8.7 (H) 4.2 - 6.1 % Final   08/08/2016 9.9 (H) 3.5 - 6.0 % Final   04/27/2016 8.8 (H) 3.5 - 6.0 % Final     Creatinine   Date Value Ref Range Status   06/23/2017 9.80 (HH) 0.60 - 1.10 mg/dL Final   10/17/2016 10.08 (HH) 0.60 - 1.10 mg/dL Final   10/16/2016 8.03 (HH) 0.60 - 1.10 mg/dL Final     Microalbumin, Random Urine   Date Value Ref Range Status   07/09/2015 499.19 (H) 0.00 - 1.99 mg/dL Final       Cholesterol   Date Value Ref Range Status   04/27/2016 190 <=199 mg/dL Final     HDL Cholesterol   Date Value Ref Range Status   04/27/2016 30 (L) >=50 mg/dL Final     LDL Calculated   Date Value Ref Range Status   04/27/2016  <=129 mg/dL Final     Comment:     Invalid, Triglycerides >400     Triglycerides   Date Value Ref Range Status   04/27/2016 444 (H) <=149 mg/dL Final       Lab Results   Component Value Date    ALT 31 10/14/2016    AST 28 10/14/2016    ALKPHOS 281 (H) 10/14/2016    BILITOT 0.4 10/14/2016         Current Medications     Outpatient Medications Prior to Visit   Medication Sig Dispense Refill     acetaminophen (TYLENOL) 325 MG tablet Take 650 mg by mouth every 6 (six) hours as needed for pain.       acetone, urine, test (ACETONE, URINE, TEST) Strp Check urine for ketones if blood sugar is above 250.  Call Dr Soliz if ketones are moderate or large. 50 strip 3     alcohol swabs (ALCOHOL PADS) PadM Apply 1 Units topically daily. 70% 100 each 3     amLODIPine (NORVASC) 10 MG tablet Take 1 tablet (10 mg total) by mouth daily. 90 tablet 3     aspirin 81 MG EC tablet Take 81 mg by mouth daily.       blood glucose meter (GLUCOMETER) Use 1 each As Directed as needed. Dispense prodigy talking glucose meter 1 each 0     blood glucose test (CONTOUR NEXT STRIPS) strips Test 6 times daily 200 each 11     blood glucose test strips One each 4 times per day. Dispense prodigy strips for talking meter 100 each 11     calcium acetate (PHOSLO) 667 mg capsule Take 1 capsule (667 mg total) by mouth  "3 (three) times a day. (Patient taking differently: Take 1 capsule by mouth 2 (two) times a day. Indications: Renal Osteodystrophy with Hyperphosphatemia) 90 capsule 11     cane Jie Use as directed 1 each 0     cholecalciferol, vitamin D3, 1,000 unit tablet Take 1 tablet (1,000 Units total) by mouth daily. 90 tablet 2     CLASSIC PRENATAL 28 mg iron- 800 mcg Tab Take 1 tablet by mouth daily. 90 tablet 3     erythromycin ophthalmic ointment Administer 1 application to the right eye 4 (four) times a day.       gabapentin (NEURONTIN) 300 MG capsule Take 1 3-4 time a day for diabetic neuropathy pain. 120 capsule 5     glucagon, human recombinant, (GLUCAGON) 1 mg injection Use as directed for hypoglycemia (Patient taking differently: Inject 1 each under the skin. Use as directed for hypoglycemia) 1 each 3     glucose (DEX4 GLUCOSE) 4 GM chewable tablet Chew 16 g as needed for low blood sugar.       insulin aspart (NOVOLOG FLEXPEN) 100 unit/mL injection pen Inject under the skin. Use in insulin pump up to 80 units daily       insulin lispro (HUMALOG KWIKPEN) 100 unit/mL pen Inject 1-12 Units, 3 times daily with meals (Patient taking differently: For use if insulin pump fails) 5 adj dose pen 0     insulin needles, disposable, (BD INSULIN PEN NEEDLE UF SHORT) 31 X 5/16 \" Ndle Use As Directed 3 (three) times a day.       insulin pump cartridge Crtg 1 each Inject under the skin continuous. novolog insulin       insulin pump cartridge Inject under the skin continuous. Insulin pump discharge instructions from 10/15/16.  This is intended as additional information to the patient's PTA insulin pump order.  Continue with insulin pump at present settings. 1 each 0     insulin syringe-needle U-100 (BD INSULIN SYRINGE ULTRA-FINE) 0.3 mL 31 x 5/16\" Syrg Test 3 times daily 100 each 3     LANCING DEVICE MISC Use As Directed.       LANTUS SOLOSTAR 100 unit/mL (3 mL) pen Inject 8 units BID if pump fails (Patient taking differently: Inject " "12 Units under the skin as needed (Only used when pump was not working). ) 15 mL PRN     losartan-hydrochlorothiazide (HYZAAR) 50-12.5 mg per tablet Take 1 tablet by mouth daily. 90 tablet 0     LUBRICATING PLUS 0.5 % Dpet ophthalmic dropperette   6     metoclopramide (REGLAN) 5 MG tablet TAKE ONE TABLET BY MOUTH THREE TIMES A DAY BEFORE MEALS  90 tablet 0     metoprolol tartrate (LOPRESSOR) 50 MG tablet TAKE 1 TABLET (50 MG TOTAL) BY MOUTH 2 (TWO) TIMES A DAY. 60 tablet 11     omeprazole (PRILOSEC) 20 MG capsule TAKE ONE CAPSULE BY MOUTH ONE TIME DAILY 30 minutes before a meal to prevent heartburn 30 capsule 11     prenatal vit-iron fumarate-FA 28 mg iron- 800 mcg Tab Take 1 tablet by mouth daily. 90 tablet 0     rOPINIRole (REQUIP) 0.25 MG tablet Take 0.25 mg by mouth bedtime.       transparent dressings 2 3/8 X 2 3/4 \" Bndg Apply 1 patch topically every third day. 6 each 11     NOVOLOG 100 unit/mL injection   5     No facility-administered medications prior to visit.            "

## 2021-06-12 NOTE — PROGRESS NOTES
ASSESSMENT: Onychauxis, type 1 diabetes with neurologic manifestations, foot pain.    PLAN: Toenails were debrided manually and mechanically x10. Return to clinic in nine weeks.         SUBJECTIVE: Toenails are long, thick and painful. Last nail debridement in the clinic was June 26, 2017. She continues with dialysis. Vision is significantly impaired.    OBJECTIVE:  General: Pleasant 32 y.o. female in no acute distress.  Vascular: DP pulses are diminished. PT pulses are diminished. Pedal hair is absent. Feet are cool to the touch.  Neuro: Sensation in the feet is absent.  She describes neuropathy up to the knees.  Some altered sensation in the hands as well.  Derm: Toenails are thickened and dystrophic with discoloration and subungual debris. Plantar skin is xerotic, but intact.  Musculoskeletal: Adequate passive range of motion and foot and ankle joints.

## 2021-06-12 NOTE — PROGRESS NOTES
EFFIE scheduled on 10/4 with Christel. Portion control seems to be a concern based on the call today.  She is still in classes to learn brail and cooking.  She is hoping to start working out at the school before class. Maybe 1 or 2 times a week.   Her daughter had her first day of school today (10th grade). Sully states, she wasn't home today to see her off to school because [place].

## 2021-06-12 NOTE — PROGRESS NOTES
"[facility]  ENDOCRINOLOGY    Diabetes Note 8/20/2017    Sabra Leigh, 1985, [id number]          Reason for visit      1. DM type 1 (diabetes mellitus, type 1)    2. Diarrhea, unspecified type        HPI     Sabra Leigh is a very pleasant 32 y.o. old female who presents for follow up.  SUMMARY:  Sully returns today with her daughter in f/u for DM 1.  She reports to me today that she is wearing a Depends undergarment because she has been having uncontrolled diarrhea.  She reports that this has been going on for a couple of months.  She did not mention this to me directly at either of her last two appointments.  She is excited to tell me that she is going to be learning Braille and to use an impaired eyesight cane.  She will also be instructed in how to cook for herself safely. She states that she is trying to eat more at home, but I know that for her and her family, it is always easier for them to get take out.  She states that veggies give her diarrhea.  She states that she is trying not to eat after 6 PM.  Her A1c is the highest that it has been for the last 9 months.  Her Meter shows that she is testing 1.7 times daily.  She cannot input her blood sugars to her pump, so she does things in her head and counts the \"beeps\" on her insulin pump.  Is this optimal?  HEAVENS NO!  Is this giving her the most autonomy?  Yes. She has a HHA help her with her sets.        Past Medical History     Patient Active Problem List   Diagnosis     Hypertension     Obstructive Sleep Apnea - does not use CPAP - she says she was told she needed it, but that it was not prescribed.     Anemia in CKD (chronic kidney disease)     GERD (gastroesophageal reflux disease)     Diabetic peripheral neuropathy associated with type 1 diabetes mellitus     DM type 1 (diabetes mellitus, type 1)     ESRD (end stage renal disease) on dialysis     RLS (restless legs syndrome) - ropinorole, 0.25 mg at hs at hospital discharge - November, 2015 " "    Retinal detachment     Gastroparesis     SHANNON - shortness of breath with exertion that is limiting.     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     Cerebral atrophy     Sebaceous cyst of ear     Morbid obesity with BMI of 40.0-44.9, adult     Lipoma of abdominal wall     Chest pain     Stroke - states that her whole left side of her body was numb - this was at [facility] [place] (remote)     Cataract     Chronic diarrhea     CTS (carpal tunnel syndrome) - both sides - **possible** - NCV does not clearly show this (February, 2017)     Breast mass, left breast - no evidence of breast mass on clinical examination of 4/7/17     Diarrhea of presumed infectious origin        Family History       family history includes Aneurysm in her mother; Asthma in her brother, daughter, sister, and sister; Depression in her mother; Diabetes in her mother and sister; Hypertension in her mother; Sleep apnea in her brother, mother, sister, and sister; Snoring in her brother, mother, sister, sister, and sister; Stroke in her mother.    Social History      reports that she quit smoking about 19 years ago. Her smoking use included Cigarettes. She has a 0.25 pack-year smoking history. She has never used smokeless tobacco. She reports that she does not drink alcohol or use illicit drugs.      Review of Systems     Patient has no polyuria or polydipsia, no chest pain, dyspnea or TIA's, no numbness, tingling or pain in extremities  Remainder negative except as noted in HPI.    Vital Signs     /80 (Patient Site: Left Arm, Patient Position: Sitting, Cuff Size: Adult Large)  Pulse 78  Ht 4' 11\" (1.499 m)  Wt (!) 222 lb 6.4 oz (100.9 kg)  BMI 44.92 kg/m2  Wt Readings from Last 3 Encounters:   08/15/17 (!) 222 lb 6.4 oz (100.9 kg)   07/06/17 216 lb 9.6 oz (98.2 kg)   06/23/17 217 lb (98.4 kg)       Physical Exam     Constitutional:  Well developed, Well nourished  HENT:  Normocephalic,   Neck: Thyroid normal, No " lymph nodes, Supple  Eyes:  PERRL, Conjunctiva pink  Respiratory:  Normal breath sounds, No respiratory distress  Cardiovascular:  Normal heart rate, Normal rhythm, No murmurs  GI:  Bowel sounds hyperactive, Soft, No tenderness      Assessment     1. DM type 1 (diabetes mellitus, type 1)    2. Diarrhea, unspecified type        Plan       I am hoping that her vision impairment training will enable her to do a better job with life management.  I seems to me that most of my suggestions go by the wayside because of her living situation.  If she does eat at home, her 16 yr old daughter often gets stuck with the responsibility.  She is a child, and thus needs to be a child, but when the responsibility of her mother's care falls on her, she gets pushed into a difficult situation.  Her mother's poor choices regarding her healthcare should not rest upon her to take care of the outcome.  I continue to support as I can so do.     I have prescribed some Lomotil to see if we can get her diarrhea slowed down.  We will see.    Time spent with pt today: 25 min with >50% spent in counseling and coordination of care.        Misti Chapa   Endocrinology  8/20/2017  11:56 AM      Lab Results     Hemoglobin A1c   Date Value Ref Range Status   06/23/2017 10.0 (H) 3.5 - 6.0 % Final   02/03/2017 9.1 (H) 3.5 - 6.0 % Final   10/15/2016 8.7 (H) 4.2 - 6.1 % Final   08/08/2016 9.9 (H) 3.5 - 6.0 % Final   04/27/2016 8.8 (H) 3.5 - 6.0 % Final     Creatinine   Date Value Ref Range Status   06/23/2017 9.80 (HH) 0.60 - 1.10 mg/dL Final   10/17/2016 10.08 (HH) 0.60 - 1.10 mg/dL Final   10/16/2016 8.03 (HH) 0.60 - 1.10 mg/dL Final     Microalbumin, Random Urine   Date Value Ref Range Status   07/09/2015 499.19 (H) 0.00 - 1.99 mg/dL Final       Cholesterol   Date Value Ref Range Status   04/27/2016 190 <=199 mg/dL Final     HDL Cholesterol   Date Value Ref Range Status   04/27/2016 30 (L) >=50 mg/dL Final     LDL Calculated   Date Value Ref Range  Status   04/27/2016  <=129 mg/dL Final     Comment:     Invalid, Triglycerides >400     Triglycerides   Date Value Ref Range Status   04/27/2016 444 (H) <=149 mg/dL Final       Lab Results   Component Value Date    ALT 31 10/14/2016    AST 28 10/14/2016    ALKPHOS 281 (H) 10/14/2016    BILITOT 0.4 10/14/2016         Current Medications     Outpatient Medications Prior to Visit   Medication Sig Dispense Refill     acetaminophen (TYLENOL) 325 MG tablet Take 650 mg by mouth every 6 (six) hours as needed for pain.       acetone, urine, test (ACETONE, URINE, TEST) Strp Check urine for ketones if blood sugar is above 250.  Call Dr Soliz if ketones are moderate or large. 50 strip 3     alcohol swabs (ALCOHOL PADS) PadM Apply 1 Units topically daily. 70% 100 each 3     amLODIPine (NORVASC) 10 MG tablet Take 1 tablet (10 mg total) by mouth daily. 90 tablet 3     aspirin 81 MG EC tablet Take 81 mg by mouth daily.       blood glucose meter (GLUCOMETER) Use 1 each As Directed as needed. Dispense prodigy talking glucose meter 1 each 0     blood glucose test strips One each 4 times per day. Dispense prodigy strips for talking meter 100 each 11     calcium acetate (PHOSLO) 667 mg capsule Take 1 capsule (667 mg total) by mouth 3 (three) times a day. (Patient taking differently: Take 1 capsule by mouth 2 (two) times a day. Indications: Renal Osteodystrophy with Hyperphosphatemia) 90 capsule 11     cholecalciferol, vitamin D3, 1,000 unit tablet Take 1 tablet (1,000 Units total) by mouth daily. 90 tablet 2     CLASSIC PRENATAL 28 mg iron- 800 mcg Tab Take 1 tablet by mouth daily. 90 tablet 3     erythromycin ophthalmic ointment Administer 1 application to the right eye 4 (four) times a day.       gabapentin (NEURONTIN) 300 MG capsule Take 1 3-4 time a day for diabetic neuropathy pain. 120 capsule 5     glucagon, human recombinant, (GLUCAGON) 1 mg injection Use as directed for hypoglycemia (Patient taking differently: Inject 1 each  "under the skin. Use as directed for hypoglycemia) 1 each 3     glucose (DEX4 GLUCOSE) 4 GM chewable tablet Chew 16 g as needed for low blood sugar.       insulin aspart (NOVOLOG FLEXPEN) 100 unit/mL injection pen Inject under the skin. Use in insulin pump up to 80 units daily       insulin lispro (HUMALOG KWIKPEN) 100 unit/mL pen Inject 1-12 Units, 3 times daily with meals (Patient taking differently: For use if insulin pump fails) 5 adj dose pen 0     insulin needles, disposable, (BD INSULIN PEN NEEDLE UF SHORT) 31 X 5/16 \" Ndle Use As Directed 3 (three) times a day.       insulin pump cartridge Crtg 1 each Inject under the skin continuous. novolog insulin       insulin pump cartridge Inject under the skin continuous. Insulin pump discharge instructions from 10/15/16.  This is intended as additional information to the patient's PTA insulin pump order.  Continue with insulin pump at present settings. 1 each 0     insulin syringe-needle U-100 (BD INSULIN SYRINGE ULTRA-FINE) 0.3 mL 31 x 5/16\" Syrg Test 3 times daily 100 each 3     LANCING DEVICE MISC Use As Directed.       LANTUS SOLOSTAR 100 unit/mL (3 mL) pen Inject 8 units BID if pump fails (Patient taking differently: Inject 12 Units under the skin as needed (Only used when pump was not working). ) 15 mL PRN     losartan-hydrochlorothiazide (HYZAAR) 50-12.5 mg per tablet Take 1 tablet by mouth every day 90 tablet 2     LUBRICATING PLUS 0.5 % Dpet ophthalmic dropperette   6     metoclopramide (REGLAN) 5 MG tablet TAKE ONE TABLET BY MOUTH THREE TIMES A DAY BEFORE MEALS  90 tablet 0     metoprolol tartrate (LOPRESSOR) 50 MG tablet TAKE 1 TABLET (50 MG TOTAL) BY MOUTH 2 (TWO) TIMES A DAY. 60 tablet 11     omeprazole (PRILOSEC) 20 MG capsule TAKE ONE CAPSULE BY MOUTH ONE TIME DAILY 30 minutes before a meal to prevent heartburn 30 capsule 11     prenatal vit-iron fumarate-FA 28 mg iron- 800 mcg Tab Take 1 tablet by mouth daily. 90 tablet 0     rOPINIRole (REQUIP) 0.25 " "MG tablet Take 0.25 mg by mouth bedtime.       transparent dressings 2 3/8 X 2 3/4 \" Bndg Apply 1 patch topically every third day. 6 each 11     blood glucose test (CONTOUR NEXT STRIPS) strips Test 6 times daily 200 each 11     cane Jie Use as directed 1 each 0     No facility-administered medications prior to visit.            "
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
## 2021-06-12 NOTE — PROGRESS NOTES
"  7/21/2017    Start time: 14:00  Stop Time: 14:53  Session # 38. Session 12 this year.     Sabra Leigh is a 32 y.o. female is being seen today for    Chief Complaint   Patient presents with      Follow Up     Depression     Follow up in regards to ongoing symptom management of anxiety and depression.     New symptoms or complaints: \" stressed\"    Functional Impairment:   Personal: 3.Family: 3  Social: 3  Work: 4    Clinical assessment of mental status:   Sabra Leigh presented on time.   She was oriented in time. Can't see and needs support but remembers places she has been at and time. She was cooperative and dressed appropriately for this session and weather. Her memory was Normal cognitive functioning .  Her speech was  Within normal.  Language was appropriate.  Concentration and focus is Brief. Psychosis is not noted or reported. She reports her mood is Irritable and Depressed.  Affect is congruent with speech and is Congruent w/content of speech and sad.  Fund of knowledge is adequate. Insight is adequate for therapy.    Suicidal/Homicidal Ideation present: Patient denies suicidal and homicidal ideations/means or plans.     Patient's impression of their current status: Patient presented to the clinic continue expressing her feelings and concerns for support and coping strategies. Reports feeling stressed out by family illness and unhealthy relationship in the family. Reports she still fears about her weight gain and inability to loose any on her own. Though states someone from the diabetes team has been visiting her. She is also waiting to hear from the service for [facility] to start fitness with them. She is agreeable to to follow up on her phone calls about the starting day for [facility] programs.  Patient plans to come back in 2 weeks for more support.     Therapist impression of patients current state: This 32 y.o. Black or  female presented on time for her therapy session. She was being " "walked by her 15 year old daughter.  Patient appeared sad and more down than usual which also she has stated was noted by other providers. She also was irritable and easily distracted. Had many prompts to stay on the topic. Appeared to be avoidant to discuss her presenting issues; sometimes minimizing the problems.  Writer made attempts to process patient's presenting issues about family and stress from her own health. Patient has gotten tough time related to her blindness, diabetes and kidney problems which understandably has changed her way of approaching certain issues. She responds well with active listening,offereding empathy and validation of her feelings. Writer encouraged the pt to follow up with the [facility] regarding her request for a  who would help with weight loss.  Patient responded well to a message of empowerment and  hope.     Assessment tools used today include: How do you feel today?    Type of psychotherapeutic technique provided: Client centered and CBT    Progress toward short term goals:\"depression is back\"    Review of long term goals: Updated Treatment Plan: 6/16/2017 next review: 9/15/2017    Diagnosis:     1. Adjustment disorder with mixed anxiety and depressed mood    2. Major depressive disorder, recurrent episode, moderate     No change    Plan and Follow-up:   1. Patient plans to continue taking her medications as prescribed.  2. Patient plans to spend sometime with her family this weekend  3. Patient plans to take a walk with her daughter several times a week  4 Patient will see this therapist in 2 weeks.    Discharge Criteria/Planning: Client has chronic symptoms and ongoing therapy for maintenance stability recommended.    Performed and documented by FIOR Dozier; [facility] 7/21/2017    This note has been dictated using voice recognition software. Any grammatical or context distortions are unintentional and inherent to the software.        "

## 2021-06-13 NOTE — PROGRESS NOTES
[facility]  ENDOCRINOLOGY    Diabetes Note 9/21/2017    Sabra Leigh, 1985, [id number]          Reason for visit      1. Type 1 diabetes mellitus with complication    2. Diabetic peripheral neuropathy associated with type 1 diabetes mellitus    3. 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 old female who presents for follow up.  SUMMARY:  Sully returns today in f/u for DM 1 with complication.  She continues on an insulin pump that is helping her stay independent with her insulin administration.  Her significant retinopathy severely impairs her ability to see.  She continues to have high drama at home and tries awfully hard to parent her daughter, but her disabilities really hamper her efforts.  She was recently hospitalized for chronic diarrhea.  It was found that she didn't have C-diff, but she is highly lactose intolerant.  She will see the Specialist next week.  She has brought in her Prodigy meter, and her numbers from the last week range between 134 and 465.  She is not known for making the best choices in her diet, and often eats fast food because it is just easier. Her current A1c is up to 10.  I can't make any significant changes to her pump because I can't get a download because she doesn't put any blood sugars into it.  Her blood sugars fluctuate wildly and a good portion of this is 2/2 her poor food choices.  When she really pays attention, and exercises regularly, this improves.     She has a sore in the crease behind her R small toe.  It looks clean and uninfected at present. Apparently she did have a healing area there, but she picked the scab off of it.  She has NO feeling in her feet.    Her eyes look better today.  She is learning braille at a local center for the vision impaired.  They are also teaching her how to cook and be safe.         Blood glucose data:      Past Medical History     Patient Active Problem List   Diagnosis      Hypertension     Obstructive Sleep Apnea - does not use CPAP - she says she was told she needed it, but that it was not prescribed.     Anemia in CKD (chronic kidney disease)     GERD (gastroesophageal reflux disease)     Diabetic peripheral neuropathy associated with type 1 diabetes mellitus     Type 1 diabetes mellitus with complication     ESRD (end stage renal disease) on dialysis     RLS (restless legs syndrome) - ropinorole, 0.25 mg at hs at hospital discharge - November, 2015     Retinal detachment     Gastroparesis     SHANNON - shortness of breath with exertion that is limiting.     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     Cerebral atrophy     Sebaceous cyst of ear     Morbid obesity with BMI of 40.0-44.9, adult     Lipoma of abdominal wall     Chest pain     Stroke - states that her whole left side of her body was numb [place] [facility] [place]     Cataract     Chronic diarrhea     CTS (carpal tunnel syndrome) - both sides - **possible** - NCV does not clearly show this (February, 2017)     Breast mass, left breast - no evidence of breast mass on clinical examination of 4/7/17     Diarrhea of presumed infectious origin     Diarrhea     Gastroenteritis     Secondary hypertension     Lower abdominal pain     End stage renal disease     Anemia, unspecified type        Family History       family history includes Aneurysm in her mother; Asthma in her brother, daughter, sister, and sister; Depression in her mother; Diabetes in her mother and sister; Hypertension in her mother; Sleep apnea in her brother, mother, sister, and sister; Snoring in her brother, mother, sister, sister, and sister; Stroke in her mother.    Social History      reports that she quit smoking about 19 years ago. Her smoking use included Cigarettes. She has a 0.25 pack-year smoking history. She has never used smokeless tobacco. She reports that she does not drink alcohol or use illicit  "drugs.      Review of Systems     Patient has no polyuria or polydipsia, no chest pain, dyspnea or TIA's, no numbness, tingling or pain in extremities  Remainder negative except as noted in HPI.    Vital Signs     /72  Ht 4' 11\" (1.499 m)  Wt 219 lb (99.3 kg)  BMI 44.23 kg/m2  Wt Readings from Last 3 Encounters:   09/21/17 219 lb (99.3 kg)   09/05/17 219 lb 9.6 oz (99.6 kg)   08/31/17 (!) 222 lb 3.2 oz (100.8 kg)       Physical Exam     Constitutional:  Well developed, Well nourished  HENT:  Normocephalic,   Neck: Thyroid normal, No lymph nodes, Supple  Eyes:  PERRL, Conjunctiva pink  Respiratory:  Normal breath sounds, No respiratory distress  Cardiovascular:  Normal heart rate, Normal rhythm, No murmurs  GI:  Bowel sounds normal, Soft, No tenderness  Musculoskeletal:  No gross deformity or lesions, normal dorsalis pedis pulses  Skin: No acanthosis nigricans, lipoatrophy or lipodystrophy  Neurologic:  Alert & oriented x 3, nonfocal  Psychiatric:  Affect, Mood, Insight appropriate  Diabetic foot exam: no monofilament sensation at all.  2 mm open area behind R small toe.  No sux infection.    Assessment     1. Type 1 diabetes mellitus with complication    2. Diabetic peripheral neuropathy associated with type 1 diabetes mellitus    3. 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  I have encouraged her to have someone keep an eye on her toe.  I am very proud of her new skills and know that she will rise to the occasion of cooking and reading as a vision impaired person.  Time spent with pt today: 25 min with >50% spent in counseling and coordination of care.          Misti WILCOX Endocrinology  9/21/2017  5:21 PM          Lab Results     Hemoglobin A1c   Date Value Ref Range Status   06/23/2017 10.0 (H) 3.5 - 6.0 % Final   02/03/2017 9.1 (H) 3.5 - 6.0 % Final   10/15/2016 8.7 (H) 4.2 - 6.1 % Final "   08/08/2016 9.9 (H) 3.5 - 6.0 % Final   04/27/2016 8.8 (H) 3.5 - 6.0 % Final     Creatinine   Date Value Ref Range Status   09/01/2017 14.58 (HH) 0.60 - 1.10 mg/dL Final   08/30/2017 10.84 (HH) 0.60 - 1.10 mg/dL Final   06/23/2017 9.80 (HH) 0.60 - 1.10 mg/dL Final     Microalbumin, Random Urine   Date Value Ref Range Status   07/09/2015 499.19 (H) 0.00 - 1.99 mg/dL Final       Cholesterol   Date Value Ref Range Status   04/27/2016 190 <=199 mg/dL Final     HDL Cholesterol   Date Value Ref Range Status   04/27/2016 30 (L) >=50 mg/dL Final     LDL Calculated   Date Value Ref Range Status   04/27/2016  <=129 mg/dL Final     Comment:     Invalid, Triglycerides >400     Triglycerides   Date Value Ref Range Status   04/27/2016 444 (H) <=149 mg/dL Final       Lab Results   Component Value Date    ALT 23 09/01/2017    AST 30 09/01/2017    ALKPHOS 269 (H) 09/01/2017    BILITOT 0.4 09/01/2017         Current Medications     Outpatient Medications Prior to Visit   Medication Sig Dispense Refill     acetaminophen (TYLENOL) 325 MG tablet Take 650 mg by mouth every 6 (six) hours as needed for pain.       acetone, urine, test (ACETONE, URINE, TEST) Strp Check urine for ketones if blood sugar is above 250.  Call Dr Soliz if ketones are moderate or large. 50 strip 3     alcohol swabs (ALCOHOL PADS) PadM Apply 1 Units topically daily. 70% 100 each 3     amLODIPine (NORVASC) 10 MG tablet Take 1 tablet (10 mg total) by mouth daily. 90 tablet 3     aspirin 81 MG EC tablet Take 81 mg by mouth daily.       blood glucose meter (GLUCOMETER) Use 1 each As Directed as needed. Dispense prodigy talking glucose meter 1 each 0     blood glucose test strips One each 4 times per day. Dispense prodigy strips for talking meter 100 each 11     calcium acetate (PHOSLO) 667 mg capsule Take 1 capsule (667 mg total) by mouth 3 (three) times a day. (Patient taking differently: Take 1 capsule by mouth 2 (two) times a day. Indications: Renal Osteodystrophy  "with Hyperphosphatemia) 90 capsule 11     cholecalciferol, vitamin D3, 1,000 unit tablet Take 1 tablet (1,000 Units total) by mouth daily. 90 tablet 2     cinacalcet (SENSIPAR) 30 MG tablet Take 30 mg by mouth daily. Indications: Hyperparathyroidism Secondary to CRF with Dialysis       CLASSIC PRENATAL 28 mg iron- 800 mcg Tab Take 1 tablet by mouth daily. 90 tablet 3     diphenoxylate-atropine (LOMOTIL) 2.5-0.025 mg per tablet Take 1 tablet by mouth 4 (four) times a day as needed for diarrhea. 20 tablet 5     erythromycin ophthalmic ointment Administer 1 application to the right eye 4 (four) times a day.       gabapentin (NEURONTIN) 300 MG capsule Take 300 mg by mouth 4 (four) times a day.       glucagon, human recombinant, (GLUCAGON) 1 mg injection Use as directed for hypoglycemia (Patient taking differently: Inject 1 each under the skin. Use as directed for hypoglycemia) 1 each 3     glucose (DEX4 GLUCOSE) 4 GM chewable tablet Chew 16 g as needed for low blood sugar.       insulin aspart (NOVOLOG FLEXPEN) 100 unit/mL injection pen Inject under the skin. Use in insulin pump up to 80 units daily       insulin lispro (HUMALOG KWIKPEN) 100 unit/mL pen Inject 1-12 Units, 3 times daily with meals (Patient taking differently: For use if insulin pump fails) 5 adj dose pen 0     insulin needles, disposable, (BD INSULIN PEN NEEDLE UF SHORT) 31 X 5/16 \" Ndle Use As Directed 3 (three) times a day.       insulin syringe-needle U-100 (BD INSULIN SYRINGE ULTRA-FINE) 0.3 mL 31 x 5/16\" Syrg Test 3 times daily 100 each 3     LANCING DEVICE MISC Use As Directed.       LANTUS SOLOSTAR 100 unit/mL (3 mL) pen Inject 8 units BID if pump fails (Patient taking differently: Inject 12 Units under the skin as needed (Only used when pump was not working). ) 15 mL PRN     loperamide (IMODIUM A-D) 2 mg tablet Take 1 tablet (2 mg total) by mouth 3 (three) times a day as needed for diarrhea.  0     losartan-hydrochlorothiazide (HYZAAR) 50-12.5 mg " "per tablet Take 1 tablet by mouth every day 90 tablet 2     LUBRICATING PLUS 0.5 % Dpet ophthalmic dropperette Apply 1 drop to eye as needed.   6     metoclopramide (REGLAN) 5 MG tablet Take 5 mg by mouth 2 (two) times a day.        metoprolol tartrate (LOPRESSOR) 50 MG tablet Take 50 mg by mouth 2 (two) times a day.       omeprazole (PRILOSEC) 20 MG capsule Take 20 mg by mouth daily before breakfast.       rOPINIRole (REQUIP) 0.25 MG tablet Take 0.25 mg by mouth bedtime.       transparent dressings 2 3/8 X 2 3/4 \" Bndg Apply 1 patch topically every third day. 6 each 11     No facility-administered medications prior to visit.            "

## 2021-06-13 NOTE — PROGRESS NOTES
"  11/2/2017    Start time: 15:03  Stop Time: 16:00  Session # 44 Session 18 this year.     Sabra Leigh is a 32 y.o. female is being seen today for    Chief Complaint   Patient presents with      Follow Up     Anxiety     Depression     Follow up in regards to ongoing symptom management of anxiety and depression.     New symptoms or complaints:  \" stress from mother's sickness; being told that mother is dying of a cancer\"     Functional Impairment:   Personal: 3.  Family: 3  Social: 3  Work: 4    Clinical assessment of mental status:   Sabra Leigh presented on time.   She was oriented in time. Can't see and needs support but remembers places she has been at and time. She was cooperative and dressed appropriately for this session and weather. Her memory was Normal cognitive functioning .  Her speech was  Within normal.  Language was appropriate.  Concentration and focus is Brief. Psychosis is not noted or reported. She reports her mood is Irritable and Anxious.  Affect is congruent with speech and is Congruent w/content of speech.  Fund of knowledge is adequate. Insight is adequate for therapy.    Suicidal/Homicidal Ideation present: Patient reports she has some passive suicidal ideations. Reports she sometimes thinks that she would be better off dead. Though stated she won't hurt her 16 year old daughter and shared she talk herself over these thoughts. She has no means/intent. Patient agreed to use her safety plan as needed.      Patient's impression of their current status: Patient presented to the clinic continue expressing her feelings and concerns for support and coping strategies.  Patient reports that she has not been doing well due to her mother's illness.  Today she brought up the news that her mother was diagnosed with cancer and and its advanced that that would not be treated.  Her mom has invited her or her children and grandchildren to say goodbye.  Patient wants to process feelings around the " "news for herself in the family. She notes this news is also a source of stress.  Patient indicated that she has a family support and enough people to talk to when she feels overwhelmed by family events.  Her ex partner whom stays in her life and has her primary support just learned that her sister in [place] passed away. Patient had been trying to attend her classes and reports despite the news,she has been doing well and learning more about cooking and Braille.  She continues to follow through with her scheduled appointments. Patient remains optimistic about her tomorrow despite family news.  Expressed satisfaction with today's intervention. Patient plans to come back in 2 weeks for more support.     Therapist impression of patients current state: This 32 y.o. Black or  female presented on time for her therapy session. She was brought in by her medial transportation and was herself  for her session. She appeared awake this time and was more engaged. Though was tearful around mother's illness and the fact she was told her mother will not live longer.  Writer processed with the patient around presenting issues. Offered empathy and validated her feelings where appropriate.  She responded well with active listening,offering  empathy and validation of her feelings. Patient continues to listen and respond well to a message of empowerment and hope from different sources discussed in the sessions.  This patient practices well her coping skills. She has been challenging her thinking and has been using positive self talk as needed and her community resources.    Assessment tools used today include: How do you feel today?       Type of psychotherapeutic technique provided: Client centered and CBT     Progress toward short term goals:\" feeling sad , depressed\"    Review of long term goals: Updated Treatment Plan: 9/21/2017 next review: 12/21/2017    Diagnosis:     1. Adjustment disorder with mixed anxiety and " depressed mood    2. Major depressive disorder, recurrent episode, moderate     No change    Plan and Follow-up:   1. Patient plans to continue taking her medications as prescribed.  2. Patient plans to spend sometime with her family this weekend  3. Patient will attend her Braille and cooking class as scheduled  4. Patient will see this therapist in 2 weeks.    Discharge Criteria/Planning: Client has chronic symptoms and ongoing therapy for maintenance stability recommended.    Performed and documented by BECKY Dozier- ANA; [facility] 11/2/2017    This note has been dictated using voice recognition software. Any grammatical or context distortions are unintentional and inherent to the software.

## 2021-06-13 NOTE — PROGRESS NOTES
"RN Will  Will add the patient to [facility] RN tracking list  Be available to the patient as nursing needs arise     Care Guide Will     Due Date Delegation   At next Outreach Review goals set at [facility] visit and create or add to action plan   By next Outreach Send referral to [facility] Financial Resource Guide to assist with application for energy assistance.   At next Outreach Schedule appointment with [facility] SW to review possible resources available to pt. Such as a waiver or other services and support.   By next Outreach Pt has a blind \"stick\". Requesting a stick that folds. Explore possible places to get a different, folding stick.   At next Outreach Check if pt wants a nutrition referral. Could get one for Bariatric non-surgical.        SW visit scheduled For Nov 7th @ 11 am   ERIN (with [facility]) scheduled Nov 7th for Energy Assistance  I emailed [email or web address] to determine if they could provide her a folding stick. I don't see an option on their request form. This maybe something she would have to purchase herself. Unsure if that would be a DME and if insurance would cover. I'll first attempt the association for the blind.   The response I received was The Free cane program only distributes rigid canes. However, our online store does sell folding canes. Be sure to check out our web site, [employer]  It looks like the Highest price it could be was $25. When I look at Coverage with [place], it looks like it would be a possible covered product. I'll look into this a little more tomorrow when I'm back in the clinic. I will discuss with patient at next outreach, that a folding walking stick would be a out of pocket cost. I talked with a DME service and she indicated its not a covered service.   "

## 2021-06-13 NOTE — PROGRESS NOTES
Outpatient Mental Health Treatment Plan    Name:  Sabra Leigh  :  1985  MRN:  [id number]     Treatment Plan:  Updated Treatment Plan  Intake/initial treatment plan date:  2015  Benefit and risks and alternatives have been discussed: Yes  Is this treatment appropriate with minimal intrusion/restrictions: Yes  Estimated duration of treatment:  10 +  Anticipated frequency of services:  Every 2 weeks  Necessity for frequency: This frequency is needed to establish therapeutic goals and for continuity of care in order to monitor progress.  Necessity for treatment: To address cognitive, behavioral, and/or emotional barriers in order to work toward goals and to improve quality of life.     Plan:      Depression:  Goal: Decrease average depression level from 3 to 2. No change  Strategies:  ?[X] Decrease social isolation    [X] Increase involvement in meaningful activities    ?[X] Discuss sleep hygiene    ?[X] Explore thoughts and expectations about self and others    ?[X] Process grief (loss of significant person, independence, role, etc.)    ?[X] Assess for suicide risk    ?[ X] Implement physical activity routine (with physician approval)    [  ] Consider introduction of bibliotherapy and/or videos    [X] Continue compliance with medical treatment plan (or explore barriers)      Degree to which this is a problem (1-4): 3-no change  Degree to which goal is met (1-4): 3    Date of next  Review: 2017  ? Anxiety   Goal: Decrease average anxiety level from 3 to 2. No change  Strategies:  ? [X]Learn and practice relaxation techniques and other coping strategies (e.g., thought stopping, reframing, meditation)    ? [X] Increase involvement in meaningful activities    ? [X] Discuss sleep hygiene    ? [X] Explore thoughts and expectations about self and others    ? [X ] Identify and monitor triggers for panic/anxiety symptoms    ? [X ] Implement physical activity routine (with physician approval)    ? [  " ] Consider introduction of bibliotherapy and/or videos    ? [X] Continue compliance with medical treatment plan (or explore barriers)       Degree to which this is a problem (1-4): 3  Degree to which goal is met (1-4): 3  Date of next  Review: 12/21//2017  Functional Impairment: 1=Not at all/Rarely 2=Some days 3=Most Days 4=Every Day    Personal: 3  Family: 4  Social: 3    Work: 4- Unable to see this time    Diagnosis:   Adjustment disorder with mixed anxiety and depressed mood        WHODAS 2.0 12-item version: 38  H1:30    H2: 30  H3: 30  In the last 30 days, patient's level of disability was at 75 % or severe.   Clinical assessments completed: PHQ-9=17; MAISHA-7=20;  WHODAS 2.0( 75 %); CAGE AID: 0/4.   Strengths: \"Doing hair, cooking with some assistance, very optimistic, and positive\"  Limitations: \"Unable to see much, to work as used to; mental health and medical issues.\"  Cultural Considerations:   female living with her 15 year old daughter. Gets a big support from her ex partner. Still experiencing visual, kidney, and diabetes problems; which affects her mental health. Has just started attending Braille and cooking class with [employer] and reports doing better. Mrs. Leigh is determined to work with every provider she can get to feel better. Her depression is getting better and her level of acceptance is better. Mrs Leigh wants to use a holistic approach in her recovery process by strengthing her sary in combination of the [facility]. Mrs. Leigh works with any provider regardless their race and ethnicity.      Persons responsible for this plan:  ? [x] Patient ? [x] Provider ? [] Other: __________________    Provider:[facility] and [facility] FIOR Dzoier[facility]    Date:  9/21/2017  Time:  3:34 PM        Patient Signature:____________________________________ Date: ______________     Guardian Signature: __________________________________ Date: " ______________     Therapist Signature: __________________________________ Date: ______________
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
## 2021-06-13 NOTE — PROGRESS NOTES
"Clinic Care Coordination RN Assessed Needs  Patient Centered Assessment Method-PCAM TOTAL SCORE: 30 (10/9/2017  7:42 PM)  Level 2:  A score of 25-36 indicates that the patient has a moderate initial need for RN or SW intervention at the discretion of the .  The RN will add this patient to her panel and follow closely in partnership with the care guide until stable.  She will reach out to the care guide for support in care coordination needs and graduate the patient to standard care guide outreach when appropriate.      Goals  Goals       [facility]-Patient Stated \"I want to apply for energy assistance.\" (pt-stated)            Action steps to achieve this goal  1.  I will work with [facility]'s Financial Resource Guide to complete the application for energy assistance.  2.    3.      Date goal set:  10/4/17        [facility]-Patient Stated \"I want to become more independent in managing my medications in the next 6 months.\" (pt-stated)            Action steps to achieve this goal  1.  I will continue to work with [employer] staff to become more independent  2.   3.      Date goal set: 10/4/17        [facility]-Patient Stated \"I want to lose 60lbs in 6 months\" (same as previous goal). (pt-stated)            Action steps to achieve this goal  1.  I will continue to go to the pool 1 time a week  2.  I am learning to cook in [employer]. Krystyna and my daughter continue to help me make 2-3 meals a day.  3.  I will continue to ride my exercise bike for 10-15 minutes twice a walk.  4.  I am walking stairs at the [employer] school and will continue to walk 2-3 flights of stairs 3-4 times a week    Date goal set:  10/4/17        I want to improve my Diabetes and my overall health. (pt-stated)            Goals are with Diabetic education.     Goals 2/20/15, 3/6/15, 3/25/15  Keep carbohydrates as consistent as possible    Breakfast 30 grams carbohydrate (1 cup cereal with hard-boiled egg OR breakfast sandwich) OR 45 grams carbohydrate when you " are being more active or when you have dialysis (sandwich + 15 grapes or 1 fruit cup OR 2 flavored packets of instant oatmeal)    Lunch 2-3pm 45 grams carbohydrate (1 1/2 cups spaghetti with white sauce with salad OR 6 inch Subway OR carrots/celery, 1/2 cup fruit, sandwich)    Dinner 45 grams carbohydrate (1 1/2 cups spaghetti with white sauce with salad OR 6 inch [employer]way OR carrots/celery, 1/2 cup fruit, sandwich OR chicken, 1/3 cup rice, 1 slice of bread, 1 fruit cup, broccoli/cauliflower/carrots)    Check into the jen Tap, Tap, See for vision loss.  It will read out loud a picture that you scan and may make it easier to use your Dexcom sensor - PHONE HAS SOMETHING WRONG WITH IT - JEN DOESN'T WORK  Start using Fast Food Guide for sodium and carbohydrates - PT HAS CUT DOWN ON FAST FOOD AND COOKING AT HOME  Ask at dialysis to find out how much sodium each day - 2000 MILLIGRAMS PER DAY  Decrease Lantus to 9 units twice each day - DOING WELL    Goals:  Decrease breakfast Regular to 3 units.  Lunch 11 units  Dinner increase to 10 units  Set alarm for after dialysis to take your insulin when you eat out.  Keep working on consistent carbs 30 grams at breakfast, 30-45 grams at lunch, 30-45 grams at dinner  Use your magnifier to read labels for carbs.        I want to lose weight 60 pounds in 6 months            Action steps to achieve this goal  1. I will walk least 1-2 hours a day or as able. Not discussed  2. I will continue to go to the pool 3-4 times a week for the day during the summer. At this time, unable to go into water due to dialysis port. (10/24/2016)  3. I will work towards reduce fast foods, fried foods and snacking. (Continuous goal). My sister and Krystyna are helping me make my meals 3 times a day. Krystyna Ellis is picking something up from anything on her way home. Maybe potpies chicken or subway. The other day, I had shrimp, chicken, fried fish, rice, salad and broccoli for my meal. I haven't had  "anything to eat yet today (almost noon) because I just got [place]   4. I will ride my exercise bike for 10-15 minutes starting with 1 time a week with Krystyna. I haven't been exercising.     Do you know where I can get a portion plate? Try [employer] and [employer].     Updated:09/06/2017  Date goal set:  12/21/2015            RN Recommendations and Referrals  See below for action plan    Action Plan    RN Will  Will add the patient to [facility] RN tracking list  Be available to the patient as nursing needs arise    Care Guide Will    Due Date Delegation   At next Outreach Review goals set at PCAM visit and create or add to action plan   By next Outreach Send referral to [facility] Financial Resource Guide to assist with application for energy assistance.   At next Outreach Schedule appointment with [facility] SW to review possible resources available to pt. Such as a waiver or other services and support.   By next Outreach Pt has a blind \"stick\". Requesting a stick that folds. Explore possible places to get a different, folding stick.   At next Outreach Check if pt wants a nutrition referral. Could get one for Bariatric non-surgical.       PCAM (Patient Centered Assessment Method) HEALTH AND WELL-BEING  Physical Health Concerns: Diabetes, Chronic Kidney Disease, History of Stroke  Other Physical Health Concerns:: Pt receives dialysis 3 times a week (Tuesday, Thursday and Saturday). Wants a kidney transplant, but needs to lose weight first. Not currently on transplant list, r/t need to lose weight first. Peripheral neuropathy. Diabetic retinopathy. Sight limited. Hospitalizes end of August 2017 with diarrhea. Tests showed lactose intolerance. No C Diff.   RN Assessment: Physical Health Needs: Severe symptoms or problems that cause significant impact on daily life  RN Assessment: Physical Health Problems: Severe imact upon mental well-being and preventing engagment with usual activities  Mental Health " "Concerns: Depression, Anxiety  Other Mental Health Concerns:: Sees therapist every 2 weeks. Parenting 15 yr old daughter challenging.  RN Assessment:Other Mental Well-Being Concern: Mod to severe problems that interfere with function  Lifestyle/Habit Concerns: Diet, Exercise/Activity  Other Lifestyle/Habit Concerns:: Recently started going to [employer] twice a week; Monday for 1/2 day and all day on Fridays. Learning to cook. Has decreased fast food meals. Learning Braille.   RN Assessment: Lifestyle Behaviors: Mod to severe impact on client's well-being, preventing enjoyment of usual activities  SOCIAL ENVIRONMENT  Other Home Environment Concerns:: Lives with 15 yr old daughter, in a 2 bedroom, one level, town house. Loves her house.   RN Assessment: Home Environment: Consistently safe, supportive, stable, no identified problems  Other Daily Activities Concerns:: Recently started going to [employer] twice a week; Monday for 1/2 day and all day on Fridays. Learning to cook. Eats meal at school. Learning Braille. Will be learning keyboarding. Taking woodshop. Learning to walk stairs, walk outside, using a blind \"stick.\"  RN Assessment: Daily Activites: Adequate participation with social networks  Other Social Network Concerns:: Krystyna, is her partner. Good support for pt. Challenging to parent her 15 yr old daughter.  RN Assessment: Social Network: Restricted participation with some degree of social isolation  Financial Status and Service Concerns: Disabled  Other Financial Status and Service Concerns:: Disability $872.00/month plus $161.00/month. SNAP $103.00/month. Has [employer]. Has PCA 7 hours a day, 7 days a week. A nurse visits weekly, to set up pt's med box. Bills include rent, electricity, cell phone plan for pt and daughter.  RN Assessment: Financial Resources: Financially insecure, some resource challenges  HEALTH LITERACY AND COMMUNICATION  Understanding of Health and Wellbeing Concerns: " Denies concerns that require further investigation  RN Assessment: Health Literacy: Reasonable to good understanding and already engages in managing health or is willing to undertake better management  Engagement Concerns: Adequate communication, with or without minor barriers  RN Assessment: Engagement: Adequate communication, with or without minor barriers  Barriers to Compliance with Medical Recommendations: Mental Illness, Transportation  SERVICE COORDINATION  Other Services: Other care/services in place and adequate  Coordination of Services: Required care/services in place and adequately coordinated  PCAM TOTAL SCORE: 30      Emergency Plan  Knowing When to Seek Treatment  Knowing when to seek treatment for mental health disorders is important for parents and families. Many times, families, spouses, or friends are the first to suspect that their loved one is challenged by feelings, behaviors, and/or environmental conditions that cause them to act disruptive, rebellious, or sad. This may include, but is not limited to, problems with relationships with friends and/or family members, work, school, sleeping, eating, substance abuse, emotional expression, development, coping, attentiveness, and responsiveness. It's also important to know that people of different ages will exhibit different symptoms and behaviors. Familiarizing yourself with the common behaviors of children, adolescents, and adults that make it hard for them to adapt to situations will often help to identify any problems early when they can be treated. It's important for families who suspect a problem in one, or more, of these areas to seek treatment as soon as possible. Treatment for mental health disorders is available.    What are the symptoms of a potential problem in an adult?  The following are the most common symptoms of a potential emotional, behavioral, and/or developmental problem in an adult, which necessitates a psychiatric evaluation.  "However, each individual may experience symptoms differently. Symptoms may include:  Significant decline in work performance, poor work attendance, and/or lack of productivity  Social withdrawal from activities, friends, and/or family  Substance (alcohol and drugs) abuse  Sleep disturbances (like persistent nightmares, insomnia, hypersomnia, or flashbacks)  Depression (poor mood, negativity, or mood swings)  Appetite changes (like significant weight gain or loss)  Continuous or frequent aggression  Continuous or frequent anger (for periods longer than 6 months)  Excessive worry and/or anxiety  Threats to self or others  Thoughts of death  Thoughts and/or talk of suicide  Destructive behaviors (like criminal activity, or stealing)  Sexually \"acting out\"  Lying and/or cheating  Many physical complaints, including being constantly tense and/or frequent aches and pains that cannot be traced to a physical cause or injury  Sudden feelings of panic, dizziness, or increased heartbeat  Increased feelings of guilt, helplessness, and/or hopelessness  Decreased energy  The symptoms of a potential emotional, behavioral, and/or developmental problem may look like other conditions. Always talk with your child's health care provider for a diagnosis.          "

## 2021-06-13 NOTE — PROGRESS NOTES
Spoke with Sully today to reminder her of both appointments with [facility] starting at an 11. Patient states, she also as an lab appointment. We will work it out as needed.

## 2021-06-13 NOTE — PROGRESS NOTES
"  10/19/2017    Start time: 15:10  Stop Time: 16:05  Session # 43 Session 17 this year.     Sabra Leigh is a 32 y.o. female is being seen today for    Chief Complaint   Patient presents with      Follow Up     Depression     Anxiety     Follow up in regards to ongoing symptom management of anxiety and depression.     New symptoms or complaints:  \" stress from mother's sickness\"    Functional Impairment:   Personal: 3.  Family: 3  Social: 3  Work: 4    Clinical assessment of mental status:   Sabra Leigh presented on time.   She was oriented in time. Can't see and needs support but remembers places she has been at and time. She was cooperative and dressed appropriately for this session and weather. Her memory was Normal cognitive functioning .  Her speech was  Within normal.  Language was appropriate.  Concentration and focus is Brief. Psychosis is not noted or reported. She reports her mood is Irritable and Anxious.  Affect is congruent with speech and is Congruent w/content of speech.  Fund of knowledge is adequate. Insight is adequate for therapy.    Suicidal/Homicidal Ideation present: Patient reports she has some passive suicidal ideations. Reports she sometimes thinks that she would be better off dead. Though stated she won't hurt her 16 year old daughter and shared she talk herself over these thoughts. She has no means/intent. Patient agreed to use her safety plan as needed.      Patient's impression of their current status: Patient presented to the clinic continue expressing her feelings and concerns for support and coping strategies.  Patient reports that she has been overwhelmed by family issues. This time she found noted that her mother has been in the hospital possibly with a diagnosis of cancer.  Patient expressed disappointment because the family did not want her know about her mother's illness.  Patient states \"what would I be without my mother; I think I am going to become crazy if she dies\". "  Patient also reported that her daughter's boyfriend is in penitentiary.  She she does not have information related about the circumstances that led him to penitentiary. Patient continues to have a difficulties with her GI. She is not able to stay in the full session; wants to run in the bathroom in the middle of the session which has been the case for at least the last 3 past sessions. Reminded this wrier that she has colonoscopy  appointment on 12/11 to find out what is going with her GI. Of the positive note, patient reports doing well with her classes; has bee learning how to make her meals and how to ready Braille. She also reports having been doing exercise and having lost at least 2 pounds.  Patient told this writer despite all the family issues and her health issues, she notes she is more hopeful and optimistic about her tomorrow. Expressed satisfaction with today's intervention. Patient plans to come back in 2 weeks for more support.     Therapist impression of patients current state: This 32 y.o. Black or  female presented on time for her therapy session. She was brought in by her ex partner and was herself this time for her session. She appeared awake this time and was more engaged. Wanted to talk about her success at her school and her family issues. So writer processed with the patient. Offered empathy and validated her feelings where appropriate. Congratulated the patient for continuing losing some pounds since starting the program.  She responds well with active listening,offereding empathy and validation of her feelings. Patient continues to listen and respond well to a message of empowerment and hope from different sources discussed in the sessions.  This patient practices well her coping skills. She has been challenging her thinking and has been using positive self talk as needed.     Assessment tools used today include: How do you feel today?       Type of psychotherapeutic technique provided:  "Client centered and CBT     Progress toward short term goals:\" I am okay today. too much  going on\"    Review of long term goals: Updated Treatment Plan: 9/21/2017 next review: 12/21/2017    Diagnosis:     1. Adjustment disorder with mixed anxiety and depressed mood    2. Major depressive disorder, recurrent episode, moderate     No change    Plan and Follow-up:   1. Patient plans to continue taking her medications as prescribed.  2. Patient plans to spend sometime with her family this weekend  3. Patient will attend her Braille and cooking class as scheduled  4. Patient will see this therapist in 2 weeks.    Discharge Criteria/Planning: Client has chronic symptoms and ongoing therapy for maintenance stability recommended.    Performed and documented by BECKY Dozier- ANA; [facility] 10/19/2017    This note has been dictated using voice recognition software. Any grammatical or context distortions are unintentional and inherent to the software.        "

## 2021-06-13 NOTE — PROGRESS NOTES
Are you working with any other Social workers or providers on this issue?     Schedule appointment with [facility] SW to review possible resources available to pt. Such as a waiver or other services and support.

## 2021-06-13 NOTE — PROGRESS NOTES
"  10/5/2017    Start time: 15:10  Stop Time: 16:05  Session # 42 Session 16 this year.     Sabra Leigh is a 32 y.o. female is being seen today for    Chief Complaint   Patient presents with      Follow Up     Anxiety     Depression     Follow up in regards to ongoing symptom management of anxiety and depression.     New symptoms or complaints: \" stressed\"    Functional Impairment:   Personal: 3.  Family: 3  Social: 3  Work: 4    Clinical assessment of mental status:   Sabra Leigh presented on time.   She was oriented in time. Can't see and needs support but remembers places she has been at and time. She was cooperative and dressed appropriately for this session and weather. Her memory was Normal cognitive functioning .  Her speech was  Within normal.  Language was appropriate.  Concentration and focus is Brief. Psychosis is not noted or reported. She reports her mood is Irritable and Anxious.  Affect is congruent with speech and is Congruent w/content of speech.  Fund of knowledge is adequate. Insight is adequate for therapy.    Suicidal/Homicidal Ideation present: Patient reports she has some passive suicidal ideations. Reports she sometimes thinks that she would be better off dead. Though stated she won't hurt her 16 year old daughter and shared she talk herself over these thoughts. She has no means/intent. Patient agreed to use her safety plan as needed.      Patient's impression of their current status: Patient presented to the clinic continue expressing her feelings and concerns for support and coping strategies.  Patient reports that she has been overwhelmed by family issues.  She also indicated that that she has not been feeling well lately.  Though she indicated that she continues to push herself to attend her scheduled appointments.  She also has been attending her classes for Braille, cooking and other activities.  She reported doing pretty well with her classes.  She reported being excited to " "start walking by herself outside using the stick.  Patient shares that she continue experiencing stomach problems.  She has been taking  trips to the restroom during her therapy sessions.  For this reason, she reports feeling uncomfortable and wanting to find out what is going on with for her health.  She has an endocrinology  appointment next month. Expressed satisfaction with today's intervention. Patient plans to come back in 2 weeks for more support.     Therapist impression of patients current state: This 32 y.o. Black or  female presented on time for her therapy session. She was brought in by the [employer]  and was  herself this time for her session. She was found sleeping at the waiting area. It turns out that she was brought in 1 hour early. She appeared uncomfortable and irritable for waiting for a whole hour.  She was slower than usual and once again spent much time in the bathroom due to stomach pain. She  appears enjoying her new program that is teaching her braille and how to cook. The program is help to lose weight to be able to have kidney transplant. Congratulated the patient for continuing losing some pounds since starting the program.  She responds well with active listening,offereding empathy and validation of her feelings. Patient continues to listen and respond well to a message of empowerment and  Hope from different sources discussed in the sessions.      Assessment tools used today include: How do you feel today?       Type of psychotherapeutic technique provided: Client centered and CBT     Progress toward short term goals:\" More anxious today\"    Review of long term goals: Updated Treatment Plan: 9/21/2017 next review: 12/21/2017    Diagnosis:     1. Adjustment disorder with mixed anxiety and depressed mood     No change    Plan and Follow-up:   1. Patient plans to continue taking her medications as prescribed.  2. Patient plans to spend sometime with her family this " weekend  3. Patient will attend her Braille and cooking class as scheduled  6. Patient will see this therapist in 2 weeks.    Discharge Criteria/Planning: Client has chronic symptoms and ongoing therapy for maintenance stability recommended.    Performed and documented by FIOR Dozier; [facility] 10/5/2017    This note has been dictated using voice recognition software. Any grammatical or context distortions are unintentional and inherent to the software.

## 2021-06-14 NOTE — PROGRESS NOTES
12/7/2017    Start time: 15:07 Stop Time: 16:00 Session # 50 Session 20 this year.     Sabra Leigh is a 32 y.o. female is being seen today for    Chief Complaint   Patient presents with      Follow Up     Depression     Anxiety     Follow up in regards to ongoing symptom management of anxiety and depression.     New symptoms or complaints: None    Functional Impairment:   Personal: 3.  Family: 3  Social: 3  Work: 4    Clinical assessment of mental status:   Sabra Leigh presented on time.   She was oriented in time. Can't see and needs support but remembers places she has been at and time. She was cooperative and dressed appropriately for this session and weather. Her memory was Normal cognitive functioning .  Her speech was  Within normal.  Language was appropriate.  Concentration and focus is Brief. Psychosis is not noted or reported. She reports her mood is Anxious.  Affect is congruent with speech and is Congruent w/content of speech.  Fund of knowledge is adequate. Insight is adequate for therapy.    Suicidal/Homicidal Ideation present: Patient reports she has some passive suicidal ideations. Reports she sometimes thinks that she would be better off dead. Though stated she won't hurt her 16 year old daughter and shared she talk herself over these thoughts. She has no means/intent. Patient agreed to use her safety plan as needed.      Patient's impression of their current status: Patient presented to the clinic continue expressing her feelings and concerns for support and coping strategies. Patient reported she was very happy today because she is excited about her daughter who is turning 16. She is planning a big party for her. Patient also shared sadness about her mother's conditions. She is given one more year to live due to cancer. Patient also expressed sadness about her long tern relationship that ended over a year ago but she was not able to get over. Ex now is in a new relationship. Patient  "shared she feels jealous about this and fears to she won't have anther person to love her due to her illnesses. Patient wants to start processing feelings around these issues.. Patient plans to come back in 2 weeks for more support.     Therapist impression of patients current state: This 32 y.o. Black or  female presented on time for her therapy session. She was brought in by her medical transportation and was herself  for her session. She appeared awake this time and was more engaged. Was loud and shouted several times around the news with weight loss. Writer actively listened to the patient,offered empathy and validated her feelings where appropriate. Patient continues to listen and respond well to a message of empowerment and hope from different sources discussed in the sessions. Patient wants to be listened to and offered empathy and support.     Assessment tools used today include: How do you feel today?       Type of psychotherapeutic technique provided: Client centered and CBT     Progress toward short term goals:\" happy and less depressed\"    Review of long term goals: Updated Treatment Plan: 9/21/2017 next review: 12/21/2017    Diagnosis:     1. Adjustment disorder with mixed anxiety and depressed mood    2. Major depressive disorder, recurrent episode, moderate     No change    Plan and Follow-up:   Keep these goals:  1. Patient plans to continue taking her medications as prescribed.  2. Patient plans to spend sometime with her family this weekend  3. Patient will attend her Braille and cooking class as scheduled  4. Patient will see this therapist in 2 weeks.    Discharge Criteria/Planning: Client has chronic symptoms and ongoing therapy for maintenance stability recommended.    Performed and documented by BECKY Dozier- ANA; [facility] 12/7/2017    This note has been dictated using voice recognition software. Any grammatical or context distortions are unintentional and inherent to " the software.

## 2021-06-14 NOTE — PROGRESS NOTES
Sully was calling to check in on Adopt a family update. I have been struggling to get a direct answer when I check in. There phone number listed isn't correct and the only way of contact is by email. I advised her I will keep her posted once I hear something.

## 2021-06-14 NOTE — PROGRESS NOTES
Programs applying for: Energy Assistance  Case:  Financial worker:      11/29/2017: Spoke with patient today to get daughters ss# [employer] account number. Patient stated that they don't need a application and only need income. We called community action today together and some how they only need income. This was sent today per request. See Care Guide Outreach for further info, if needed.     Forgot to update. Patient's significant other assisted and this was a duplicate application. No further FRG needed at this time.

## 2021-06-14 NOTE — PROGRESS NOTES
Called Sabra with an updated that I have at this time. I was advised that their deadline for [employer] gifts was Dec 18th. We submitted our request on Dec 29th. They stated they would post her information to be adopted but couldn't guarantee anything.  Advised Sabra that this is the last update I have in regards to gifts. If she is to be adopted she would receive a call to coordinate delivery.

## 2021-06-14 NOTE — PROGRESS NOTES
[facility], [place]  ENDOCRINOLOGY    Diabetes Note 11/15/2017    Sabra Leigh, 1985, [id number]          Reason for visit      1. Type 1 diabetes mellitus with complication    2. Diabetic peripheral neuropathy associated with type 1 diabetes mellitus    3. Diarrhea        HPI     Sabra Leigh is a very pleasant 32 y.o. old female who presents for follow up.  SUMMARY:  Sully and her ever present drama, returns today in f/u for DM 1 with some amazing complications.  She continues in dialysis, three days a week because of renal failure in diabetes.  She has significant loss of sight, also secondary to diabetes.  She is here today post training with a cane and is very proud to show me her Braille Board, with which she struggles to show me the Braille Alphabet.  She has been going to School to learn this, as well as how to cook and cope as a person that is fairly sightless.  Her current A1c is actually down to 8.8, which is terrific, and for which I get a high 5.  She continues to use an insulin pump for her insulin administration, because it has auditory clues as to how many units she is giving herself. She tells me that she has not been eating Restaurant food lately, because she and her daughter have been cooking at home. She has lost 10 lbs, which she is quite proud of doing.         Blood glucose data:  Unavailable    Past Medical History     Patient Active Problem List   Diagnosis     Hypertension     Obstructive Sleep Apnea - does not use CPAP - she says she was told she needed it, but that it was not prescribed.     Anemia in CKD (chronic kidney disease)     GERD (gastroesophageal reflux disease)     Diabetic peripheral neuropathy associated with type 1 diabetes mellitus     Type 1 diabetes mellitus with complication     ESRD (end stage renal disease) on dialysis     RLS (restless legs syndrome) - ropinorole, 0.25 mg at hs at hospital discharge - November, 2015     Retinal detachment     Gastroparesis     SHANNON -  "shortness of breath with exertion that is limiting.     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     Cerebral atrophy     Sebaceous cyst of ear     Morbid obesity with BMI of 40.0-44.9, adult     Lipoma of abdominal wall     Chest pain     Stroke - states that her whole left side of her body was numb - this was at [place] (remote)     Cataract     Chronic diarrhea     CTS (carpal tunnel syndrome) - both sides - **possible** - NCV does not clearly show this (February, 2017)     Breast mass, left breast - no evidence of breast mass on clinical examination of 4/7/17     Diarrhea of presumed infectious origin     Diarrhea     Gastroenteritis     Secondary hypertension     Lower abdominal pain     End stage renal disease     Anemia, unspecified type        Family History       family history includes Aneurysm in her mother; Asthma in her brother, daughter, sister, and sister; Depression in her mother; Diabetes in her mother and sister; Hypertension in her mother; Sleep apnea in her brother, mother, sister, and sister; Snoring in her brother, mother, sister, sister, and sister; Stroke in her mother.    Social History      reports that she quit smoking about 19 years ago. Her smoking use included Cigarettes. She has a 0.25 pack-year smoking history. She has never used smokeless tobacco. She reports that she does not drink alcohol or use illicit drugs.      Review of Systems     Patient has no polyuria or polydipsia, no chest pain, dyspnea or TIA's, no numbness, tingling or pain in extremities  Remainder negative except as noted in HPI.    Vital Signs     /82 (Patient Site: Right Arm, Patient Position: Sitting, Cuff Size: Adult Large)  Pulse 84  Ht 4' 11\" (1.499 m)  Wt 212 lb 9.6 oz (96.4 kg)  BMI 42.94 kg/m2  Wt Readings from Last 3 Encounters:   11/14/17 212 lb 9.6 oz (96.4 kg)   09/21/17 219 lb (99.3 kg)   09/05/17 219 lb 9.6 oz (99.6 kg)       Physical Exam     Constitutional: " " Well developed, Well nourished  HENT:  Normocephalic,   Neck: Thyroid normal, No lymph nodes, Supple  Eyes:  PERRL, Conjunctiva pink  Respiratory:  Normal breath sounds, No respiratory distress  Cardiovascular:  Normal heart rate, Normal rhythm, No murmurs  GI:  Bowel sounds normal, Soft, No tenderness          Assessment     1. Type 1 diabetes mellitus with complication    2. Diabetic peripheral neuropathy associated with type 1 diabetes mellitus    3. Diarrhea        Plan     Sully has made great strides in her self care.  Her A1c has not been this good for a year AND she has managed to lose some weight.  I am very proud of her.  She will continue \"workin\" on it and I will see her back next month.  Time spent with pt today: 25 min with >50% spent in counseling and coordination of care.      Misti WILCOX Endocrinology  11/15/2017  4:56 PM        Lab Results     Hemoglobin A1c   Date Value Ref Range Status   11/07/2017 8.8 (H) 3.5 - 6.0 % Final   06/23/2017 10.0 (H) 3.5 - 6.0 % Final   02/03/2017 9.1 (H) 3.5 - 6.0 % Final   10/15/2016 8.7 (H) 4.2 - 6.1 % Final   08/08/2016 9.9 (H) 3.5 - 6.0 % Final     Creatinine   Date Value Ref Range Status   09/01/2017 14.58 (HH) 0.60 - 1.10 mg/dL Final   08/30/2017 10.84 (HH) 0.60 - 1.10 mg/dL Final   06/23/2017 9.80 (HH) 0.60 - 1.10 mg/dL Final     Microalbumin, Random Urine   Date Value Ref Range Status   07/09/2015 499.19 (H) 0.00 - 1.99 mg/dL Final       Cholesterol   Date Value Ref Range Status   04/27/2016 190 <=199 mg/dL Final     HDL Cholesterol   Date Value Ref Range Status   04/27/2016 30 (L) >=50 mg/dL Final     LDL Calculated   Date Value Ref Range Status   04/27/2016  <=129 mg/dL Final     Comment:     Invalid, Triglycerides >400     Triglycerides   Date Value Ref Range Status   04/27/2016 444 (H) <=149 mg/dL Final       Lab Results   Component Value Date    ALT 23 09/01/2017    AST 30 09/01/2017    ALKPHOS 269 (H) 09/01/2017    BILITOT 0.4 09/01/2017 "         Current Medications     Outpatient Medications Prior to Visit   Medication Sig Dispense Refill     acetaminophen (TYLENOL) 325 MG tablet Take 650 mg by mouth every 6 (six) hours as needed for pain.       acetone, urine, test (ACETONE, URINE, TEST) Strp Check urine for ketones if blood sugar is above 250.  Call Dr Soliz if ketones are moderate or large. 50 strip 3     alcohol swabs (ALCOHOL PADS) PadM Apply 1 Units topically daily. 70% 100 each 3     amLODIPine (NORVASC) 10 MG tablet Take 1 tablet (10 mg total) by mouth daily. 90 tablet 3     aspirin 81 MG EC tablet Take 81 mg by mouth daily.       blood glucose meter (GLUCOMETER) Use 1 each As Directed as needed. Dispense prodigy talking glucose meter 1 each 0     blood glucose test strips One each 4 times per day. Dispense prodigy strips for talking meter 100 each 11     calcium acetate (PHOSLO) 667 mg capsule Take 1 capsule (667 mg total) by mouth 3 (three) times a day. (Patient taking differently: Take 1 capsule by mouth 2 (two) times a day. Indications: Renal Osteodystrophy with Hyperphosphatemia) 90 capsule 11     cholecalciferol, vitamin D3, 1,000 unit tablet Take 1 tablet (1,000 Units total) by mouth daily. 90 tablet 2     cinacalcet (SENSIPAR) 30 MG tablet Take 30 mg by mouth daily. Indications: Hyperparathyroidism Secondary to CRF with Dialysis       CLASSIC PRENATAL 28 mg iron- 800 mcg Tab Take 1 tablet by mouth daily. 90 tablet 3     erythromycin ophthalmic ointment Administer 1 application to the right eye 4 (four) times a day.       glucagon, human recombinant, (GLUCAGON) 1 mg injection Use as directed for hypoglycemia (Patient taking differently: Inject 1 each under the skin. Use as directed for hypoglycemia) 1 each 3     glucose (DEX4 GLUCOSE) 4 GM chewable tablet Chew 16 g as needed for low blood sugar.       insulin aspart (NOVOLOG) 100 unit/mL injection For use with pump up to 75 units daily 10 mL PRN     insulin syringe-needle U-100 (BD  "INSULIN SYRINGE ULTRA-FINE) 0.3 mL 31 x 5/16\" Syrg Test 3 times daily 100 each 3     LANCING DEVICE MISC Use As Directed.       LANTUS SOLOSTAR 100 unit/mL (3 mL) pen Inject 8 units BID if pump fails (Patient taking differently: Inject 12 Units under the skin as needed (Only used when pump was not working). ) 15 mL PRN     losartan-hydrochlorothiazide (HYZAAR) 50-12.5 mg per tablet Take 1 tablet by mouth every day 90 tablet 2     LUBRICATING PLUS 0.5 % Dpet ophthalmic dropperette Apply 1 drop to eye as needed.   6     metoclopramide (REGLAN) 5 MG tablet Take 5 mg by mouth 2 (two) times a day.        metoclopramide (REGLAN) 5 MG tablet TAKE ONE TABLET BY MOUTH THREE TIMES DAILY BEFORE MEALS 90 tablet 0     metoprolol tartrate (LOPRESSOR) 50 MG tablet Take 50 mg by mouth 2 (two) times a day.       omeprazole (PRILOSEC) 20 MG capsule Take 20 mg by mouth daily before breakfast.       rOPINIRole (REQUIP) 0.25 MG tablet TAKE ONE TABLET BY MOUTH AT BEDTIME  30 tablet 2     transparent dressings 2 3/8 X 2 3/4 \" Bndg Apply 1 patch topically every third day. 6 each 11     diphenoxylate-atropine (LOMOTIL) 2.5-0.025 mg per tablet Take 1 tablet by mouth 4 (four) times a day as needed for diarrhea. 20 tablet 5     gabapentin (NEURONTIN) 300 MG capsule Take 300 mg by mouth 4 (four) times a day.       gabapentin (NEURONTIN) 300 MG capsule TAKE ONE CAPSULE BY MOUTH 3-4 TIMES DAILY FOR DIABETIC NEUROPATHY 120 capsule 4     insulin aspart (NOVOLOG FLEXPEN) 100 unit/mL injection pen Inject under the skin. Use up to 80 units daily when insulin pump fails       insulin lispro (HUMALOG KWIKPEN) 100 unit/mL pen Inject 1-12 Units, 3 times daily with meals (Patient taking differently: For use if insulin pump fails) 5 adj dose pen 0     insulin needles, disposable, (BD INSULIN PEN NEEDLE UF SHORT) 31 X 5/16 \" Ndle Use As Directed 3 (three) times a day.       loperamide (IMODIUM A-D) 2 mg tablet Take 1 tablet (2 mg total) by mouth 3 (three) " times a day as needed for diarrhea.  0     NOVOLOG 100 unit/mL injection USE UP TO 50 UNITS DAILY WITH INSULIN PUMP 10 mL 0     No facility-administered medications prior to visit.

## 2021-06-14 NOTE — PROGRESS NOTES
"Assessment  SW met with pt to assess current supports.  Pt lives in apartment with 16 year old daughter.  Pt is blind.  She receives dialysis Tues/Th/Sa.  She has a  through [facility] (Sarahy at [phone]).  [facility] connected her with [employer] school and provides funding for transportation (bus card).  Pt attends school Mondays and Fridays.  Pt says she used to have a worker with [facility] who assisted her with applying for CADI waiver.  Pt had assessment and qualified for CADI waiver but declined.  With CADI waiver pts PCA services would have dropped to 2 hours a day.  Pt currently receives 7 hours a day PCA.  Her partner of 11 years is her PCA along with her partners adult daughter.  The agency they work for is [facility].  Pt says she needs more assistance in the morning.  Her daughter has to assist her in the morning and struggles with getting to school on time.  Her annual reassessment for PCA hours is in December.  SHUBHAM advised her to share this with the .  Pt would like services from [facility] again.  These services started in February and ended in August.  SHUBHAM left message for [facility] to inquire about referring pt for [facility] services.  Pt sees therapist Speci[facility] at [facility] and Speciose could likely provide diagnostic assessment needed for [facility] referral.        Action Plan:    will:  1)  Assist with referral to [facility] services at [facility] and will keep pt updated.  SHUBHAM left message at [phone].          Care Guide will:  Care Guide Delegation:   1)  Due Date: When possible        Delegation:  Please assist with getting ASH signed for pts  with [facility] Sarahy at [phone].  Call Sarahy for last name and work address        Subjective     \"I need more help in the mornings.\"        Objective      Appearance: Pt is blind, ambulates with walking stick, casual " dress, appears stated age       Behavior:  Pleasant      Speech: Normal      Emotion/Affect: Stable mood      Thought Processes:  Did not assess      Orientation:   Fully oriented       Memory: Did not assess      General Intellectual Abilities: Did not assess      Judgment: Appears good      Insight: Appears good      Clinical Recommendations:  Possible referral for [facility] services

## 2021-06-14 NOTE — PROGRESS NOTES
We called [employer] action today as she received a call saying they needed her income. I had been pending other information Account # and Daughters SS#. Some how an application was already in. She wasn't aware of sending anything in this year besides working with me. Per Request, I'm sending income verification with the references info #[id number]      Adopt a family tamela request was sent today to provide [place] gifts.     Sabra  Winter boots Size 7  Winter Coat 2 x  Socks  size 7   Sports bra Possibly a size D?  Benjamin Spatula  Depends size xlg       Barbie (Daughter)  Winter boots 9 1/2  Winter Coats Extra Lg  Make up  Socks 9 1/2   Bra 34 C  Underwear Size 8  Things for drawing/painting

## 2021-06-14 NOTE — PROGRESS NOTES
Patient called asking if I submitted the [employer] gift request. This was submitted but she hasn't heard anything. I have sent an email out to them to verify that they have been adopted.  Pending a response back. She then said, you haven't called me in 2 months, I explained we last spoke 11/29. She said, oh yea! Our outreach is every 2 months

## 2021-06-14 NOTE — PROGRESS NOTES
Today I met with Sully to work on completing [employer] assistance after her appointment with SHUBHAM.  We completed the application but is pending her income and daughters SS #. I assisted Sully to the entrance and called Krystyna for her as her phone wasn't working in the building. This will be tracked on [facility] panel.  She was able to get Free folding Cane from School. No further action needed on this.

## 2021-06-14 NOTE — PROGRESS NOTES
"  11/16/2017    Start time: 13:03  Stop Time: 13:56  Session # 45 Session 19 this year.     Sabra Leigh is a 32 y.o. female is being seen today for    Chief Complaint   Patient presents with      Follow Up     Anxiety     Depression     Follow up in regards to ongoing symptom management of anxiety and depression.     New symptoms or complaints:  \" stress from mother's sickness; being told that mother is dying of a cancer\"     Functional Impairment:   Personal: 3.  Family: 3  Social: 3  Work: 4    Clinical assessment of mental status:   Sabra Leigh presented on time.   She was oriented in time. Can't see and needs support but remembers places she has been at and time. She was cooperative and dressed appropriately for this session and weather. Her memory was Normal cognitive functioning .  Her speech was  Within normal.  Language was appropriate.  Concentration and focus is Brief. Psychosis is not noted or reported. She reports her mood is Irritable and Anxious.  Affect is congruent with speech and is Congruent w/content of speech.  Fund of knowledge is adequate. Insight is adequate for therapy.    Suicidal/Homicidal Ideation present: Patient reports she has some passive suicidal ideations. Reports she sometimes thinks that she would be better off dead. Though stated she won't hurt her 16 year old daughter and shared she talk herself over these thoughts. She has no means/intent. Patient agreed to use her safety plan as needed.      Patient's impression of their current status: Patient presented to the clinic continue expressing her feelings and concerns for support and coping strategies. Patient reported she was very happy today and has been this way for the last several because she has lost 8 lbs and her diabetes has improved. She also stated she has been feeling physically well and has not had back pain lately since losing weight. Patient plans to continue with her current plan. Also stated her mother is " "starting chemo soon for her lung cancer. She reports being optimistic and hoping to see her improving soon. She is also optimistic about her tomorrow despite being challenged by her health and family issues.  Expressed satisfaction with today's intervention. Patient plans to come back in 2 weeks for more support.     Therapist impression of patients current state: This 32 y.o. Black or  female presented on time for her therapy session. She was brought in by her medial transportation and was herself  for her session. She appeared awake this time and was more engaged. Was loud and shouted several times around the news with weight loss. Writer actively listened to the patient,offered empathy and validated her feelings where appropriate. Patient continues to listen and respond well to a message of empowerment and hope from different sources discussed in the sessions. Today patient realized that she can lose weight. She also appeared to be aware of obstacle that are related to her physical limitations.  This patient practices well her coping skills. She has been challenging her thinking and has been using positive self talk as needed and her community resources.    Assessment tools used today include: How do you feel today?       Type of psychotherapeutic technique provided: Client centered and CBT     Progress toward short term goals:\" happy and less depressed\"    Review of long term goals: Updated Treatment Plan: 9/21/2017 next review: 12/21/2017    Diagnosis:     1. Adjustment disorder with mixed anxiety and depressed mood    2. Major depressive disorder, recurrent episode, moderate     No change    Plan and Follow-up:   1. Patient plans to continue taking her medications as prescribed.  2. Patient plans to spend sometime with her family this weekend  3. Patient will attend her Braille and cooking class as scheduled  4. Patient will see this therapist in 2 weeks.    Discharge Criteria/Planning: Client " has chronic symptoms and ongoing therapy for maintenance stability recommended.    Performed and documented by BECKY Dozier- ANA; [facility] 11/16/2017    This note has been dictated using voice recognition software. Any grammatical or context distortions are unintentional and inherent to the software.

## 2021-06-15 NOTE — PROGRESS NOTES
Outpatient Mental Health Treatment Plan    Name:  Sabra Leigh  :  1985  MRN:  [id number]  Treatment Plan:  Updated Treatment Plan  Intake/initial treatment plan date:  2015  Benefit and risks and alternatives have been discussed: Yes  Is this treatment appropriate with minimal intrusion/restrictions: Yes  Estimated duration of treatment:  10 +  Anticipated frequency of services:  Every 2 weeks  Necessity for frequency: This frequency is needed to establish therapeutic goals and for continuity of care in order to monitor progress.  Necessity for treatment: To address cognitive, behavioral, and/or emotional barriers in order to work toward goals and to improve quality of life.      Plan:       Depression:  Goal: Decrease average depression level from 3 to 2. No change  Strategies:  ?[X] Decrease social isolation    [X] Increase involvement in meaningful activities    ?[X] Discuss sleep hygiene    ?[X] Explore thoughts and expectations about self and others    ?[X] Process grief (loss of significant person, independence, role, etc.)    ?[X] Assess for suicide risk    ?[ X] Implement physical activity routine (with physician approval)    [  ] Consider introduction of bibliotherapy and/or videos    [X] Continue compliance with medical treatment plan (or explore barriers)   Degree to which this is a problem (1-4): 3-no change  Degree to which goal is met (1-4): 3    Date of next  Review: 2018    ? Anxiety   Goal: Decrease average anxiety level from 3 to 2. No change  Strategies:  ? [X]Learn and practice relaxation techniques and other coping strategies (e.g., thought stopping, reframing, meditation)    ? [X] Increase involvement in meaningful activities    ? [X] Discuss sleep hygiene    ? [X] Explore thoughts and expectations about self and others    ? [X ] Identify and monitor triggers for panic/anxiety symptoms    ? [X ] Implement physical activity routine (with physician approval)    ? [  ] Consider  "introduction of bibliotherapy and/or videos    ? [X] Continue compliance with medical treatment plan (or explore barriers)       Degree to which this is a problem (1-4): 3  Degree to which goal is met (1-4): 3  Date of next  Review:4/04/2018  Functional Impairment: 1=Not at all/Rarely 2=Some days 3=Most Days 4=Every Day    Personal: 3  Family: 4  Social: 3    Work: 4- Unable to see this time    Diagnosis:   Adjustment disorder with mixed anxiety and depressed mood    WHODAS 2.0 12-item version: 38  H1:30    H2: 30  H3: 30  In the last 30 days, patient's level of disability was at 75 % or severe.   Clinical assessments completed: PHQ-9=17; MAISHA-7=20;  WHODAS 2.0( 75 %); CAGE AID: 0/4. No change  Strengths: \"Doing hair, cooking with some assistance, very optimistic, and positive\"  Limitations: \"Legally blind, unable to work as used to; mental health and medical issues.\"  Cultural Considerations: 32 year old   female living with her 16 year old daughter. Gets a big support from her ex partner. Still experiencing visual, kidney, and diabetes problems; which affects her mental health. Doing well with  Braille and cooking class with [facility] and reports doing better. Mrs. Leigh is determined to work with every provider she can get to feel better. Her depression is getting better and her level of acceptance is better. Mrs Leigh wants to use a holistic approach in her recovery process by strengthing her sary in combination of the Western [facility]. Mrs. Leigh works with any provider regardless their race and ethnicity.      Persons responsible for this plan:  ? [x] Patient ? [x] Provider ? [] Other: __________________    Provider:Performed and documented by 1/4/2018    Date:  1/4/2018  Time:  1:50 PM        Patient Signature:____________________________________ Date: ______________     Guardian Signature: __________________________________ Date: ______________     Therapist Signature: " __________________________________ Date: ______________

## 2021-06-15 NOTE — PROGRESS NOTES
[facility] Clinic Note  Patient Name: Sabra Leigh  Patient Age: 32 y.o.  YOB: 1985  MRN: [id number]  ?  Date of Visit: 2/1/2018  Reason for Office Visit:   Chief Complaint   Patient presents with     Follow-up     [facility]. Mass on the right breast. Starting drain on its own. Painful.      HPI: Sabra Leigh 32 y.o. female with history of T1DM, diabetic retinopathy, who presents to clinic for hard mass of right breast proximal to areola. She was seen at [facility] on 1/25 and given an abx. She says she has some crusty possible drainage from area, but cannot describe it due to her visual impairment. It is painful to palpation. She has not noticed a change in size. No known family history of breast ca    Review of Systems: As noted in HPI     Current Scheduled Meds:  Outpatient Encounter Prescriptions as of 2/1/2018   Medication Sig Dispense Refill     acetaminophen (TYLENOL) 325 MG tablet Take 650 mg by mouth every 6 (six) hours as needed for pain.       acetone, urine, test (ACETONE, URINE, TEST) Strp Check urine for ketones if blood sugar is above 250.  Call Dr Soliz if ketones are moderate or large. 50 strip 3     alcohol swabs (ALCOHOL PADS) PadM Apply 1 Units topically daily. 70% 100 each 3     amLODIPine (NORVASC) 10 MG tablet Take 1 tablet (10 mg total) by mouth daily. (Patient taking differently: Take 5 mg by mouth daily. Decreased from 10mg to 5mg daily as of 11/30/17  Indications: hypertension) 90 tablet 3     amoxicillin-clavulanate (AUGMENTIN) 875-125 mg per tablet Take 1 tablet by mouth 2 (two) times a day for 10 days. 20 tablet 0     aspirin 81 MG EC tablet Take 81 mg by mouth daily.       blood glucose test strips One each 4 times per day. Dispense prodigy strips for talking meter 100 each 11     calcium acetate (PHOSLO) 667 mg capsule Take 1 capsule (667 mg total) by mouth 3 (three) times a day. (Patient taking differently: Take 1 capsule by mouth 2 (two) times a day. Indications:  "Renal Osteodystrophy with Hyperphosphatemia) 90 capsule 11     cholecalciferol, vitamin D3, 1,000 unit tablet Take 1 tablet (1,000 Units total) by mouth daily. 90 tablet 2     cinacalcet (SENSIPAR) 30 MG tablet Take 30 mg by mouth daily. Indications: Hyperparathyroidism Secondary to CRF with Dialysis       CLASSIC PRENATAL 28 mg iron- 800 mcg Tab Take 1 tablet by mouth daily. 90 tablet 3     diphenoxylate-atropine (LOMOTIL) 2.5-0.025 mg per tablet TAKE ONE TABLET BY MOUTH FOUR TIMES DAILY AS NEEDED FOR DIARRHEA 20 tablet 4     erythromycin ophthalmic ointment Administer 1 application to the right eye 4 (four) times a day.       gabapentin (NEURONTIN) 300 MG capsule Take 2 tablets 3 times a day 180 capsule 11     glucagon, human recombinant, (GLUCAGON) 1 mg injection Use as directed for hypoglycemia (Patient taking differently: Inject 1 each under the skin. Use as directed for hypoglycemia) 1 each 3     glucose (DEX4 GLUCOSE) 4 GM chewable tablet Chew 16 g as needed for low blood sugar.       insulin aspart (NOVOLOG FLEXPEN) 100 unit/mL injection pen Use up to 100 units daily when insulin pump fails 30 mL 5     insulin aspart (NOVOLOG) 100 unit/mL injection For use with pump up to 75 units daily 10 mL PRN     insulin syringe-needle U-100 (BD INSULIN SYRINGE ULTRA-FINE) 0.3 mL 31 x 5/16\" Syrg Test 3 times daily 100 each 3     LANCING DEVICE MISC Use As Directed.       LANTUS SOLOSTAR 100 unit/mL (3 mL) pen Inject 12 Units under the skin as needed (Only used when pump was not working). 15 mL 5     loperamide (IMODIUM A-D) 2 mg tablet Take 1 tablet (2 mg total) by mouth 3 (three) times a day as needed for diarrhea.  0     losartan-hydrochlorothiazide (HYZAAR) 50-12.5 mg per tablet Take 1 tablet by mouth every day 90 tablet 2     LUBRICATING PLUS 0.5 % Dpet ophthalmic dropperette Apply 1 drop to eye as needed.   6     metoclopramide (REGLAN) 5 MG tablet Take 1 tablet (5 mg total) by mouth 3 (three) times a day before " "meals. Needs to establish care with new provider 90 tablet 0     metoprolol tartrate (LOPRESSOR) 50 MG tablet Take 50 mg by mouth 2 (two) times a day.       MOVIPREP 100-7.5-2.691 gram PwPk   0     omeprazole (PRILOSEC) 20 MG capsule Take 20 mg by mouth daily before breakfast.       rOPINIRole (REQUIP) 0.25 MG tablet TAKE ONE TABLET BY MOUTH AT BEDTIME 90 tablet 2     transparent dressings 2 3/8 X 2 3/4 \" Bndg Apply 1 patch topically every third day. 6 each 11     No facility-administered encounter medications on file as of 2/1/2018.        Objective / Physical Examination:  BP 92/60  Pulse 82  Ht 4' 11\" (1.499 m)  Wt 214 lb (97.1 kg)  BMI 43.22 kg/m2  Wt Readings from Last 3 Encounters:   02/01/18 214 lb (97.1 kg)   01/09/18 213 lb 11.2 oz (96.9 kg)   12/11/17 212 lb (96.2 kg)     Body mass index is 43.22 kg/(m^2). (>25?)    General Appearance: Alert and oriented in no acute distress  Breast: right breast she has 3.5 in x 2.2 in mass extending diagonal at the 7 o'clock position proximal to areola. No surrounding warmth or erythema. No drainage from nipple. Scaly white crust around areola     Assessment / Plan / Medical Decision Making:      Encounter Diagnoses   Name Primary?     Breast mass, right Yes     Lump or mass in breast      Mass of lower outer quadrant of right breast          1. Breast mass, right  3. Mass of lower outer quadrant of right breast     Unclear but could be fibroadenoma v cyst v diabetic complication or an inflammatory nodules post infection. Given size and location will obtain imaging to evaluate further and will discuss with her after imaging     - US Breast Limited (Focal) Right    Total time spent with patient was 15 minutes with >50% of time spent in face-to-face counseling regarding the above plan     Deyvi Carcamo MD  [facility]   "

## 2021-06-15 NOTE — PROGRESS NOTES
"  1/4/2018    Start time: 12:07 Stop Time: 13:00 Session # 51 Session 21 in 2017 session 1 this year     Sabra Leigh is a 32 y.o. female is being seen today for    Chief Complaint   Patient presents with      Follow Up     Anxiety     Depression      Treatment Plan      Follow up in regards to ongoing symptom management of anxiety and depression.     New symptoms or complaints: None    Functional Impairment:   Personal: 3.  Family: 3  Social: 3  Work: 4    Clinical assessment of mental status:   Sabra Leigh presented on time.   She was oriented in time. Can't see and needs support but remembers places she has been at and time. She was cooperative and dressed appropriately for this session and weather. Her memory was Normal cognitive functioning .  Her speech was  Within normal.  Language was appropriate.  Concentration and focus is Brief. Psychosis is not noted or reported. She reports her mood is Anxious.  Affect is congruent with speech and is Congruent w/content of speech.  Fund of knowledge is adequate. Insight is adequate for therapy.    Suicidal/Homicidal Ideation present: Patient reports she has some passive suicidal ideations. No intent, no plan. \" My daughter is my entire world and I am hopeful\"     Patient's impression of their current status: Patient presented to the clinic continue expressing her feelings and concerns for support and coping strategies. She reports feeling weak today, just got off dialysis. Not happy she has gained weight. Shared she does not want to remain on dialysis and for this, she wants to continue her plan to lose weight. Plans to resume her class which has been helping her achieve this goal.  Has taken a break due to holidays. Mother is now on chemo .. Patient reports it is not promising that her mother will survive the cancer but wants to remain positive. Patient plans to come back in 2 weeks for more support.      Therapist impression of patients current state: This " "32 y.o. Black or  female presented on time for her therapy session. She was brought in by her ex partner and was herself  for her session. She appeared awake this time and was more engaged. Writer actively listened to the patient,offered empathy and validated her feelings where appropriate. Writer supported the patient's goal to continue losing weight. Will continue to encourage her about reducing her calorie intake and to discuss best strategies with her staff during the class . Patient has been eating bars and meals that are contradicting her goals here in the  cafeteria.      Patient wants to be listened to and offered empathy and support.   Assessment tools used today include:  PHQ-9=17; MAISHA-7=20;  WHODAS 2.0 ( 75 %); CAGE AID: 0/4. No change  Type of psychotherapeutic technique provided: Client centered and CBT     Progress toward short term goals:\" happy and less depressed\"    Review of long term goals: Updated Treatment Plan: 1/04/2018 next review: 4/04/2018    1. Adjustment disorder with mixed anxiety and depressed mood    2. Major depressive disorder, recurrent episode, moderate     No change    Plan and Follow-up:   1. Patient plans to continue taking her medications as prescribed.  2. Patient plans to spend sometime quality time with the family  3. Patient will resume her Braille and cooking class this week  4. Patient will see this therapist in 2 weeks.    Discharge Criteria/Planning: Client has chronic symptoms and ongoing therapy for maintenance stability recommended.    Performed and documented by FIOR Dozier; [facility] 1/4/2018    "

## 2021-06-15 NOTE — PROGRESS NOTES
"  2/2/2018    Start time: 12:03 Stop Time: 13:00 session # 3 this year     Sabra Leigh is a 32 y.o. female is being seen today for    Chief Complaint   Patient presents with      Follow Up     Anxiety     Depression      Follow up in regards to ongoing symptom management of anxiety and depression.     New symptoms or complaints: None    Functional Impairment:   Personal: 3.  Family: 3  Social: 3  Work: 4    Clinical assessment of mental status:   Sabra Leigh presented on time.   She was oriented in time. Can't see and needs support but remembers places she has been at and time. She was cooperative and dressed appropriately for this session and weather. Her memory was Normal cognitive functioning .  Her speech was  Within normal.  Language was appropriate.  Concentration and focus is Brief. Psychosis is not noted or reported. She reports her mood is Anxious.  Affect is incongruent with speech and is Congruent w/content of speech.  Fund of knowledge is adequate. Insight is adequate for therapy.    Suicidal/Homicidal Ideation present: Patient reports she has some passive suicidal ideations. No intent, no plan. \" My daughter is my entire world and I am hopeful\"     Patient's impression of their current status: Patient presented to the clinic continue expressing her feelings and concerns for support and coping strategies. She reports feeling weak and anxious due to a lamp on her breast. Stated she is afraid it is a cancer. Has an appointment this afternoon wit her PCP. Shared she is happy that her daughter will no longer be working and going to school and has made a decision to focus on school only. She had expressed concerns about her behaviors and feared she will fail school. This had then increased her depression and anxiety. Today she notes she is feeling better despite her concerns around her breat. Patient still has difficulties to keep up with her diet in order to lose weight. She defensibly shared she " "only eat hamburgers and french fries when she is here in the hospital. Patient wants to remain positive about mother's cancer. Patient plans to come back in 2 weeks for more support.      Therapist impression of patients current state: This 32 y.o. Black or  female presented on time for her therapy session. She was found sleeping on the bunch waiting for he session hour. Though once in the therapy room, she was awake and was more engaged. Writer actively listened to the patient,offered empathy and validated her feelings where appropriate. Writer supported the patient's goal to continue losing weight. Will continue to encourage her about reducing her calorie intake and to discuss best strategies with her staff during the class. Patient has been eating meals that are contradicting her goals to lose weight. Patient wants to be listened to and offered empathy and support. She also appeared to understand she is being cared for and her plans to be consistent with her diet is supported and reinforced. Writer will continue using a non judgmental approach in this process.      Assessment tools used today include: How do you feel today?     Type of psychotherapeutic technique provided: Client centered and CBT     Progress toward short term goals:\" happy and less depressed\"    Review of long term goals: Updated Treatment Plan: 1/04/2018 next review: 4/04/2018    1. Adjustment disorder with mixed anxiety and depressed mood    2. Major depressive disorder, recurrent episode, moderate     No change    Plan and Follow-up:   1. Patient plans to continue taking her medications as prescribed.  2. Patient plans to spend sometime quality time with the family  3. Patient will attend her Braille and cooking class this week  4. Patient will see this therapist in 2 weeks.    Discharge Criteria/Planning: Client has chronic symptoms and ongoing therapy for maintenance stability recommended.    Performed and documented by " Mimi Johnson,BEKCY- LICSW; [facility] 2/2/2018

## 2021-06-15 NOTE — PROGRESS NOTES
"  1/18/2018    Start time: 12:03 Stop Time: 13:00 session # 2 this year     Sabra Leigh is a 32 y.o. female is being seen today for    Chief Complaint   Patient presents with      Follow Up     Depression     Anxiety      Follow up in regards to ongoing symptom management of anxiety and depression.     New symptoms or complaints: None    Functional Impairment:   Personal: 3.  Family: 3  Social: 3  Work: 4    Clinical assessment of mental status:   Sabra Leigh presented on time.   She was oriented in time. Can't see and needs support but remembers places she has been at and time. She was cooperative and dressed appropriately for this session and weather. Her memory was Normal cognitive functioning .  Her speech was  Within normal.  Language was appropriate.  Concentration and focus is Brief. Psychosis is not noted or reported. She reports her mood is Anxious.  Affect is congruent with speech and is Congruent w/content of speech.  Fund of knowledge is adequate. Insight is adequate for therapy.    Suicidal/Homicidal Ideation present: Patient reports she has some passive suicidal ideations. No intent, no plan. \" My daughter is my entire world and I am hopeful\"     Patient's impression of their current status: Patient presented to the clinic continue expressing her feelings and concerns for support and coping strategies. She reports feeling weak today, just got off dialysis. Still wants to lose weight but continues to sabotage it with hamburgers and french fries. Was seen many times buying this type of food here in the cafeteria. For the reason, she has not been able to lose weight. fareed confronted, she shared' the more I am reminded about my failure, the more I want to keep doing it\". Patient expressed frustration around her relationship with her daughter. Expressed concerns about her behaviors and fears she will fail school.. Patient wants to remain positive about mother's cancer. Patient plans to come back " "in 2 weeks for more support.      Therapist impression of patients current state: This 32 y.o. Black or  female presented on time for her therapy session. She was brought in by her ex partner and was herself  for her session. She appeared awake this time and was more engaged. Writer actively listened to the patient,offered empathy and validated her feelings where appropriate. Writer supported the patient's goal to continue losing weight. Will continue to encourage her about reducing her calorie intake and to discuss best strategies with her staff during the class . Patient has been eating meals that are contradicting her goals to lose weight. Patient wants to be listened to and offered empathy and support.     Assessment tools used today include: How do you feel today?     Type of psychotherapeutic technique provided: Client centered and CBT     Progress toward short term goals:\" happy and less depressed\"    Review of long term goals: Updated Treatment Plan: 1/04/2018 next review: 4/04/2018    1. Adjustment disorder with mixed anxiety and depressed mood    2. Major depressive disorder, recurrent episode, moderate     No change    Plan and Follow-up:   1. Patient plans to continue taking her medications as prescribed.  2. Patient plans to spend sometime quality time with the family  3. Patient will resume her Braille and cooking class this week  4. Patient will see this therapist in 2 weeks.    Discharge Criteria/Planning: Client has chronic symptoms and ongoing therapy for maintenance stability recommended.    Performed and documented by FIOR Dozier; [facility] 1/18/2018    "

## 2021-06-15 NOTE — PROGRESS NOTES
Assessment:       Mastitis, right      Plan:       1. Augmentin  2. Follow-up with PCP in 4 days for symptom check  3. Discussed signs of worsening infection and when to follow-up with PCP if no symptom improvement.       Patient Instructions   You were seen today for an infection of the breast tissue known as mastitis. We will treat this with a course of antibiotics. You may also take a probiotic like yogurt to help with any stomach irritation while taking the antibiotics.    Reasons to return for re-evaluation:  - Spreading redness  - Fever of 100.4 or higher  - Drainage develops    Recommend follow-up with primary care in 4 days for progress check.    Subjective:        Sabar Leigh is a 32 y.o. female here for evaluation of breast mass. Onset of symptoms was 2 days ago. Symptoms include tenderness and erythema to the right breast. Patient has had similar symptoms before in the left breast, which resolved spontaneously. Patient denies fever, nausea, vomiting, and is not breast-feeding.     The following portions of the patient's history were reviewed and updated as appropriate: allergies, current medications and problem list.    Review of Systems  Pertinent items are noted in HPI.     Allergies  Allergies   Allergen Reactions     Apple Juice Hives     Metformin Hives     Orange Juice Diarrhea         Objective:       /70  Pulse 78  Temp 97.9  F (36.6  C) (Oral)   Resp 16  SpO2 96%  Breastfeeding? No  General appearance: alert, appears stated age, cooperative, no distress and non-toxic  Breasts: No nipple retraction or dimpling, No nipple discharge or bleeding, positive findings: erythema and tenderness lateral to the areola, palpable fluctuant 6cm mass underneath area of erythema

## 2021-06-15 NOTE — PROGRESS NOTES
ASSESSMENT: Onychauxis, type 1 diabetes with neurologic manifestations, foot pain.    PLAN: Toenails were debrided manually and mechanically x10. Return to clinic in nine weeks.         SUBJECTIVE: Toenails are long, thick and painful. Last nail debridement in the clinic was September 11, 2017. She continues with dialysis. Vision is significantly impaired.    OBJECTIVE:  General: Pleasant 32 y.o. female in no acute distress.  Vascular: DP pulses are diminished. PT pulses are diminished. Pedal hair is absent. Feet are cool to the touch.  Neuro: Sensation in the feet is absent.  She describes neuropathy up to the knees.  Some altered sensation in the hands as well.  Derm: Toenails are thickened and dystrophic with discoloration and subungual debris. Plantar skin is xerotic, but intact.  Musculoskeletal: Adequate passive range of motion and foot and ankle joints.

## 2021-06-15 NOTE — PROGRESS NOTES
"[facility]  ENDOCRINOLOGY    Diabetes Note 1/10/2018    Sabra Leigh, 1985, [id number]          Reason for visit      1. Type 1 diabetes mellitus with complication    2. Diabetic peripheral neuropathy associated with type 1 diabetes mellitus        HPI     Sabra Leigh is a very pleasant 32 y.o. old female who presents for follow up.  SUMMARY:  Sully returns today in f/u for Type 1 DM with complications.  I notice that her eyes look really good today.  She tells me that she had someone tell her the same thing yesterday.  They are usually quite blood shot and irritated.  She tells me that her pump is not working and that she thinks that the reservoir has the same amount of insulin in it that she had two days ago. She has used her Lantus and Novolog for the past two days and her numbers have actually been better than they are with the pump, sort of anyway.  We will get Loyda in here to check it out.  In the mean time, she tells me that she has not been to school in 1 month, which puts her behind in her learning to function as a blind person.  She reports that the new clinic was hard for her to navigate because of the large expanse and her difficulty in finding the elevators.  She made it through her colonoscopy recently. She tells me that her feet and her hands are bothering her and wonders if we can increase her Gabapentin.  Her meter download shows not enough testing, which isn't new at all.  She had two \"HI\" readings, and a low of 77 yesterday.  There were several days that weren't too back, with numbers in the low 100s.  She also has several with really high numbers.        Past Medical History     Patient Active Problem List   Diagnosis     Hypertension     Obstructive Sleep Apnea - does not use CPAP - she says she was told she needed it, but that it was not prescribed.     Anemia in CKD (chronic kidney disease)     GERD (gastroesophageal reflux disease)     Diabetic peripheral neuropathy associated " with type 1 diabetes mellitus     Type 1 diabetes mellitus with complication     ESRD (end stage renal disease) on dialysis     RLS (restless legs syndrome) - ropinorole, 0.25 mg at hs at hospital discharge - November, 2015     Retinal detachment     Gastroparesis     SHANNON - shortness of breath with exertion that is limiting.     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     Cerebral atrophy     Sebaceous cyst of ear     Morbid obesity with BMI of 40.0-44.9, adult     Lipoma of abdominal wall     Chest pain     Stroke - states that her whole left side of her body was numb - this was at [facility] (remote)     Cataract     Chronic diarrhea     CTS (carpal tunnel syndrome) - both sides - **possible** - NCV does not clearly show this (February, 2017)     Breast mass, left breast - no evidence of breast mass on clinical examination of 4/7/17     Diarrhea of presumed infectious origin     Diarrhea     Gastroenteritis     Secondary hypertension     Lower abdominal pain     End stage renal disease     Anemia, unspecified type     Dysphagia        Family History       family history includes Aneurysm in her mother; Asthma in her brother, daughter, sister, and sister; Depression in her mother; Diabetes in her mother and sister; Hypertension in her mother; Sleep apnea in her brother, mother, sister, and sister; Snoring in her brother, mother, sister, sister, and sister; Stroke in her mother.    Social History      reports that she quit smoking about 20 years ago. Her smoking use included Cigarettes. She has a 0.25 pack-year smoking history. She has never used smokeless tobacco. She reports that she does not drink alcohol or use illicit drugs.      Review of Systems     Patient has no polyuria or polydipsia, no chest pain, dyspnea or TIA's, no numbness, tingling or pain in extremities  Remainder negative except as noted in HPI.    Vital Signs     /78 (Patient Site: Right Arm, Patient  "Position: Sitting, Cuff Size: Adult Regular)  Pulse 84  Ht 4' 11\" (1.499 m)  Wt 213 lb 11.2 oz (96.9 kg)  BMI 43.16 kg/m2  Wt Readings from Last 3 Encounters:   01/09/18 213 lb 11.2 oz (96.9 kg)   12/11/17 212 lb (96.2 kg)   11/21/17 212 lb 4.8 oz (96.3 kg)       Physical Exam     Constitutional:  Well developed, Well nourished  HENT:  Normocephalic,   Neck: Thyroid normal, No lymph nodes, Supple  Eyes:  PERRL, Conjunctiva pink  Respiratory:  Normal breath sounds, No respiratory distress  Cardiovascular:  Normal heart rate, Normal rhythm, No murmurs  GI:  Bowel sounds normal, Soft, No tenderness        Assessment     1. Type 1 diabetes mellitus with complication    2. Diabetic peripheral neuropathy associated with type 1 diabetes mellitus        Plan     Loyda was able to show her (or at least tell her) that her pump is functioning well and for her to disengage it from her body when taking it off, rather than taking the reservoir out.  That will keep it from getting confused.  She will do that.  She will get herself back to school soon.  I increased her Gabapentin to 600 mg TID rather than 300 mg QID, which is how she was taking it. I cautioned her about possible somnolence.  She will f/u with me in about 6 weeks.  Time spent with pt today: 25 min with >50% spent in counseling and coordination of care.          Misti WILCOX Endocrinology  1/10/2018  11:06 AM        Lab Results     Hemoglobin A1c   Date Value Ref Range Status   11/07/2017 8.8 (H) 3.5 - 6.0 % Final   06/23/2017 10.0 (H) 3.5 - 6.0 % Final   02/03/2017 9.1 (H) 3.5 - 6.0 % Final   10/15/2016 8.7 (H) 4.2 - 6.1 % Final   08/08/2016 9.9 (H) 3.5 - 6.0 % Final     Creatinine   Date Value Ref Range Status   09/01/2017 14.58 (HH) 0.60 - 1.10 mg/dL Final   08/30/2017 10.84 (HH) 0.60 - 1.10 mg/dL Final   06/23/2017 9.80 (HH) 0.60 - 1.10 mg/dL Final     Microalbumin, Random Urine   Date Value Ref Range Status   07/09/2015 499.19 (H) 0.00 - 1.99 mg/dL " Final       Cholesterol   Date Value Ref Range Status   04/27/2016 190 <=199 mg/dL Final     HDL Cholesterol   Date Value Ref Range Status   04/27/2016 30 (L) >=50 mg/dL Final     LDL Calculated   Date Value Ref Range Status   04/27/2016  <=129 mg/dL Final     Comment:     Invalid, Triglycerides >400     Triglycerides   Date Value Ref Range Status   04/27/2016 444 (H) <=149 mg/dL Final       Lab Results   Component Value Date    ALT 23 09/01/2017    AST 30 09/01/2017    ALKPHOS 269 (H) 09/01/2017    BILITOT 0.4 09/01/2017         Current Medications     Outpatient Medications Prior to Visit   Medication Sig Dispense Refill     acetaminophen (TYLENOL) 325 MG tablet Take 650 mg by mouth every 6 (six) hours as needed for pain.       acetone, urine, test (ACETONE, URINE, TEST) Strp Check urine for ketones if blood sugar is above 250.  Call Dr Soliz if ketones are moderate or large. 50 strip 3     alcohol swabs (ALCOHOL PADS) PadM Apply 1 Units topically daily. 70% 100 each 3     amLODIPine (NORVASC) 10 MG tablet Take 1 tablet (10 mg total) by mouth daily. (Patient taking differently: Take 5 mg by mouth daily. Decreased from 10mg to 5mg daily as of 11/30/17  Indications: hypertension) 90 tablet 3     aspirin 81 MG EC tablet Take 81 mg by mouth daily.       blood glucose test strips One each 4 times per day. Dispense prodigy strips for talking meter 100 each 11     calcium acetate (PHOSLO) 667 mg capsule Take 1 capsule (667 mg total) by mouth 3 (three) times a day. (Patient taking differently: Take 1 capsule by mouth 2 (two) times a day. Indications: Renal Osteodystrophy with Hyperphosphatemia) 90 capsule 11     cholecalciferol, vitamin D3, 1,000 unit tablet Take 1 tablet (1,000 Units total) by mouth daily. 90 tablet 2     cinacalcet (SENSIPAR) 30 MG tablet Take 30 mg by mouth daily. Indications: Hyperparathyroidism Secondary to CRF with Dialysis       CLASSIC PRENATAL 28 mg iron- 800 mcg Tab Take 1 tablet by mouth daily.  "90 tablet 3     diphenoxylate-atropine (LOMOTIL) 2.5-0.025 mg per tablet TAKE ONE TABLET BY MOUTH FOUR TIMES DAILY AS NEEDED FOR DIARRHEA 20 tablet 4     erythromycin ophthalmic ointment Administer 1 application to the right eye 4 (four) times a day.       glucagon, human recombinant, (GLUCAGON) 1 mg injection Use as directed for hypoglycemia (Patient taking differently: Inject 1 each under the skin. Use as directed for hypoglycemia) 1 each 3     glucose (DEX4 GLUCOSE) 4 GM chewable tablet Chew 16 g as needed for low blood sugar.       insulin aspart (NOVOLOG) 100 unit/mL injection For use with pump up to 75 units daily 10 mL PRN     insulin syringe-needle U-100 (BD INSULIN SYRINGE ULTRA-FINE) 0.3 mL 31 x 5/16\" Syrg Test 3 times daily 100 each 3     LANCING DEVICE MISC Use As Directed.       LANTUS SOLOSTAR 100 unit/mL (3 mL) pen Inject 12 Units under the skin as needed (Only used when pump was not working). 15 mL 5     loperamide (IMODIUM A-D) 2 mg tablet Take 1 tablet (2 mg total) by mouth 3 (three) times a day as needed for diarrhea.  0     losartan-hydrochlorothiazide (HYZAAR) 50-12.5 mg per tablet Take 1 tablet by mouth every day 90 tablet 2     LUBRICATING PLUS 0.5 % Dpet ophthalmic dropperette Apply 1 drop to eye as needed.   6     metoclopramide (REGLAN) 5 MG tablet Take 5 mg by mouth 2 (two) times a day.        metoprolol tartrate (LOPRESSOR) 50 MG tablet Take 50 mg by mouth 2 (two) times a day.       omeprazole (PRILOSEC) 20 MG capsule Take 20 mg by mouth daily before breakfast.       rOPINIRole (REQUIP) 0.25 MG tablet TAKE ONE TABLET BY MOUTH AT BEDTIME 90 tablet 2     transparent dressings 2 3/8 X 2 3/4 \" Bndg Apply 1 patch topically every third day. 6 each 11     blood glucose meter (GLUCOMETER) Use 1 each As Directed as needed. Dispense prodigy talking glucose meter 1 each 0     gabapentin (NEURONTIN) 300 MG capsule Take 1 capsule 4 times a day 120 capsule 11     insulin aspart (NOVOLOG FLEXPEN) 100 " unit/mL injection pen Use up to 80 units daily when insulin pump fails 15 mL 5     No facility-administered medications prior to visit.

## 2021-06-16 PROBLEM — R09.02 HYPOXIA: Status: ACTIVE | Noted: 2018-01-01

## 2021-06-16 PROBLEM — R13.10 DYSPHAGIA: Chronic | Status: ACTIVE | Noted: 2017-01-01

## 2021-06-16 PROBLEM — K52.9 CHRONIC DIARRHEA: Status: ACTIVE | Noted: 2017-08-30

## 2021-06-16 PROBLEM — F32.1 MODERATE MAJOR DEPRESSION (H): Status: ACTIVE | Noted: 2018-01-01

## 2021-06-16 PROBLEM — E87.5 HYPERKALEMIA: Status: ACTIVE | Noted: 2018-01-01

## 2021-06-16 NOTE — PROGRESS NOTES
[facility] Clinic Note  Patient Name: Sabra Leigh  Patient Age: 33 y.o.  YOB: 1985  MRN: [id number]  ?  Date of Visit: 3/13/2018  Reason for Office Visit:   Chief Complaint   Patient presents with     Follow-up     to see home care. also stated her sugars have been up and she has been feeling sick     Sore Throat     no fever, hard to swallow, no swelling. would like to be checked for strep         HPI: Sabra Leigh 33 y.o. female with a history of T1DM, diabetic retinopathy causing blindness, ESRD on dialysis, who presents to clinic for sore throat, difficulty swallowing, no fevers.her ears are popping.     She has been running high blood sugars past 3 days. Her pump was broken and she spoke with our endocrinologist last month and has not yet called the pump company about the malfunction. She has been using her Lantus and     She was scheduled to establish care with Dr Davalso last month but she had to cancel this due to issues with insurance coverage with this provider. She needs new home health orders placed      Review of Systems: As noted in HPI     Current Scheduled Meds:  Outpatient Encounter Prescriptions as of 3/13/2018   Medication Sig Dispense Refill     acetaminophen (TYLENOL) 325 MG tablet Take 650 mg by mouth every 6 (six) hours as needed for pain.       acetone, urine, test (ACETONE, URINE, TEST) Strp Check urine for ketones if blood sugar is above 250.  Call Dr Soliz if ketones are moderate or large. 50 strip 3     alcohol swabs (ALCOHOL PADS) PadM Apply 1 Units topically daily. 70% 100 each 3     amLODIPine (NORVASC) 10 MG tablet Take 1 tablet (10 mg total) by mouth daily. (Patient taking differently: Take 5 mg by mouth daily. Decreased from 10mg to 5mg daily as of 11/30/17  Indications: hypertension) 90 tablet 3     aspirin 81 MG EC tablet Take 81 mg by mouth daily.       blood glucose test strips One each 4 times per day. Dispense prodigy strips for talking meter 100  "each 11     calcium acetate (PHOSLO) 667 mg capsule Take 1 capsule (667 mg total) by mouth 3 (three) times a day. (Patient taking differently: Take 1 capsule by mouth 3 (three) times a day with meals. 1 tab with every meals and 1 tab with every snacks per dialysis reported on 3/1/18.  Indications: Renal Osteodystrophy with Hyperphosphatemia) 90 capsule 11     cholecalciferol, vitamin D3, 1,000 unit tablet Take 1 tablet (1,000 Units total) by mouth daily. 90 tablet 2     cinacalcet (SENSIPAR) 30 MG tablet Take 30 mg by mouth daily. Indications: Hyperparathyroidism Secondary to CRF with Dialysis       CLASSIC PRENATAL 28 mg iron- 800 mcg Tab Take 1 tablet by mouth daily. 90 tablet 3     diphenoxylate-atropine (LOMOTIL) 2.5-0.025 mg per tablet TAKE ONE TABLET BY MOUTH FOUR TIMES DAILY AS NEEDED FOR DIARRHEA 20 tablet 4     erythromycin ophthalmic ointment Administer 1 application to the right eye 4 (four) times a day.       gabapentin (NEURONTIN) 300 MG capsule Take 2 tablets 3 times a day 180 capsule 11     glucagon, human recombinant, (GLUCAGON) 1 mg injection Use as directed for hypoglycemia (Patient taking differently: Inject 1 each under the skin. Use as directed for hypoglycemia) 1 each 3     glucose (DEX4 GLUCOSE) 4 GM chewable tablet Chew 16 g as needed for low blood sugar.       insulin aspart (NOVOLOG FLEXPEN) 100 unit/mL injection pen Use up to 100 units daily when insulin pump fails 30 mL 5     insulin aspart (NOVOLOG) 100 unit/mL injection For use with pump up to 75 units daily 10 mL PRN     insulin syringe-needle U-100 (BD INSULIN SYRINGE ULTRA-FINE) 0.3 mL 31 x 5/16\" Syrg Test 3 times daily 100 each 3     LANCING DEVICE MISC Use As Directed.       lancing device with lancets Kit Juan contour brand 1 each 0     LANTUS SOLOSTAR 100 unit/mL (3 mL) pen Inject 12 Units under the skin as needed (Only used when pump was not working). 15 mL 5     loperamide (IMODIUM A-D) 2 mg tablet Take 1 tablet (2 mg total) " "by mouth 3 (three) times a day as needed for diarrhea.  0     losartan-hydrochlorothiazide (HYZAAR) 50-12.5 mg per tablet Take 1 tablet by mouth every day 90 tablet 2     LUBRICATING PLUS 0.5 % Dpet ophthalmic dropperette Apply 1 drop to eye as needed.   6     metoclopramide (REGLAN) 5 MG tablet Take 1 tablet (5 mg total) by mouth 3 (three) times a day before meals. Needs to establish care with new provider 90 tablet 0     metoclopramide (REGLAN) 5 MG tablet TAKE ONE TABLET BY MOUTH THREE TIMES DAILY BEFORE MEALS 270 tablet 0     metoprolol tartrate (LOPRESSOR) 50 MG tablet Take 50 mg by mouth 2 (two) times a day.       omeprazole (PRILOSEC) 20 MG capsule Take 20 mg by mouth daily before breakfast.       rOPINIRole (REQUIP) 0.25 MG tablet TAKE ONE TABLET BY MOUTH AT BEDTIME 90 tablet 2     transparent dressings 2 3/8 X 2 3/4 \" Bndg Apply 1 patch topically every third day. 6 each 11     No facility-administered encounter medications on file as of 3/13/2018.        Objective / Physical Examination:  /80 (Patient Site: Right Arm, Patient Position: Sitting, Cuff Size: Adult Large)  Pulse 79  Temp 98.2  F (36.8  C) (Oral)   Wt 212 lb 12.8 oz (96.5 kg)  SpO2 95%  BMI 42.98 kg/m2  Wt Readings from Last 3 Encounters:   03/13/18 212 lb 12.8 oz (96.5 kg)   02/20/18 214 lb 3.2 oz (97.2 kg)   02/01/18 214 lb (97.1 kg)     Body mass index is 42.98 kg/(m^2). (>25?)    General Appearance: Alert and oriented in no acute distress  Ears: Tympanic membrane clear with landmarks well visualized bilaterally  Eyes: Conjunctivae clear   Nose: Septum midline, nares patent, mucosa moist and without drainage  Throat: Lips and mucosa moist. pharynx without erythema or exudate  Neck: No cervical adenopathy.  Lungs: Clear to auscultation bilaterally. Normal inspiratory and expiratory effort. No w/r/r  Cardiovascular: RRR   Extremities: No edema.  Integumentary: Warm and dry.  Neuro: Alert and oriented, follows commands " appropriately.    Assessment / Plan / Medical Decision Making:      Encounter Diagnoses   Name Primary?     Sore throat Yes        1. Sore throat  Negative rapid strep   Salt gargles, fluids, warm tea/honey     2. Needs to establish care with PCP to continue home care. For time being if forms need to be signed I can do that    3. Encouraged her to speak with the pump company to get this fixed - follow up with endocrine     Total time spent with patient was 15 minutes with >50% of time spent in face-to-face counseling regarding the above plan     Deyvi Carcamo MD  [facility]

## 2021-06-16 NOTE — PROGRESS NOTES
"3/15/2018    Start time: 14:08  Stop Time:15:00   Session # 4 this year    Sabra Leigh is a 33 y.o. female is being seen today for    Chief Complaint   Patient presents with      Follow Up     Depression     Anxiety   .  Follow up in regards to ongoing symptom management of anxiety and depression in the context of her medical problems.     New symptoms or complaints: \" stressed today\"    Functional Impairment:   Personal: 4  Family: 3  Social: 3  Work: 4    Clinical assessment of mental status:   Sabra Leigh presented on time.   She was oriented x3, open and cooperative, and dressed appropriately for this session and weather. Her memory was Normal cognitive functioning .  Her speech was  Within normal.  Language was appropriate. concentration and focus is Within normal. Psychosis is not noted or reported. She reports her mood is Anxious, Depressed and Sad.  Affect is congruent with speech and is Anxious and Depressed.  Fund of knowledge is adequate. Insight is adequate for therapy.    Suicidal/Homicidal Ideation present: Patient denies suicidal and homicidal ideations/means or plans.     Patient's impression of their current status: Patient presented to the clinic continue expressing her feelings and concerns for support and coping strategies. She reports feeling weak and anxious due her sugar level that is too high for the last several days. Reports she is recommended to stop to ER and be checked. She does not think she needs to go now because \" I need my therapy session first; I have a lot to vent\". Patient also expressed pain in her stomach area and has been traveling to the bathroom. Otherwise doing well with with family support. Patient wants to remain positive about mother's cancer and her own medical issues. He  plans to come back in 2 weeks for more support.       Therapist impression of patients current state: This 33 y.o. Black or  female  presented on time for her therapy " "session. Writer actively listened to the patient,offered empathy and validated her feelings where appropriate. Writer supported the patient's recommendation from her PCP to be checked today for her \" unusual sugar level\".  Patient wants to be listened to and offered empathy and support. She also appeared to understand she is being cared for and her plans to be consistent with her diet is supported and reinforced. Writer will continue using a non judgmental approach in this process.     Assessment tools used today include: How do you feel today?    Type of psychotherapeutic technique provided: Client centered and CBT    Progress toward short term goals:reports her suger level has been too high lately.     Review of long term goals:  Updated Treatment Plan: 1/04/2018 next review: 4/04/2018    Diagnosis:   1. Adjustment disorder with mixed anxiety and depressed mood    2. Major depressive disorder, recurrent episode, moderate     No change    Plan and Follow-up:  1.  Patient will follow through with all the recommendations from all her providers including taking her medications as prescribed.  2.  Patient will continue to challenge her thinking using the coping strategies discussed such as the 3 Cs, challenging cognitive distortions and using GLAD as needed.   3.  Patient will see this therapist in 2 weeks    Discharge Criteria/Planning: Patient will continue with follow-up until therapy can be discontinued without return of signs and symptoms.    Performed and documented by BECKY Dozier- ANA; [facility] [place][note 2] 3/15/2018  "

## 2021-06-16 NOTE — PROGRESS NOTES
[facility]  ENDOCRINOLOGY    Diabetes Note 2/21/2018    Sabra Leigh, 1985, [id number]          Reason for visit      1. Type 1 diabetes mellitus with complication    2. 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 Diabetic peripheral neuropathy associated with type 1 diabetes mellitus        HPI     Sabra Leigh is a very pleasant 33 y.o. old female who presents for follow up.  SUMMARY:  Sully returns today in f/u for DM 1.  She is reporting that her pump isn't working - again.  This is something that we were working with her at her last appointment and found that it was working after all.  She is now using her back up system of Lantus and Novolog. She is taking 18 units of Lantus twice daily.  She is visually impaired and is able to manage the insulin pens by counting the clicks. She has brought her Prodigy meter with her, which has an audio feature.  She is unable to input the blood sugar into the pump, unfortunately.  She did bring her meter with her today.  She is not testing three times a day as instructed. She does not eat regularly, and when she does, it is usually something processed.  She is in dialysis three days a week and will often not eat before she goes.  That is a very long time to fast - from dinner the night before to 2 PM.  We have discussed this at length.      Blood glucose data:  66 - 446.      Past Medical History     Patient Active Problem List   Diagnosis     Hypertension     Obstructive Sleep Apnea - does not use CPAP - she says she was told she needed it, but that it was not prescribed.     Anemia in CKD (chronic kidney disease)     GERD (gastroesophageal reflux disease)     Diabetic peripheral neuropathy associated with type 1 diabetes mellitus     Type 1 diabetes mellitus with complication     ESRD (end stage renal disease) on dialysis     RLS (restless legs syndrome) - ropinorole, 0.25 mg at hs at hospital discharge - November, 2015     Retinal detachment      "Gastroparesis     SHANNON - shortness of breath with exertion that is limiting.     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     Cerebral atrophy     Sebaceous cyst of ear     Morbid obesity with BMI of 40.0-44.9, adult     Lipoma of abdominal wall     Chest pain     Stroke - states that her whole left side of her body was numb - this was at [facility] (remote)     Cataract     Chronic diarrhea     CTS (carpal tunnel syndrome) - both sides - **possible** - NCV does not clearly show this (February, 2017)     Breast mass, left breast - no evidence of breast mass on clinical examination of 4/7/17     Diarrhea of presumed infectious origin     Diarrhea     Gastroenteritis     Secondary hypertension     Lower abdominal pain     End stage renal disease     Anemia, unspecified type     Dysphagia        Family History       family history includes Aneurysm in her mother; Asthma in her brother, daughter, sister, and sister; Breast cancer in her maternal aunt and maternal aunt; Depression in her mother; Diabetes in her mother and sister; Hypertension in her mother; Sleep apnea in her brother, mother, sister, and sister; Snoring in her brother, mother, sister, sister, and sister; Stroke in her mother.    Social History      reports that she quit smoking about 20 years ago. Her smoking use included Cigarettes. She has a 0.25 pack-year smoking history. She has never used smokeless tobacco. She reports that she does not drink alcohol or use illicit drugs.      Review of Systems     Patient has no polyuria or polydipsia, no chest pain, dyspnea or TIA's, no numbness, tingling or pain in extremities  Remainder negative except as noted in HPI.    Vital Signs     /78 (Patient Site: Right Arm, Patient Position: Sitting, Cuff Size: Adult Large)  Pulse 78  Ht 4' 11\" (1.499 m)  Wt 214 lb 3.2 oz (97.2 kg)  BMI 43.26 kg/m2  Wt Readings from Last 3 Encounters:   02/20/18 214 lb 3.2 oz (97.2 kg)   02/01/18 " 214 lb (97.1 kg)   01/09/18 213 lb 11.2 oz (96.9 kg)       Physical Exam     Constitutional:  Well developed, Well nourished  HENT:  Normocephalic,   Neck: Thyroid normal, No lymph nodes, Supple  Eyes:  PERRL, Conjunctiva pink  Respiratory:  Normal breath sounds, No respiratory distress  Cardiovascular:  Normal heart rate, Normal rhythm, No murmurs  GI:  Bowel sounds normal, Soft, No tenderness  Musculoskeletal:  No gross deformity or lesions, normal dorsalis pedis pulses  Skin: No acanthosis nigricans, lipoatrophy or lipodystrophy  Neurologic:  Alert & oriented x 3, nonfocal  Psychiatric:  Affect, Mood, Insight appropriate  Diabetic foot exam: no ulcers, charcot's or high risk calluses, no feeling in feet at all - healing sore at the end of R great toe        Assessment     1. Type 1 diabetes mellitus with complication    2. 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 Diabetic peripheral neuropathy associated with type 1 diabetes mellitus        Plan     I have strongly advised Sully to call and get the pump situation resolved.  She states that she will do so.  I have also instructed her to have someone look at her feet every day.  She did not even know that she had a sore at the end of her toe.  She also needs to be using lotion on her feet every day, and we encouraged it. Once again, encouraged to eat more regularly.  She will f/u in 1 month.  Time spent with pt today: 25 min with >50% spent in counseling and coordination of care.        Misti WILCOX Endocrinology  2/21/2018  7:59 AM        Lab Results     Hemoglobin A1c   Date Value Ref Range Status   02/13/2018 9.9 (H) 3.5 - 6.0 % Final   11/07/2017 8.8 (H) 3.5 - 6.0 % Final   06/23/2017 10.0 (H) 3.5 - 6.0 % Final   02/03/2017 9.1 (H) 3.5 - 6.0 % Final   10/15/2016 8.7 (H) 4.2 - 6.1 % Final     Creatinine   Date Value Ref Range Status   09/01/2017 14.58 (HH) 0.60 - 1.10 mg/dL Final   08/30/2017 10.84 (HH) 0.60 - 1.10 mg/dL Final   06/23/2017 9.80  (HH) 0.60 - 1.10 mg/dL Final     Microalbumin, Random Urine   Date Value Ref Range Status   07/09/2015 499.19 (H) 0.00 - 1.99 mg/dL Final       Cholesterol   Date Value Ref Range Status   04/27/2016 190 <=199 mg/dL Final     HDL Cholesterol   Date Value Ref Range Status   04/27/2016 30 (L) >=50 mg/dL Final     LDL Calculated   Date Value Ref Range Status   04/27/2016  <=129 mg/dL Final     Comment:     Invalid, Triglycerides >400     Triglycerides   Date Value Ref Range Status   04/27/2016 444 (H) <=149 mg/dL Final       Lab Results   Component Value Date    ALT 23 09/01/2017    AST 30 09/01/2017    ALKPHOS 269 (H) 09/01/2017    BILITOT 0.4 09/01/2017         Current Medications     Outpatient Medications Prior to Visit   Medication Sig Dispense Refill     acetaminophen (TYLENOL) 325 MG tablet Take 650 mg by mouth every 6 (six) hours as needed for pain.       acetone, urine, test (ACETONE, URINE, TEST) Strp Check urine for ketones if blood sugar is above 250.  Call Dr Soliz if ketones are moderate or large. 50 strip 3     alcohol swabs (ALCOHOL PADS) PadM Apply 1 Units topically daily. 70% 100 each 3     amLODIPine (NORVASC) 10 MG tablet Take 1 tablet (10 mg total) by mouth daily. (Patient taking differently: Take 5 mg by mouth daily. Decreased from 10mg to 5mg daily as of 11/30/17  Indications: hypertension) 90 tablet 3     aspirin 81 MG EC tablet Take 81 mg by mouth daily.       blood glucose test strips One each 4 times per day. Dispense prodigy strips for talking meter 100 each 11     calcium acetate (PHOSLO) 667 mg capsule Take 1 capsule (667 mg total) by mouth 3 (three) times a day. (Patient taking differently: Take 1 capsule by mouth 2 (two) times a day. Indications: Renal Osteodystrophy with Hyperphosphatemia) 90 capsule 11     cholecalciferol, vitamin D3, 1,000 unit tablet Take 1 tablet (1,000 Units total) by mouth daily. 90 tablet 2     cinacalcet (SENSIPAR) 30 MG tablet Take 30 mg by mouth daily.  "Indications: Hyperparathyroidism Secondary to CRF with Dialysis       CLASSIC PRENATAL 28 mg iron- 800 mcg Tab Take 1 tablet by mouth daily. 90 tablet 3     diphenoxylate-atropine (LOMOTIL) 2.5-0.025 mg per tablet TAKE ONE TABLET BY MOUTH FOUR TIMES DAILY AS NEEDED FOR DIARRHEA 20 tablet 4     erythromycin ophthalmic ointment Administer 1 application to the right eye 4 (four) times a day.       gabapentin (NEURONTIN) 300 MG capsule Take 2 tablets 3 times a day 180 capsule 11     glucagon, human recombinant, (GLUCAGON) 1 mg injection Use as directed for hypoglycemia (Patient taking differently: Inject 1 each under the skin. Use as directed for hypoglycemia) 1 each 3     glucose (DEX4 GLUCOSE) 4 GM chewable tablet Chew 16 g as needed for low blood sugar.       insulin aspart (NOVOLOG FLEXPEN) 100 unit/mL injection pen Use up to 100 units daily when insulin pump fails 30 mL 5     insulin aspart (NOVOLOG) 100 unit/mL injection For use with pump up to 75 units daily 10 mL PRN     insulin syringe-needle U-100 (BD INSULIN SYRINGE ULTRA-FINE) 0.3 mL 31 x 5/16\" Syrg Test 3 times daily 100 each 3     LANCING DEVICE MISC Use As Directed.       lancing device with lancets Kit Juan contour brand 1 each 0     LANTUS SOLOSTAR 100 unit/mL (3 mL) pen Inject 12 Units under the skin as needed (Only used when pump was not working). 15 mL 5     loperamide (IMODIUM A-D) 2 mg tablet Take 1 tablet (2 mg total) by mouth 3 (three) times a day as needed for diarrhea.  0     losartan-hydrochlorothiazide (HYZAAR) 50-12.5 mg per tablet Take 1 tablet by mouth every day 90 tablet 2     LUBRICATING PLUS 0.5 % Dpet ophthalmic dropperette Apply 1 drop to eye as needed.   6     metoclopramide (REGLAN) 5 MG tablet Take 1 tablet (5 mg total) by mouth 3 (three) times a day before meals. Needs to establish care with new provider 90 tablet 0     metoclopramide (REGLAN) 5 MG tablet TAKE ONE TABLET BY MOUTH THREE TIMES DAILY BEFORE MEALS 270 tablet 0     " "metoprolol tartrate (LOPRESSOR) 50 MG tablet Take 50 mg by mouth 2 (two) times a day.       MOVIPREP 100-7.5-2.691 gram PwPk   0     omeprazole (PRILOSEC) 20 MG capsule Take 20 mg by mouth daily before breakfast.       rOPINIRole (REQUIP) 0.25 MG tablet TAKE ONE TABLET BY MOUTH AT BEDTIME 90 tablet 2     transparent dressings 2 3/8 X 2 3/4 \" Bndg Apply 1 patch topically every third day. 6 each 11     No facility-administered medications prior to visit.            "

## 2021-06-16 NOTE — PROGRESS NOTES
Patient has an established appointment with Dr. Barba on 2/26/2018. Patient was reminded of this appointment today. Sully states she has gained 2 pounds since we last talked because of the holidays.  I asked if she has been consistent with eating healthy and exercise now to work towards losing the weight. Patient didn't seem motivated in this goal and seems like this has more to do with mindset and motivation to be active in her health.  Sully has been approved for CAP and states its been helping.  She however indicated she got a letter staying her SNAP ended. After a little further discussion, patient didn't get declined she received a decrease in funds due to increase of income. She mentions it was only a $3 increase and she as so many other things to pay for. We talked about how SNAP doesn't look at what expenses we have, but looks at the income and household size. She is currently receiving $15.00 per patient for $130. She know longer has the letter for me to review. She mentioned that she had been borrowing money from her daughter to pay the credit card bills and past rent from her old building($1,000). She then states, this is being taken out of her check. Daughter is no longer working at the [employer] as it was affecting school and they were mistreating her. Patient states, I haven't talked to you in 5 months. I advised her we just spoke right before [place] and we connect every couple months. She said, we did.      I advised patient to have Krystyna help her today call [employer] regarding her Pump. At first she stated there isn't any to help her. Then later when I asked when Krystyna would be there next and if she could help, she then said, anyone can help me when they come. Krystyna or the nurse.

## 2021-06-16 NOTE — PROGRESS NOTES
[facility] Note  Patient Name: Sabra Leigh  Patient Age: 33 y.o.  YOB: 1985  MRN: [id number]  ?  Date of Visit: 3/22/2018  Reason for Office Visit:   Chief Complaint   Patient presents with     Hospital Visit Follow Up     was seen cody      HPI: Sabra Leigh 33 y.o. who presents to clinic for Hospital Follow up. 3/15 - 3/19 she has a past medical history of diabetes mellitus type 1 IDDM, end-stage renal disease on hemodialysis, legally blind, hypertension, GERD, obstructive sleep apnea who presented to the emergency room for the evaluation of weakness with cough and fever, she was admitted for acute respiratory failure with hypoxia. Today she is here for follow and feeling well. She is finishing her course of Levaquin. Still coughing some but improving.     See course by problem:      Acute respiratory failure with hypoxia  - Secondary to pneumonia and/or fluid overload plus history of ROSE  - CT chest is negative for PE  - Improving  - Wean patient off oxygen during daytime  - Patient still requires oxygen overnight  - She is noncompliant with her CPAP  - Overnight oximetry study is negative for hypoxia that requires home oxygen  - Echo showed ejection fraction 60%.      Pneumonia  - Improving Previous   - Normal WBC count, but elevated pro-calcitonin of 1.27.   - Continue Levaquin to finish course as outpatient  - As needed cough medication.      Obstructive sleep apnea  - Patient does have history of ROSE but noncompliant with CPAP  - will try hospital CPAP if that can help to wean her off oxygen  - Overnight oximetry study is negative for hypoxia that required home oxygen  - Discussed with patient the importance of using CPAP at home      Insulin-dependent diabetes mellitus;   - Patient has history of labile blood sugar  - Patient reported that blood sugar was high since last 1 week at home.   - Patient was on insulin pump but not using because of malfunction  -  Appreciate diabetic educator input  - Blood sugar still elevated but patient is noncompliant with diabetic diet  - Add insulin with carb counting meals  - Continue Lantus 18 units twice daily, increased sliding scale from sensitive to standard.   - Continue hypoglycemic protocol  - Follow-up with endocrine as an outpatient to resume insulin pump      Review of Systems: As noted in HPI     Current Scheduled Meds:  Outpatient Encounter Prescriptions as of 3/22/2018   Medication Sig Dispense Refill     acetaminophen (TYLENOL) 325 MG tablet Take 650 mg by mouth every 6 (six) hours as needed for pain.       acetone, urine, test (ACETONE, URINE, TEST) Strp Check urine for ketones if blood sugar is above 250.  Call Dr Soliz if ketones are moderate or large. 50 strip 3     alcohol swabs (ALCOHOL PADS) PadM Apply 1 Units topically daily. 70% 100 each 3     amLODIPine (NORVASC) 5 MG tablet Take 5 mg by mouth at bedtime.       aspirin 81 MG EC tablet Take 81 mg by mouth daily.       blood glucose test strips One each 4 times per day. Dispense prodigy strips for talking meter 100 each 11     calcium acetate (PHOSLO) 667 mg capsule Take 1 capsule (667 mg total) by mouth 3 (three) times a day. (Patient taking differently: Take 667 mg by mouth 3 (three) times a day with meals. ) 90 capsule 11     calcium acetate (PHOSLO) 667 mg capsule Take 667 mg by mouth as needed (snacks).       cholecalciferol, vitamin D3, 1,000 unit tablet Take 1 tablet (1,000 Units total) by mouth daily. (Patient taking differently: Take 1,000 Units by mouth 4 (four) times a week. On non-dialysis days.) 90 tablet 2     cinacalcet (SENSIPAR) 30 MG tablet Take 30 mg by mouth daily. At noon.       CLASSIC PRENATAL 28 mg iron- 800 mcg Tab Take 1 tablet by mouth daily. (Patient taking differently: Take 1 tablet by mouth at bedtime. ) 90 tablet 3     diphenoxylate-atropine (LOMOTIL) 2.5-0.025 mg per tablet TAKE ONE TABLET BY MOUTH FOUR TIMES DAILY AS NEEDED FOR  "DIARRHEA 20 tablet 4     gabapentin (NEURONTIN) 300 MG capsule Take 2 tablets 3 times a day (Patient taking differently: Take 600 mg by mouth 3 (three) times a day. ) 180 capsule 11     glucagon, human recombinant, (GLUCAGON) 1 mg injection Use as directed for hypoglycemia (Patient taking differently: Inject 1 each under the skin. Use as directed for hypoglycemia) 1 each 3     glucose (DEX4 GLUCOSE) 4 GM chewable tablet Chew 16 g as needed for low blood sugar.       insulin aspart (NOVOLOG FLEXPEN) 100 unit/mL injection pen Use up to 100 units daily when insulin pump fails 30 mL 5     insulin aspart (NOVOLOG) 100 unit/mL injection For use with pump up to 75 units daily 10 mL PRN     insulin glargine (LANTUS; BASAGLAR) 100 unit/mL (3 mL) pen Inject 18 units twice daily when insulin pump fails 5 adj dose pen 3     insulin syringe-needle U-100 (BD INSULIN SYRINGE ULTRA-FINE) 0.3 mL 31 x 5/16\" Syrg Test 3 times daily 100 each 3     LANCING DEVICE MISC Use As Directed.       lancing device with lancets Kit Juan contour brand 1 each 0     levoFLOXacin (LEVAQUIN) 500 MG tablet Take 1 tablet (500 mg total) by mouth every other day for 5 doses. 5 tablet 0     loperamide (IMODIUM A-D) 2 mg tablet Take 1 tablet (2 mg total) by mouth 3 (three) times a day as needed for diarrhea.  0     losartan (COZAAR) 50 MG tablet Take 1 tablet (50 mg total) by mouth daily. 30 tablet 0     LUBRICATING PLUS 0.5 % Dpet ophthalmic dropperette Apply 1 drop to eye every hour as needed.   6     metoclopramide (REGLAN) 5 MG tablet Take 1 tablet (5 mg total) by mouth 3 (three) times a day before meals. Needs to establish care with new provider (Patient taking differently: Take 5 mg by mouth see administration instructions. Every morning and noon on non-dialysis days.) 90 tablet 0     metoprolol tartrate (LOPRESSOR) 50 MG tablet Take 50 mg by mouth see administration instructions. Daily at noon on dialysis days.       omeprazole (PRILOSEC) 20 MG " "capsule Take 20 mg by mouth daily before breakfast.       rOPINIRole (REQUIP) 0.25 MG tablet Take 0.25 mg by mouth at bedtime.       transparent dressings 2 3/8 X 2 3/4 \" Bndg Apply 1 patch topically every third day. 6 each 11     [DISCONTINUED] metoprolol tartrate (LOPRESSOR) 50 MG tablet Take 50 mg by mouth see administration instructions. Twice daily on non-dialysis days.       [DISCONTINUED] LANTUS SOLOSTAR U-100 INSULIN 100 unit/mL (3 mL) pen Inject 18 Units under the skin 2 (two) times a day as needed (Only used when pump was not working).       No facility-administered encounter medications on file as of 3/22/2018.        Objective / Physical Examination:  /60 (Patient Site: Right Arm, Patient Position: Sitting, Cuff Size: Adult Regular)  Pulse 87  Temp 97.7  F (36.5  C) (Oral)   Wt 210 lb 6.4 oz (95.4 kg)  SpO2 95%  BMI 42.5 kg/m2  Wt Readings from Last 3 Encounters:   03/22/18 210 lb 6.4 oz (95.4 kg)   03/16/18 215 lb 5 oz (97.7 kg)   03/13/18 212 lb 12.8 oz (96.5 kg)     Body mass index is 42.5 kg/(m^2). (>25?)    General Appearance: Alert and oriented in no acute distress  Lungs: Clear to auscultation bilaterally. Normal inspiratory and expiratory effort. No w/r/r  Cardiovascular: RRR   AExtremities:. No edema.  Integumentary: Warm and dry.   Neuro: Alert and oriented, follows commands appropriately. Grossly normal. Gait normal     Assessment / Plan / Medical Decision Making:      Encounter Diagnoses   Name Primary?     Hospital discharge follow-up Yes     Pneumonia      ROSE (obstructive sleep apnea)      T1DM (type 1 diabetes mellitus)         1. Hospital discharge follow-up  For acute hypoxic respiratory failure. Doing better. No complaints today. Exam clear. Finish abx    2. Pneumonia  Continue to finish course of levo. Lungs CTA, no signs of worsening infection     3. ROSE (obstructive sleep apnea)  Encouraged continue use of cpap. Very important     4. T1DM (type 1 diabetes mellitus)  Still " using injectable, blood sugars improved. Waiting to get pump fixed. Has follow up with endocrine scheduled    5. ESRD  Continue dialysis     Follow up to establish care with PCP in 4-6 weeks or sooner if needed    Total time spent with patient was 25 minutes with >50% of time spent in face-to-face counseling regarding the above plan     Deyvi Carcamo MD  [facility]

## 2021-06-17 NOTE — PROGRESS NOTES
"3/29/2018    Start time: 14:08  Stop Time:15:00   Session # 5 this year    Sabra Leigh is a 33 y.o. female is being seen today for    Chief Complaint   Patient presents with      Follow Up     Anxiety     Depression   .  Follow up in regards to ongoing symptom management of anxiety and depression in the context of her medical problems.     New symptoms or complaints: \" My day is good, my week was okay\"    Functional Impairment:   Personal:3  Family: 3  Social: 3  Work: 4    Clinical assessment of mental status:   Sabra Leigh presented on time.   She was oriented x3, open and cooperative, and dressed appropriately for this session and weather. Her memory was Normal cognitive functioning .  Her speech was  Within normal.  Language was appropriate. concentration and focus is Within normal. Psychosis is not noted or reported. She reports her mood is Congruent w/content of speech.  Affect is congruent with speech and is Congruent w/content of speech.  Fund of knowledge is adequate. Insight is adequate for therapy.    Suicidal/Homicidal Ideation present: Patient denies suicidal and homicidal ideations/means or plans.     Patient's impression of their current status: Patient presented to the clinic continue expressing her feelings and concerns for support and coping strategies. She reports feeling better and recovering well from pneumonia. She was hospitalized on 3/15 here at [facility]  for 4 days after she was diagnosed with pneumonia. Reports she is resuming her daily routines with precaution. Continues to do well with family support.  Mother who has been on chemo is now doing well. Patient is planning to spend Easter time with family and extended family.  She plans to come back in 2 weeks for more support.       Therapist impression of patients current state: This 33 y.o. Black or  female  presented on time for her therapy session. Writer actively listened to the patient,offered empathy and " "validated her feelings where appropriate. Writer processed with the patient her presenting issues/ concerns around her medical and mental health as well as family issues.  Patient wants to be listened to and offered empathy and support. She also appeared to understand she is being cared for and her plans to be consistent with her diet is supported and reinforced. She usually is a big resource for own needs as she is able to articulate them to appropriate providers.   Writer will continue using a non judgmental approach in this process.     Assessment tools used today include: How do you feel today?    Type of psychotherapeutic technique provided: Client centered and CBT    Progress toward short term goals:\" I am feeling good today\"     Review of long term goals:  Updated Treatment Plan: 1/04/2018 next review: 4/04/2018    Diagnosis:   1. Adjustment disorder with mixed anxiety and depressed mood    2. Major depressive disorder, recurrent episode, moderate     No change    Plan and Follow-up:  1.  Patient will follow through with all the recommendations from all her providers including taking her medications as prescribed.  2.  Patient will continue to challenge her thinking using the coping strategies discussed such as the 3 Cs, challenging cognitive distortions and using GLAD as needed.   3.  Patient will see this therapist in 2 weeks    Discharge Criteria/Planning: Patient will continue with follow-up until therapy can be discontinued without return of signs and symptoms.    Performed and documented by FIOR Dozier; [facility] 3/29/2018  "

## 2021-06-17 NOTE — PROGRESS NOTES
"4/13/2018    Start time: 13:08  Stop Time:14:00   Session # 6 this year    Sabra Leigh is a 33 y.o. female is being seen today for    Chief Complaint   Patient presents with      Follow Up     MH Treatment Plan   .  Follow up in regards to ongoing symptom management of anxiety and depression in the context of her medical problems.     New symptoms or complaints: \"I feel I am okay. I just get tired from dialysis\"    Functional Impairment:   Personal:3  Family: 3  Social: 3  Work: 4    Clinical assessment of mental status:   Sabra Leigh presented on time.   She was oriented x3, open and cooperative, and dressed appropriately for this session and weather. Her memory was Normal cognitive functioning .  Her speech was  Within normal.  Language was appropriate. concentration and focus is Within normal. Psychosis is not noted or reported. She reports her mood is Congruent w/content of speech.  Affect is congruent with speech and is Congruent w/content of speech.  Fund of knowledge is adequate. Insight is adequate for therapy.    Suicidal/Homicidal Ideation present: Patient denies suicidal and homicidal ideations/means or plans.     Patient's impression of their current status: Patient presented to the clinic continue expressing her feelings and concerns for support and coping strategies. She reports feeling better and no new concerns today. She stated she feels she completely recovered from the pneumonia. Still fears she won't lose weight to feel better and be placed on the list for kidney transplant recipients.Patient updated her treatment and has recently made some progress in many areas of her life. She reports she feels she only needs to love weights.  She plans to come back in 2 weeks for more support.       Therapist impression of patients current state: This 33 y.o. Black or  female  presented on time for her therapy session. Writer actively listened to the patient,offered empathy and " "validated her feelings where appropriate. Writer processed with the patient her presenting issues/ concerns around her medical and mental health as well as family issues.  Patient wants to be listened to and offered empathy and support. She also appeared to understand she is being cared for and her plans to be consistent with her diet is supported and reinforced. She usually is a big resource for own needs as she is able to articulate them to appropriate providers.   Writer will continue using a non judgmental approach in this process.     Assessment tools used today include: How do you feel today?    Type of psychotherapeutic technique provided: Client centered and CBT    Progress toward short term goals:\" I am feeling good today\"     Review of long term goals:  Updated Treatment Plan:4/12/2018 next review: 7/12/2018    Diagnosis:   1. Adjustment disorder with mixed anxiety and depressed mood    2. Major depressive disorder, recurrent episode, moderate     No change    Plan and Follow-up:  1.  Patient will follow through with all the recommendations from all her providers including taking her medications as prescribed.  2.  Patient will continue to challenge her thinking using the coping strategies discussed such as the 3 Cs, challenging cognitive distortions and using GLAD as needed.   3.  Patient will see this therapist in 2 weeks    Discharge Criteria/Planning: Patient will continue with follow-up until therapy can be discontinued without return of signs and symptoms.    Performed and documented by BECKY Dozier- ANA; [facility]– [place] 4/13/2018  "

## 2021-06-17 NOTE — PROGRESS NOTES
Outpatient Mental Health Treatment Plan    Name:  Sabra Leigh  :  1985  MRN:  [id number]  Treatment Plan:  Updated Treatment Plan  Intake/initial treatment plan date:  2015  Benefit and risks and alternatives have been discussed: Yes  Is this treatment appropriate with minimal intrusion/restrictions: Yes  Estimated duration of treatment:  10 +  Anticipated frequency of services:  Every 2 weeks  Necessity for frequency: This frequency is needed to establish therapeutic goals and for continuity of care in order to monitor progress.  Necessity for treatment: To address cognitive, behavioral, and/or emotional barriers in order to work toward goals and to improve quality of life.      Plan:       Depression:  Goal: Decrease average depression level from 3 to 2.  Strategies:  ?[X] Decrease social isolation    [X] Increase involvement in meaningful activities    ?[X] Discuss sleep hygiene    ?[X] Explore thoughts and expectations about self and others    ?[X] Process grief (loss of significant person, independence, role, etc.)    ?[X] Assess for suicide risk    ?[ X] Implement physical activity routine (with physician approval)    [  ] Consider introduction of bibliotherapy and/or videos    [X] Continue compliance with medical treatment plan (or explore barriers)   Degree to which this is a problem (1-4): 3-no change  Degree to which goal is met (1-4): 3    Date of next  Review: 2018     ? Anxiety   Goal: Decrease average anxiety level from 3 to 2.   Strategies:  ? [X]Learn and practice relaxation techniques and other coping strategies (e.g., thought stopping, reframing, meditation)    ? [X] Increase involvement in meaningful activities    ? [X] Discuss sleep hygiene    ? [X] Explore thoughts and expectations about self and others    ? [X ] Identify and monitor triggers for panic/anxiety symptoms    ? [X ] Implement physical activity routine (with physician approval)    ? [  ] Consider introduction of  "bibliotherapy and/or videos    ? [X] Continue compliance with medical treatment plan (or explore barriers)   Degree to which this is a problem (1-4): 3  Degree to which goal is met (1-4): 3  Date of next  Review: 7/12/2018    Adjustment to disability / changes    Goal:  Increase % acceptance from 50 %  to 60 %.   Strategies: ?[x]  Explore thoughts and expectations about self and others     [x] Explore emotional reactions to illness/injury     ?[x] Learn and practice relaxation techniques and other coping strategies     [x] Implement physical activity routine (with physician approval)                [x] Increase involvement in meaningful activities                [x] Engage in values clarification and goal-setting     [] Consider introduction of bibliotherapy and/or videos                [x] Explore barriers to cooperation with rehabilitation progra?m     Degree to which this is a problem (1-4): 4   Degree to which goal is met (1-4)1  Date of Review: 7/12/2018  Functional Impairment: 1=Not at all/Rarely 2=Some days 3=Most Days 4=Every Day    Personal: 3  Family: 4  Social: 3    Work: 4- Unable to see this time    Diagnosis:   Adjustment disorder with mixed anxiety and depressed mood    WHODAS 2.0 12-item version: 19  H1:25  H2: 20  H3: 18  In the last 30 days, patient's level of disability was at 39.5 % or moderate   Clinical assessments completed: PHQ-9=9; MAISHA-7=20;  WHODAS 2.0( 39.5 %); CAGE AID: 0/4.   Strengths: \"Doing hair, cooking with some assistance, very optimistic, and positive\"  Limitations: \"Legally blind, unable to work as used to; mental health and medical issues.\"  Cultural Considerations: 33 year old   female living with her 16 year old daughter. Gets some support from her ex partner. Still experiencing visual, kidney, and diabetes problems; which affect her mental health. Will have to reenter her  Braille and cooking classes with [employer] ; had many absences and was let " go for now.  Mrs. Leigh is determined to work with every provider she can get to feel better. Her depression is getting better and her level of acceptance is better. Mrs Leigh wants to use a holistic approach in her recovery process by strengthing her sary in combination of the [facility]. Mrs. Leigh works with any provider regardless their race and ethnicity.       Persons responsible for this plan:  ? [x] Patient                     ? [x] Provider                   ? [] Other: __________________     Provider:Performed and documented by 4/12/2018    Date:  4/12/2018  Time:  1:36 PM      Patient Signature:____________________________________ Date: ______________     Guardian Signature: __________________________________ Date: ______________     Therapist Signature: __________________________________ Date: ______________

## 2021-06-17 NOTE — PROGRESS NOTES
[facility]  ENDOCRINOLOGY    Diabetes Note 4/8/2018    Sabra Leigh, 1985, [id number]          Reason for visit      1. Type 1 diabetes mellitus with complication        HPI     Sabra Leigh is a very pleasant 33 y.o. old female who presents for follow up.  SUMMARY:  Sully returns today in f/u for DM 1 with multiple complications.  Her daughter and niece are with her today.  She tells me that her feet are looking much better, and makes one naked for me.  It is a little softer to touch, but not much improved visibly. She reports that she has been eating jelly beans and candy.  She continues to try and use her insulin pump, which she has brought with her today, telling me that it isn't working again.  LAMONTEMARCOS Carlita, was able to get it working for her when she was in Hospital recently for pneumonia.  I am unable to do anything with it today because she hasn't brought along all of the parts and it won't run without them.  She has brought in her Prodigy meter with her.  She is not testing more than once a day because she cannot afford the strips.  Her readings are everything from 81 to HI, which means the reading is above 600.  She often has problems with the canula, and because she cannot test more often, she doesn't know that there is a problem until much later.  In the meantime, her blood sugar is hanging out in a really high place and continues to do damage to her already damaged body. She is taking Lantus, 18 units BID and 12 units of Novolog with meals.  She is currently eating a bag of chips.      Blood glucose data:  81 - HI    Past Medical History     Patient Active Problem List   Diagnosis     Hypertension     Obstructive Sleep Apnea - does not use CPAP - she says she was told she needed it, but that it was not prescribed.     Anemia in CKD (chronic kidney disease)     GERD (gastroesophageal reflux disease)     Diabetic peripheral neuropathy associated with type 1 diabetes mellitus     Type 1 diabetes  mellitus with complication     ESRD (end stage renal disease) on dialysis     RLS (restless legs syndrome) - ropinorole, 0.25 mg at hs at hospital discharge - November, 2015     Retinal detachment     Gastroparesis     SHANNON - shortness of breath with exertion that is limiting.     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     Cerebral atrophy     Sebaceous cyst of ear     Morbid obesity with BMI of 40.0-44.9, adult     Lipoma of abdominal wall     Chest pain     Stroke - states that her whole left side of her body was numb [place] [facility] [place]     Cataract     Chronic diarrhea     CTS (carpal tunnel syndrome) - both sides - **possible** - NCV does not clearly show this (February, 2017)     Breast mass, left breast - no evidence of breast mass on clinical examination of 4/7/17     Diarrhea of presumed infectious origin     Diarrhea     Gastroenteritis     Secondary hypertension     Lower abdominal pain     End stage renal disease     Anemia, unspecified type     Dysphagia     CAP (community acquired pneumonia)     Acute respiratory failure with hypoxia and hypercapnia     Obstructive sleep apnea     Legally blind     Hyperglycemia        Family History       family history includes Aneurysm in her mother; Asthma in her brother, daughter, sister, and sister; Breast cancer in her maternal aunt and maternal aunt; Depression in her mother; Diabetes in her mother and sister; Hypertension in her mother; Sleep apnea in her brother, mother, sister, and sister; Snoring in her brother, mother, sister, sister, and sister; Stroke in her mother.    Social History      reports that she quit smoking about 20 years ago. Her smoking use included Cigarettes. She has a 0.25 pack-year smoking history. She has never used smokeless tobacco. She reports that she does not drink alcohol or use illicit drugs.      Review of Systems     Patient has no polyuria or polydipsia, no chest pain, dyspnea or  "TIA's, no numbness, tingling or pain in extremities  Remainder negative except as noted in HPI.    Vital Signs     BP 94/60  Ht 4' 11\" (1.499 m)  Wt 215 lb (97.5 kg)  BMI 43.42 kg/m2  Wt Readings from Last 3 Encounters:   04/05/18 215 lb (97.5 kg)   03/22/18 210 lb 6.4 oz (95.4 kg)   03/16/18 215 lb 5 oz (97.7 kg)       Physical Exam     Constitutional:  Well developed, Well nourished  HENT:  Normocephalic,   Neck: Thyroid normal, No lymph nodes, Supple  Eyes:  PERRL, Conjunctiva pink  Respiratory:  Normal breath sounds, No respiratory distress  Cardiovascular:  Normal heart rate, Normal rhythm, No murmurs  GI:  Bowel sounds normal, Soft, No tenderness  Musculoskeletal:  No gross deformity or lesions, normal dorsalis pedis pulses  Skin: No acanthosis nigricans, lipoatrophy or lipodystrophy  Neurologic:  Alert & oriented x 3, nonfocal  Psychiatric:  Affect, Mood, Insight appropriate  Diabetic foot exam. Bilateral feet very dry, but improving.        Assessment     1. Type 1 diabetes mellitus with complication        Plan     There are many obstacles to Sully's self care, but usually the worst one is herself. She is really inconsistent with her diet and often makes things worse because of her choices.  She should be watching her sodium because of dialysis (she was complaining about her weight today) and is sitting here eating chips. We have given her a number to call for free Prodigy strips. I have also encouraged her to call Carlita to see if she can talk her through fixing her pump.  In the meantime, I encourage her again to do a better job with her diet and to get rid of the jellybeans.  She will f/u with me in a month.      Misti WILCOX Endocrin[facility]  4/8/2018  9:14 AM      Lab Results     Hemoglobin A1c   Date Value Ref Range Status   02/13/2018 9.9 (H) 3.5 - 6.0 % Final   11/07/2017 8.8 (H) 3.5 - 6.0 % Final   06/23/2017 10.0 (H) 3.5 - 6.0 % Final   02/03/2017 9.1 (H) 3.5 - 6.0 % Final   10/15/2016 8.7 " (H) 4.2 - 6.1 % Final     Creatinine   Date Value Ref Range Status   03/19/2018 8.90 (HH) 0.60 - 1.10 mg/dL Final   03/15/2018 5.62 (H) 0.60 - 1.10 mg/dL Final   09/01/2017 14.58 (HH) 0.60 - 1.10 mg/dL Final     Microalbumin, Random Urine   Date Value Ref Range Status   07/09/2015 499.19 (H) 0.00 - 1.99 mg/dL Final       Cholesterol   Date Value Ref Range Status   04/27/2016 190 <=199 mg/dL Final     HDL Cholesterol   Date Value Ref Range Status   04/27/2016 30 (L) >=50 mg/dL Final     LDL Calculated   Date Value Ref Range Status   04/27/2016  <=129 mg/dL Final     Comment:     Invalid, Triglycerides >400     Triglycerides   Date Value Ref Range Status   04/27/2016 444 (H) <=149 mg/dL Final       Lab Results   Component Value Date    ALT 43 03/19/2018    AST 44 (H) 03/19/2018    ALKPHOS 251 (H) 03/19/2018    BILITOT 0.2 03/19/2018         Current Medications     Outpatient Medications Prior to Visit   Medication Sig Dispense Refill     acetaminophen (TYLENOL) 325 MG tablet Take 650 mg by mouth every 6 (six) hours as needed for pain.       acetone, urine, test (ACETONE, URINE, TEST) Strp Check urine for ketones if blood sugar is above 250.  Call Dr Soliz if ketones are moderate or large. 50 strip 3     alcohol swabs (ALCOHOL PADS) PadM Apply 1 Units topically daily. 70% 100 each 3     amLODIPine (NORVASC) 5 MG tablet Take 5 mg by mouth at bedtime.       aspirin 81 MG EC tablet Take 81 mg by mouth daily.       blood glucose test strips One each 4 times per day. Dispense prodigy strips for talking meter 100 each 11     calcium acetate (PHOSLO) 667 mg capsule Take 1 capsule (667 mg total) by mouth 3 (three) times a day. (Patient taking differently: Take 667 mg by mouth 3 (three) times a day with meals. ) 90 capsule 11     calcium acetate (PHOSLO) 667 mg capsule Take 667 mg by mouth as needed (snacks).       cholecalciferol, vitamin D3, 1,000 unit tablet Take 1 tablet (1,000 Units total) by mouth daily. (Patient taking  "differently: Take 1,000 Units by mouth 4 (four) times a week. On non-dialysis days.) 90 tablet 2     cinacalcet (SENSIPAR) 30 MG tablet Take 30 mg by mouth daily. At noon.       CLASSIC PRENATAL 28 mg iron- 800 mcg Tab Take 1 tablet by mouth daily. (Patient taking differently: Take 1 tablet by mouth at bedtime. ) 90 tablet 3     diphenoxylate-atropine (LOMOTIL) 2.5-0.025 mg per tablet TAKE ONE TABLET BY MOUTH FOUR TIMES DAILY AS NEEDED FOR DIARRHEA 20 tablet 4     gabapentin (NEURONTIN) 300 MG capsule Take 2 tablets 3 times a day (Patient taking differently: Take 600 mg by mouth 3 (three) times a day. ) 180 capsule 11     glucagon, human recombinant, (GLUCAGON) 1 mg injection Use as directed for hypoglycemia (Patient taking differently: Inject 1 each under the skin. Use as directed for hypoglycemia) 1 each 3     glucose (DEX4 GLUCOSE) 4 GM chewable tablet Chew 16 g as needed for low blood sugar.       insulin aspart (NOVOLOG FLEXPEN) 100 unit/mL injection pen Use up to 100 units daily when insulin pump fails 30 mL 5     insulin aspart (NOVOLOG) 100 unit/mL injection For use with pump up to 75 units daily 10 mL PRN     insulin glargine (LANTUS; BASAGLAR) 100 unit/mL (3 mL) pen Inject 18 units twice daily when insulin pump fails 5 adj dose pen 3     insulin syringe-needle U-100 (BD INSULIN SYRINGE ULTRA-FINE) 0.3 mL 31 x 5/16\" Syrg Test 3 times daily 100 each 3     LANCING DEVICE MISC Use As Directed.       lancing device with lancets Kit Juan contour brand 1 each 0     loperamide (IMODIUM A-D) 2 mg tablet Take 1 tablet (2 mg total) by mouth 3 (three) times a day as needed for diarrhea.  0     losartan (COZAAR) 50 MG tablet Take 1 tablet (50 mg total) by mouth daily. 30 tablet 0     LUBRICATING PLUS 0.5 % Dpet ophthalmic dropperette Apply 1 drop to eye every hour as needed.   6     metoclopramide (REGLAN) 5 MG tablet Take 1 tablet (5 mg total) by mouth 3 (three) times a day before meals. Needs to establish care with " "new provider (Patient taking differently: Take 5 mg by mouth see administration instructions. Every morning and noon on non-dialysis days.) 90 tablet 0     metoprolol tartrate (LOPRESSOR) 50 MG tablet Take 50 mg by mouth see administration instructions. Daily at noon on dialysis days.       omeprazole (PRILOSEC) 20 MG capsule Take 20 mg by mouth daily before breakfast.       rOPINIRole (REQUIP) 0.25 MG tablet Take 0.25 mg by mouth at bedtime.       transparent dressings 2 3/8 X 2 3/4 \" Bndg Apply 1 patch topically every third day. 6 each 11     No facility-administered medications prior to visit.            "

## 2021-06-17 NOTE — PROGRESS NOTES
I received a call from Sully checking in on me. It turns out someone removed my name from her care team and she hadn't showed up on my panel.  I thanked her for calling me and explained why she hadn't heard from me. Patient has been trying to est care, but said, she keeps having to get rescheduled. She does have any appointment with Rachel on 5/22. I have placed myself back on the patients care team.     She said, she got kicked out of school because they feel that she needs to live a healthier lifestyle to continue with them. Her plan is to have her nieces come every day in June and take her for a walk for 30 minutes. She also hopes to get back on the bike. I asked what she was going to work towards when it came to nutrition. She said, I think I may do the fish/salad diet.  I advised her to speak with her diabetic educator to make sure this does diet doesn't affect her diabetes. I'm not sure what she means by fish/salad diet. However, previous notes state patient has been eating jellybeans, chips etc.     I sent a message to Misti today as patient stated that she had given her information to contact for helping with test strips. Sully says her insurance doesn't cover these and they are $8 a box. So she can't test 3 times a day.Sully also says she needed to call Carlita today because her meter isn't working but know one is home at the moment to help her.  She is wait for someone to be available.        Next Outreach: 6/20/2018

## 2021-06-17 NOTE — PROGRESS NOTES
ASSESSMENT: Onychauxis, type 1 diabetes with neurologic manifestations, foot pain.    PLAN: Toenails were debrided manually x10. Return to clinic in nine weeks.         SUBJECTIVE: Toenails are long, thick and painful. Last nail debridement in the clinic was January 15, 2018. She continues with dialysis. Vision is significantly impaired.    OBJECTIVE:  General: Pleasant 33 y.o. female in no acute distress.  Vascular: DP pulses are diminished. PT pulses are diminished. Pedal hair is absent. Feet are cool to the touch.  Neuro: Sensation in the feet is absent.  She describes neuropathy up to the knees.  Some altered sensation in the hands as well.  Derm: Toenails are thickened and dystrophic with discoloration and subungual debris. Plantar skin is xerotic, but intact.  Musculoskeletal: Adequate passive range of motion and foot and ankle joints.

## 2021-06-17 NOTE — PROGRESS NOTES
[facility]  ENDOCRINOLOGY    Diabetes Note 5/10/2018    Sabra Leigh, 1985, [id number]          Reason for visit      1. Type 1 diabetes mellitus with complication        HPI     Sabra Leigh is a very pleasant 33 y.o. old female who presents for follow up.  SUMMARY:  Sully returns today in f/u for DM 1.  She continues to struggle with her insulin pump, and has brought it in for help today.  I am unable to trouble shoot with it, and will employ one of the CDEs to help with this.  Her eyes are looking irritated today.  She tells me that she hasn't been getting drops in them because her daughter has not been available to help.  She brought in her glucometer today, and she continues to test only once a day consistently, because she lacks the cash to get more strips.  Some of her numbers are quite good, and others are quite bad.  She continues to get her insulin by injection, and counts the clicks to dial up her insulin. She is almost due for another A1c. She continues to try and do a better job with her food choices, but is often eating things because they are easy for her to make, and not the best choices for her blood sugars.        Blood glucose data:  78 - 528    Past Medical History     Patient Active Problem List   Diagnosis     Hypertension     Obstructive Sleep Apnea - does not use CPAP - she says she was told she needed it, but that it was not prescribed.     Anemia in CKD (chronic kidney disease)     GERD (gastroesophageal reflux disease)     Diabetic peripheral neuropathy associated with type 1 diabetes mellitus     Type 1 diabetes mellitus with complication     ESRD (end stage renal disease) on dialysis     RLS (restless legs syndrome) - ropinorole, 0.25 mg at hs at hospital discharge - November, 2015     Retinal detachment     Gastroparesis     SHANNON - shortness of breath with exertion that is limiting.     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     Cerebral  "atrophy     Sebaceous cyst of ear     Morbid obesity with BMI of 40.0-44.9, adult     Lipoma of abdominal wall     Chest pain     Stroke - states that her whole left side of her body was numb - this was at [facility] (remote)     Cataract     Chronic diarrhea     CTS (carpal tunnel syndrome) - both sides - **possible** - NCV does not clearly show this (February, 2017)     Breast mass, left breast - no evidence of breast mass on clinical examination of 4/7/17     Diarrhea of presumed infectious origin     Diarrhea     Gastroenteritis     Secondary hypertension     Lower abdominal pain     End stage renal disease     Anemia, unspecified type     Dysphagia     CAP (community acquired pneumonia)     Acute respiratory failure with hypoxia and hypercapnia     Obstructive sleep apnea     Legally blind     Hyperglycemia        Family History       family history includes Aneurysm in her mother; Asthma in her brother, daughter, sister, and sister; Breast cancer in her maternal aunt and maternal aunt; Depression in her mother; Diabetes in her mother and sister; Hypertension in her mother; Sleep apnea in her brother, mother, sister, and sister; Snoring in her brother, mother, sister, sister, and sister; Stroke in her mother.    Social History      reports that she quit smoking about 20 years ago. Her smoking use included Cigarettes. She has a 0.25 pack-year smoking history. She has never used smokeless tobacco. She reports that she does not drink alcohol or use illicit drugs.      Review of Systems     Patient has no polyuria or polydipsia, no chest pain, dyspnea or TIA's, no numbness, tingling or pain in extremities  Remainder negative except as noted in HPI.    Vital Signs     /82 (Patient Site: Right Arm, Patient Position: Sitting, Cuff Size: Adult Large)  Pulse 78  Ht 4' 11\" (1.499 m)  Wt 217 lb 3.2 oz (98.5 kg)  BMI 43.87 kg/m2  Wt Readings from Last 3 Encounters:   05/08/18 217 lb 3.2 oz (98.5 kg)   05/03/18 216 lb " (98 kg)   04/05/18 215 lb (97.5 kg)       Physical Exam     Constitutional:  Well developed, Well nourished  HENT:  Normocephalic,   Neck: Thyroid normal, No lymph nodes, Supple  Eyes:  PERRL, Conjunctiva pink  Respiratory:  Normal breath sounds, No respiratory distress  Cardiovascular:  Normal heart rate, Normal rhythm, No murmurs  GI:  Bowel sounds normal, Soft, No tenderness  Musculoskeletal:  No gross deformity or lesions, normal dorsalis pedis pulses  Skin: No acanthosis nigricans, lipoatrophy or lipodystrophy        Assessment     1. Type 1 diabetes mellitus with complication        Plan     We will get her pump to Summer, who will hopefully get things straightened around for her.  I know that she is trying to do better.  She is doing some walking and that is great.  She also tries with her food, but sometimes makes poor choices.  She will f/u in 1 month with me.  Time spent with pt today: 25 min with >50% spent in counseling and coordination of care.        Misti Chapa   Endocrinology  5/10/2018  11:33 AM        Lab Results     Hemoglobin A1c   Date Value Ref Range Status   02/13/2018 9.9 (H) 3.5 - 6.0 % Final   11/07/2017 8.8 (H) 3.5 - 6.0 % Final   06/23/2017 10.0 (H) 3.5 - 6.0 % Final   02/03/2017 9.1 (H) 3.5 - 6.0 % Final   10/15/2016 8.7 (H) 4.2 - 6.1 % Final     Creatinine   Date Value Ref Range Status   03/19/2018 8.90 (HH) 0.60 - 1.10 mg/dL Final   03/15/2018 5.62 (H) 0.60 - 1.10 mg/dL Final   09/01/2017 14.58 (HH) 0.60 - 1.10 mg/dL Final     Microalbumin, Random Urine   Date Value Ref Range Status   07/09/2015 499.19 (H) 0.00 - 1.99 mg/dL Final       Cholesterol   Date Value Ref Range Status   04/27/2016 190 <=199 mg/dL Final     HDL Cholesterol   Date Value Ref Range Status   04/27/2016 30 (L) >=50 mg/dL Final     LDL Calculated   Date Value Ref Range Status   04/27/2016  <=129 mg/dL Final     Comment:     Invalid, Triglycerides >400     Triglycerides   Date Value Ref Range Status   04/27/2016  444 (H) <=149 mg/dL Final       Lab Results   Component Value Date    ALT 43 03/19/2018    AST 44 (H) 03/19/2018    ALKPHOS 251 (H) 03/19/2018    BILITOT 0.2 03/19/2018         Current Medications     Outpatient Medications Prior to Visit   Medication Sig Dispense Refill     acetaminophen (TYLENOL) 325 MG tablet Take 650 mg by mouth every 6 (six) hours as needed for pain.       acetone, urine, test (ACETONE, URINE, TEST) Strp Check urine for ketones if blood sugar is above 250.  Call Dr Soliz if ketones are moderate or large. 50 strip 3     alcohol swabs (ALCOHOL PADS) PadM Apply 1 Units topically daily. 70% 100 each 3     amLODIPine (NORVASC) 5 MG tablet Take 2.5 mg by mouth at bedtime. Indications: hypertension       aspirin 81 MG EC tablet Take 81 mg by mouth daily.       blood glucose test strips One each 4 times per day. Dispense prodigy strips for talking meter 100 each 11     calcium acetate (PHOSLO) 667 mg capsule Take 1 capsule (667 mg total) by mouth 3 (three) times a day. (Patient taking differently: Take 667 mg by mouth 3 (three) times a day with meals. ) 90 capsule 11     cholecalciferol, vitamin D3, 1,000 unit tablet Take 1 tablet (1,000 Units total) by mouth daily. (Patient taking differently: Take 1,000 Units by mouth 4 (four) times a week. On non-dialysis days.) 90 tablet 2     cinacalcet (SENSIPAR) 30 MG tablet Take 30 mg by mouth daily. At noon.       CLASSIC PRENATAL 28 mg iron- 800 mcg Tab Take 1 tablet by mouth daily. (Patient taking differently: Take 1 tablet by mouth at bedtime. ) 90 tablet 3     diphenoxylate-atropine (LOMOTIL) 2.5-0.025 mg per tablet TAKE ONE TABLET BY MOUTH FOUR TIMES DAILY AS NEEDED FOR DIARRHEA 20 tablet 4     gabapentin (NEURONTIN) 300 MG capsule Take 2 tablets 3 times a day (Patient taking differently: Take 600 mg by mouth 3 (three) times a day. ) 180 capsule 11     glucagon, human recombinant, (GLUCAGON) 1 mg injection Use as directed for hypoglycemia (Patient taking  "differently: Inject 1 each under the skin. Use as directed for hypoglycemia) 1 each 3     glucose (DEX4 GLUCOSE) 4 GM chewable tablet Chew 16 g as needed for low blood sugar.       insulin aspart (NOVOLOG FLEXPEN) 100 unit/mL injection pen Use up to 100 units daily when insulin pump fails 30 mL 5     insulin aspart (NOVOLOG) 100 unit/mL injection For use with pump up to 75 units daily 10 mL PRN     insulin glargine (LANTUS; BASAGLAR) 100 unit/mL (3 mL) pen Inject 18 units twice daily when insulin pump fails 5 adj dose pen 1     insulin syringe-needle U-100 (BD INSULIN SYRINGE ULTRA-FINE) 0.3 mL 31 x 5/16\" Syrg Test 3 times daily 100 each 3     LANCING DEVICE MISC Use As Directed.       lancing device with lancets Kit Juan contour brand 1 each 0     loperamide (IMODIUM A-D) 2 mg tablet Take 1 tablet (2 mg total) by mouth 3 (three) times a day as needed for diarrhea.  0     losartan (COZAAR) 50 MG tablet Take 1 tablet (50 mg total) by mouth daily. 30 tablet 0     LUBRICATING PLUS 0.5 % Dpet ophthalmic dropperette Apply 1 drop to eye every hour as needed.   6     metoclopramide (REGLAN) 5 MG tablet Take 1 tablet (5 mg total) by mouth 3 (three) times a day before meals. Needs to establish care with new provider (Patient taking differently: Take 5 mg by mouth see administration instructions. Every morning and noon on non-dialysis days.) 90 tablet 0     metoprolol tartrate (LOPRESSOR) 50 MG tablet Take 50 mg by mouth see administration instructions. Daily at noon on dialysis days.       omeprazole (PRILOSEC) 20 MG capsule Take 20 mg by mouth daily before breakfast.       rOPINIRole (REQUIP) 0.25 MG tablet Take 0.25 mg by mouth at bedtime.       transparent dressings 2 3/8 X 2 3/4 \" Bndg Apply 1 patch topically every third day. 6 each 11     calcium acetate (PHOSLO) 667 mg capsule Take 667 mg by mouth as needed (snacks).       insulin glargine (LANTUS; BASAGLAR) 100 unit/mL (3 mL) pen Inject 18 units twice daily when " insulin pump fails 5 adj dose pen 3     No facility-administered medications prior to visit.

## 2021-06-18 NOTE — PROGRESS NOTES
Assessment: Sabra has type 1 diabetes and is visually impaired due to diabetes.   She is here today to get started back on her Medtronic paradigm insulin pump.   The previous pump had malfunctioned so she hasn't used the pump for several months. She is here today with the replacement pump..  She uses prodigy blood glucose meter and typically checks twice daily.   She takes 18 units Lantus insulin bid and 15 units Novolog insulin with mealshe also uses correction scale), for BG >150 mg/dl, will take 2 units insulin per 50 mg/dl.   Previous pump settings were downloaded from 10/2017 and her total basal rate at that time 17.35 units per day.   Uses the easy bolus for mealtime, one click is one unit insulin.   She reports her 15 y/o daughter or partner will help her with infusion site changes including filling insulin cartridge.   She had used Dexcom in the past and is inquiring about that again.     Plan: Entered pump settings from 10/2017.   This will likely need to be adjusted as her injected basal insulin has been twice that amount.   Pump settings were verified with printed download.  Reviewed backup plan for pump failure.  Reviewed carbohydrate recommendations per meal.    Will submit pharmacy order for Dexcom to see what current insurance coverage is.   She has appt next week with Salome Chapa N.P.       Pump Settings:  Basal:  total   17.35 units per day    12- am   0.70  6 am      0.75  12:30 pm 0.85  3:00 pm   0.70  9 pm      0.650    Easy bolus:   1.0 units per entry          Subjective and Objective:      Sabra Leigh is referred by Salome Chapa NP for Diabetes Education.     Lab Results   Component Value Date    HGBA1C 9.6 (H) 05/22/2018         Goals       I want to improve my Diabetes and my overall health. (pt-stated)            Goals are with Diabetic education.     Goals 2/20/15, 3/6/15, 3/25/15  Keep carbohydrates as consistent as possible    Breakfast 30 grams carbohydrate (1 cup cereal with  "hard-boiled egg OR breakfast sandwich) OR 45 grams carbohydrate when you are being more active or when you have dialysis (sandwich + 15 grapes or 1 fruit cup OR 2 flavored packets of instant oatmeal)    Lunch 2-3pm 45 grams carbohydrate (1 1/2 cups spaghetti with white sauce with salad OR 6 inch [employer] OR carrots/celery, 1/2 cup fruit, sandwich)    Dinner 45 grams carbohydrate (1 1/2 cups spaghetti with white sauce with salad OR 6 inch [employer] OR carrots/celery, 1/2 cup fruit, sandwich OR chicken, 1/3 cup rice, 1 slice of bread, 1 fruit cup, broccoli/cauliflower/carrots)    Check into the jen Tap, Tap, See for vision loss.  It will read out loud a picture that you scan and may make it easier to use your Dexcom sensor - PHONE HAS SOMETHING WRONG WITH IT - JEN DOESN'T WORK  Start using Fast Food Guide for sodium and carbohydrates - PT HAS CUT DOWN ON FAST FOOD AND COOKING AT HOME  Ask at dialysis to find out how much sodium each day - 2000 MILLIGRAMS PER DAY  Decrease Lantus to 9 units twice each day - DOING WELL    Goals:  Decrease breakfast Regular to 3 units.  Lunch 11 units  Dinner increase to 10 units  Set alarm for after dialysis to take your insulin when you eat out.  Keep working on consistent carbs 30 grams at breakfast, 30-45 grams at lunch, 30-45 grams at dinner  Use your magnifier to read labels for carbs.        I want to lose 60lbs in 6 months\" (same as previous goal). (pt-stated)            Action steps to achieve this goal  1.  I will continue to go to the pool 1 time a week- Not discussed today  2.  I will look at getting back in to blind school in July, as I recently was kicked out.   3.  I will work on starting back up on riding my bike for 5 minutes a day.   4.  I will continue to walking stairs at the blind school and will continue to walk 2-3 flights of stairs 3-4 times a week when I'm able to go. (4/20/2018)      Updated: 4/20/2018  Date goal set:  10/4/17            Follow up:   Endocrinology, " in next week      Education:     Monitoring   Meter (per above goals): assessment, discussed, pt returned demonstration,  Monitoring: assessment, discussed, pt returned demonstration, literature provided   BG goals: assessment, discussed, pt returned demonstration, literature provided      Nutrition Management:  assessment, discussed, pt returned demo,  literature provided   Weight:assessment, discussed, pt returned demo,  literature provided   Portions/Balance: assessment, discussed, pt returned demo,  literature provided   Carb ID/Count: assessment, discussed, pt returned demo,  literature provided   Label Reading: assessment, discussed, pt returned demo,  literature provided   Heart Healthy Fats:assessment, discussed, pt returned demo,  literature provided   Menu Planning: assessment, discussed, pt returned demo,  literature provided   Dining Out: assessment, discussed,  literature provided   Physical Activity: assessment, discussed,  literature provided   Medications: assessment, discussed  Orals: assessment, discussed  Injected Medications: Discussed   Storage/Exp:Discussed   Site Rotation: Discussed and Literature provided   Sites Assessed: yes    Diabetes Disease Process: Discussed    Acute Complications: Prevent, Detect, Treat:  Hypoglycemia: Discussed and Literature provided  Hyperglycemia: Discussed    Goal Setting and Problem Solving: Discussed  Barriers: Assessed and Discussed  Psychosocial Adjustments: Discussed      Time spent with the patient: 60 minutes for diabetes education and counseling.   Previous Education: yes  Visit Type:GRAHAM Dorsey  5/25/2018      I agree with the aforementioned diabetes plan.  Misti MENSAH [facility] Endocrinology  5/26/2018  8:34 AM

## 2021-06-18 NOTE — PROGRESS NOTES
"6/14/2018    Start time: 13:03  Stop Time: 14:00   Session # 9    Sabra Leigh is a 33 y.o. female is being seen today for    Chief Complaint   Patient presents with      Follow Up     Anxiety     Depression     Follow up in regards to ongoing symptom management of anxiety and depression.     New symptoms or complaints: \" I don't think I am a good mom\"    Functional Impairment:   Personal: 4  Family: 4  Social: 3  Work: 0 unable to work due to blindness    Clinical assessment of mental status:   Sabra Leigh presented on time.   She was oriented x3, open and cooperative, and dressed appropriately for this session and weather. Her memory was Normal cognitive functioning .  Her speech was  Within normal.  Language was appropriate.  Concentration and focus is Within normal. Psychosis is not noted or reported. She reports her mood is Congruent w/content of speech.  Affect is congruent with speech and is Irritable, Anxious and Depressed.  Fund of knowledge is adequate. Insight is adequate for therapy.    Suicidal/Homicidal Ideation present: Patient denies suicidal and homicidal ideations/means or plans.     Patient's impression of their current status: Patient presented with her 8 year-old niece and daughter. She insisted to bring in her niece in the session. She argued about niece knowing everything about her and that she too has her own therapist and therefore she should be in the session. When reminded about confidentiality, patient stated she did not care. With this, writer was limited in what to discuss with the patient. Patient spent her session interacting with these two children, talking trash  and yelling at each other.  She was more focused on what these she and kids do when together when in the community. She shared  little about how she has been doing. Patient stated she does not think she is a good mother. daugther disagreed with this statement.  At the end of the session, kids left the room to wait " "for the patient in the lobby. Patient then apologized for \"ruining my own session\". Patient plans to discuss her concerns for support as therapy is here for process her feelings around her daily stress. She was receptive to this conversation and expressed she want to use future therapy sessions at her advantage.     Therapist impression of patients current state: This 33 y.o. Black or  female presented to the clinic with high frequency in her voice. She won't mind sitting on the floor for the session. She tends to be loud. Writer reminded the patient about confidentiality. When opportunity presented at the end of the session, writer discussed with the patient the boundary issues with some examples noted in the sessions. Writer and patient will revisited today's conversation alone. It is clear that having a child in the session became inappropriate and disruptive. Over all, patient's mood has been stable.she also has insight about changes in her mood when they occur. Today, she agreed that she went off and apologized for misbehaving in the session encouraging her niece to do the same. Writer will review the patient's statement about \" I don't think I am a good mother\". This discussion was slightly discussed due to having a child in the session.     Assessment tools used today include: How do you feel today?    Type of psychotherapeutic technique provided: Client centered, Solution-focused and CBT    Progress toward short term goals:patient reports no new symptom    Review of long term goals: Treatment Plan updated :4/12/2018 next review:7/12/2018      Diagnosis:   1. Adjustment disorder with mixed anxiety and depressed mood    2. Major depressive disorder, recurrent episode, moderate (H)    No change    Plan and Follow-up:     1.Patient will continue taking her medications as prescribed.  2.  Patient will continue exercising her boundary skills as needed.  3.  Patient will see this therapist in 2 " weeks    Discharge Criteria/Planning: Patient will continue with follow-up until therapy can be discontinued without return of signs and symptoms.    Performed and documented by BECKY Dozier- ANA; [facility] [place] 6/14/2018
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
## 2021-06-18 NOTE — PROGRESS NOTES
"5/24/2018    Start time: 15:06  Stop Time: 16:00  Session # 8    Sabra Leigh is a 33 y.o. female is being seen today for    Chief Complaint   Patient presents with      Follow Up     \"Stress from my sibling\"   .  Follow up in regards to ongoing symptom management of anxiety and depression.     New symptoms or complaints: \" I am angry today, my sister is selfish, she won't help me, she takes but never gives\"    Functional Impairment:   Personal: 4  Family: 3  Social: 3  Work: 4    Clinical assessment of mental status:   Sabra Leigh presented on time.   She was oriented x2, open and cooperative, and dressed appropriately for this session and weather. Her memory was Normal cognitive functioning .  Her speech was  Within normal.  Language was appropriate.  Concentration and focus is Brief. Psychosis is not noted or reported. She reports her mood is Angry and and mad.  Affect is congruent with speech and is Angry, Irritable and Anxious.  Fund of knowledge is adequate. Insight is adequate for therapy.    Suicidal/Homicidal Ideation present: Patient denies suicidal and homicidal ideations/means or plans.     Patient's impression of their current status: Patient presented to labor her 16-year-old daughter today. She is not happy that her sister won't return her any favor. She is not happy that she is giving too much but none are cares. She wants to stop giving her her house key or giving her money when she needs it.  Patient stated she feels angry, sad, mad and disappointed. daughter prompted her to share her frustration but patient but won't talk. She chose to lay on the sit. Daughter was forcing her to speak. She notes patient has been having a bad day due to her sister who would not help when she needs her. She also stated that patient has been doing well except today. Patient later on decided to speak. She then expressed her feelings and indicated she was done with her sister.Daughter will be taking Summer " "classes because she has been failing her classes. Though she still has hope to increase her GPA. Patient also reports no new issue.  She continues to receive her care as planned.     Therapist impression of patients current state: This 33 y.o. Black or  female presented on time with her 16 year old daughter. Her mood was upset and angry. Her affect was fair but her tone was loud and she was easily irritable. Writer pointed out about the fact she has no listed PCP. Patient got loud and shouted that writer did not get it, \" because I only saw my new PCP recently\". When asked whether she has requested to have this provider as her PCP, patient stood up. Writer clarified to the patient that seeing a provider does not make them her PCP unless she request that. Patient said \" true\"  but still appeared upset. Patient was not at her baseline today. As reported by her daughter, this is due to the fact the patient's sister won't return her favor. Writer will review today's session with the patient at the next visit and will discuss healthy coping strategies when someone upset her.      Assessment tools used today include: How do you feel today?    Type of psychotherapeutic technique provided: Client centered and Solution-focused    Progress toward short term goals:patient reports today is not her day, she is not happy, angry and mad about how she is being treated by her sister.     Review of long term goals: Treatment Plan updated: 4/12/2018 next review: 7/12/2018    Diagnosis:   1. Adjustment disorder with mixed anxiety and depressed mood    2. Major depressive disorder, recurrent episode, moderate    No change    Plan and Follow-up:  1. Patient plans to continue taking the medication as prescribed.  2.Patient plans to spend some time with her family on memorial day  3.Patient will address her frustration directly to her sister for an open dialogue around her care  4..Patient will see this writer in 2 " weeks    Discharge Criteria/Planning: Client has chronic symptoms and ongoing therapy for maintenance stability recommended.    [facility]formed and documented [facility]; [facility] 5/24/2018
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
## 2021-06-18 NOTE — PROGRESS NOTES
ASSESSMENT: Onychauxis, type 1 diabetes with neurologic manifestations, foot pain.     PLAN: Trimmed dystrophic nails 1 through 5 both feet today.  I recommended the patient return to the clinic every 3 months for continued diabetic foot care.              SUBJECTIVE: Toenails are elongated and slightly thickened. She continues with dialysis. Vision is significantly impaired.     OBJECTIVE:  General: Pleasant 33 y.o. female in no acute distress.  Vascular: DP pulses are diminished. PT pulses are diminished. Pedal hair is absent. Feet are cool to the touch.  Neuro: Sensation in the feet is absent.  She describes neuropathy up to the knees.  Some altered sensation in the hands as well.  Derm: Toenails are thickened and dystrophic with discoloration and subungual debris. Plantar skin is xerotic, but intact.  Musculoskeletal: Adequate passive range of motion and foot and ankle joints
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
## 2021-06-18 NOTE — PROGRESS NOTES
"[facility]  ENDOCRINOLOGY    Diabetes Note 5/31/2018    Sabra Leigh, 1985, [id number]          Reason for visit      1. Type 1 diabetes mellitus with complication    2. Diabetic peripheral neuropathy associated with type 1 diabetes mellitus        HPI     Sabra Leigh is a very pleasant 33 y.o. old female who presents for follow up.  SUMMARY:  Sully returns today in f/u for DM 1.  She reports that she has just seen her PCP, Rachel Verma, for vaginal candidiasis.  Her A1c has dropped a bit, but is still high and elevated blood sugars are the likely cause.  It is now 9.6 down from 9.9.  She is back on her pump, and has been working with WILTON Ortiz for this.  She was able to get the settings off of her old pump and get things up and running for her.  Sully reports that she had a reservoir message on it yesterday, and nothing was found to be wrong.  She wants us to look at it today and make sure that nothing is going on.  She continues to make convenient, but not good, food choices.  She tells me that all she had all day long the other day was [place] [employer] - about 15 throughout the course of the day.  When she does put in the effort to cook at home, she states that they eat chicken and turkey. Yesterday, she only ate one time. She is testing between 1 and 2 times a day, mostly because of the cost of her test strips.  She has been having some low blood sugars, but also some significant elevations - 538, 576, and one \"Hi\" reading.      Blood glucose data:  62 - 576    Insulin Pump Settings     Basal Rate  TIME Units/HR   0000 - 0600 0.70   0600 - 1230 0.750   1230 - 1500 0.850   1500 - 2100 0.700   2100 - 0000 0.650         Bolus: Insulin : CHO ratio  Time Units Grams CHO   0000 1 10                          Correction  #Units Blood glucose >Target BG   1 10 240         Past Medical History     Patient Active Problem List   Diagnosis     Hypertension     Anemia in CKD (chronic kidney disease)     " "GERD (gastroesophageal reflux disease)     Diabetic peripheral neuropathy associated with type 1 diabetes mellitus     Type 1 diabetes mellitus with complication     ESRD (end stage renal disease) on dialysis- [facility], Sat     RLS (restless legs syndrome)      Retinal detachment     Gastroparesis     SHANNON - shortness of breath with exertion that is limiting.     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     Cerebral atrophy     Morbid obesity with BMI of 40.0-44.9, adult     Cataract     CTS (carpal tunnel syndrome) - both sides - **possible** - NCV does not clearly show this (February, 2017)     Chronic diarrhea     Dysphagia     ROES (obstructive sleep apnea), non-compliant with CPAP. Started using daily since 3/2018     Legally blind        Family History       family history includes Aneurysm in her mother; Asthma in her brother, daughter, sister, and sister; Breast cancer in her maternal aunt and maternal aunt; Depression in her mother; Diabetes in her mother and sister; Hypertension in her mother; Sleep apnea in her brother, mother, sister, and sister; Snoring in her brother, mother, sister, sister, and sister; Stroke in her mother.    Social History      reports that she quit smoking about 20 years ago. Her smoking use included Cigarettes. She has a 0.25 pack-year smoking history. She has never used smokeless tobacco. She reports that she does not drink alcohol or use illicit drugs.      Review of Systems     Patient has no polyuria or polydipsia, no chest pain, dyspnea or TIA's, no numbness, tingling or pain in extremities  Remainder negative except as noted in HPI.    Vital Signs     /84 (Patient Site: Right Arm, Patient Position: Sitting, Cuff Size: Adult Regular)  Pulse 88  Ht 4' 11\" (1.499 m)  Wt 216 lb 8 oz (98.2 kg)  BMI 43.73 kg/m2  Wt Readings from Last 3 Encounters:   05/29/18 216 lb 8 oz (98.2 kg)   05/29/18 219 lb (99.3 kg)   05/25/18 219 lb 12.8 oz " (99.7 kg)       Physical Exam     Constitutional:  Well developed, Well nourished  HENT:  Normocephalic,   Neck: Thyroid normal, No lymph nodes, Supple  Eyes:  PERRL, Conjunctiva pink  Respiratory:  Normal breath sounds, No respiratory distress  Cardiovascular:  Normal heart rate, Normal rhythm, No murmurs  GI:  Bowel sounds normal, Soft, No tenderness  Musculoskeletal:  No gross deformity or lesions, normal dorsalis pedis pulses  Skin: No acanthosis nigricans, lipoatrophy or lipodystrophy  Neurologic:  Alert & oriented x 3, nonfocal  Psychiatric:  Affect, Mood, Insight appropriate  Diabetic foot exam: no ulcers, charcot's or high risk calluses, Unable to feel monofilament at all - neuropathy is extending up lower legs.        Assessment     1. Type 1 diabetes mellitus with complication    2. Diabetic peripheral neuropathy associated with type 1 diabetes mellitus        Plan     Once again, we have discussed her dietary choices.  If she would eat regularly, and not fast food, I think that her numbers would be a whole lot better.  She will continue to use her pump as she has been because she can manage without being able to see well.  I will see her back in 1 month. Time spent with pt today: 25 min with >50% spent in counseling and coordination of care.        Misti WILCOX Endocrinology  5/31/2018  7:17 AM        Lab Results     Hemoglobin A1c   Date Value Ref Range Status   05/22/2018 9.6 (H) 3.5 - 6.0 % Final   02/13/2018 9.9 (H) 3.5 - 6.0 % Final   11/07/2017 8.8 (H) 3.5 - 6.0 % Final   06/23/2017 10.0 (H) 3.5 - 6.0 % Final   02/03/2017 9.1 (H) 3.5 - 6.0 % Final     Creatinine   Date Value Ref Range Status   03/19/2018 8.90 (HH) 0.60 - 1.10 mg/dL Final   03/15/2018 5.62 (H) 0.60 - 1.10 mg/dL Final   09/01/2017 14.58 (HH) 0.60 - 1.10 mg/dL Final     Microalbumin, Random Urine   Date Value Ref Range Status   07/09/2015 499.19 (H) 0.00 - 1.99 mg/dL Final       Cholesterol   Date Value Ref Range Status    04/27/2016 190 <=199 mg/dL Final     HDL Cholesterol   Date Value Ref Range Status   04/27/2016 30 (L) >=50 mg/dL Final     LDL Calculated   Date Value Ref Range Status   04/27/2016  <=129 mg/dL Final     Comment:     Invalid, Triglycerides >400     Triglycerides   Date Value Ref Range Status   04/27/2016 444 (H) <=149 mg/dL Final       Lab Results   Component Value Date    ALT 43 03/19/2018    AST 44 (H) 03/19/2018    ALKPHOS 251 (H) 03/19/2018    BILITOT 0.2 03/19/2018         Current Medications     Outpatient Medications Prior to Visit   Medication Sig Dispense Refill     acetaminophen (TYLENOL) 325 MG tablet Take 650 mg by mouth every 6 (six) hours as needed for pain.       acetone, urine, test (ACETONE, URINE, TEST) Strp Check urine for ketones if blood sugar is above 250.  Call Dr Soliz if ketones are moderate or large. 50 strip 3     albuterol (PROAIR HFA;PROVENTIL HFA;VENTOLIN HFA) 90 mcg/actuation inhaler Inhale 2 puffs every 6 (six) hours as needed for wheezing. 1 each 0     alcohol swabs (ALCOHOL PADS) PadM Apply 1 Units topically daily. 70% 100 each 3     amLODIPine (NORVASC) 5 MG tablet Take 2.5 mg by mouth at bedtime. Indications: hypertension       aspirin 81 MG EC tablet Take 81 mg by mouth daily.       blood glucose test strips One each 4 times per day. Dispense prodigy strips for talking meter 100 each 11     blood-glucose meter,continuous (DEXCOM G5 ) Misc use as directed 1 each 0     blood-glucose sensor (DEXCOM G5-G4 SENSOR) Jie Change every 7 days 4 Device 13     blood-glucose transmitter (DEXCOM G5 TRANSMITTER) Jie Change every 3 months 1 Device 3     calcium acetate (PHOSLO) 667 mg capsule Take 1 capsule (667 mg total) by mouth 3 (three) times a day. (Patient taking differently: Take 667 mg by mouth 3 (three) times a day with meals. ) 90 capsule 11     cholecalciferol, vitamin D3, 1,000 unit tablet Take 1 tablet (1,000 Units total) by mouth daily. (Patient taking differently: Take  "1,000 Units by mouth 4 (four) times a week. On non-dialysis days.) 90 tablet 2     cinacalcet (SENSIPAR) 30 MG tablet Take 30 mg by mouth. M, W, F        CLASSIC PRENATAL 28 mg iron- 800 mcg Tab Take 1 tablet by mouth daily. (Patient taking differently: Take 1 tablet by mouth at bedtime. ) 90 tablet 3     cyclobenzaprine (FLEXERIL) 5 MG tablet Take 2 tablets (10 mg total) by mouth every 8 (eight) hours as needed for muscle spasms. 30 tablet 1     diphenoxylate-atropine (LOMOTIL) 2.5-0.025 mg per tablet Take 1 tablet by mouth 4 (four) times a day as needed for diarrhea. 90 tablet 1     gabapentin (NEURONTIN) 300 MG capsule Take 2 tablets 3 times a day (Patient taking differently: Take 600 mg by mouth 3 (three) times a day. ) 180 capsule 11     glucagon, human recombinant, (GLUCAGON) 1 mg injection Use as directed for hypoglycemia (Patient taking differently: Inject 1 each under the skin. Use as directed for hypoglycemia) 1 each 3     glucose (DEX4 GLUCOSE) 4 GM chewable tablet Chew 16 g as needed for low blood sugar.       insulin aspart (NOVOLOG) 100 unit/mL injection For use with pump up to 75 units daily 10 mL PRN     insulin glargine (LANTUS; BASAGLAR) 100 unit/mL (3 mL) pen Inject 18 units twice daily when insulin pump fails 5 adj dose pen 1     insulin syringe-needle U-100 (BD INSULIN SYRINGE ULTRA-FINE) 0.3 mL 31 x 5/16\" Syrg Test 3 times daily 100 each 3     LANCING DEVICE MISC Use As Directed.       lancing device with lancets Kit Juan contour brand 1 each 0     loperamide (IMODIUM) 2 mg capsule Take 1 capsule (2 mg total) by mouth 4 (four) times a day as needed for diarrhea. 90 capsule 1     losartan (COZAAR) 50 MG tablet Take 1 tablet (50 mg total) by mouth daily. 90 tablet 2     LUBRICATING PLUS 0.5 % Dpet ophthalmic dropperette Apply 1 drop to eye every hour as needed.   6     metoclopramide (REGLAN) 5 MG tablet Take 1 tablet (5 mg total) by mouth 3 (three) times a day before meals. Needs to establish " "care with new provider (Patient taking differently: Take 5 mg by mouth see administration instructions. Every morning and noon on non-dialysis days.) 90 tablet 0     metoprolol tartrate (LOPRESSOR) 50 MG tablet Take 50 mg by mouth see administration instructions. Daily at noon on dialysis days.       omeprazole (PRILOSEC) 20 MG capsule Take 20 mg by mouth daily before breakfast.       rOPINIRole (REQUIP) 0.25 MG tablet Take 0.25 mg by mouth at bedtime.       transparent dressings 2 3/8 X 2 3/4 \" Bndg Apply 1 patch topically every third day. 6 each 11     insulin aspart (NOVOLOG FLEXPEN) 100 unit/mL injection pen Use up to 100 units daily when insulin pump fails 30 mL 5     No facility-administered medications prior to visit.              "

## 2021-06-18 NOTE — PROGRESS NOTES
Internal Medicine Office Visit  [facility]  Patient Name: Sabra Leigh  Patient Age: 33 y.o.  YOB: 1985  MRN: [id number]     Care Team:   1. Dr. Nguyen and Dr. Howe-  Nephrology  2. Endocrinology, Salome Chapa NP      Date of Visit: 2018  Reason for Office Visit:   Chief Complaint   Patient presents with     Establish Care           Assessment / Plan / Medical Decision Makin. Type 1 diabetes mellitus with complication  - Ambulatory referral to Diabetes Education Program (Existing Diagnosis): Needs to have her pump configured to start using this again   - Hemoglobin A1c - future    2. Diabetic peripheral neuropathy associated with type 1 diabetes mellitus  - Continue gabapentin  - Tight glycemic control encouraged     3. Visual impairment due to diabetes mellitus  - currently coping well with in-home assistance, walking stick     4. Gastroparesis  - Continue ondansetron for nausea  - Small meals encouraged     5. Chronic diarrhea  - Need to determine whether she had colonoscopy at next visit- report not in chart  - loperamide (IMODIUM) 2 mg capsule; Take 1 capsule (2 mg total) by mouth 4 (four) times a day as needed for diarrhea.  Dispense: 90 capsule; Refill: 1  - diphenoxylate-atropine (LOMOTIL) 2.5-0.025 mg per tablet; Take 1 tablet by mouth 4 (four) times a day as needed for diarrhea.  Dispense: 90 tablet; Refill: 1    6. Hypertension  - losartan (COZAAR) 50 MG tablet; Take 1 tablet (50 mg total) by mouth daily.  Dispense: 90 tablet; Refill: 2    7. Gastroesophageal reflux disease without esophagitis  - Stable with current omeprazole 20 mg daily     8. RLS (restless legs syndrome)   - Stable with ropinirole, continue     9. ROSE (obstructive sleep apnea), non-compliant with CPAP. Started using daily since 3/2018  - She is encouraged to continue CPAP regularly. Reviewed risks of not treating     10. Morbid obesity with BMI of 40.0-44.9, adult  -  Diet not discussed in detail today. It is clear she could use some encouragement and changes with this aspect of her self-care     11. ESRD (end stage renal disease) on dialysis  - Continue dialysis     13. Dysphagia  - Reviewed records available. It appears that she was referred to have an EGD and esophageal dilatation- I do not see record that she went through with this. She does have reported pill dysphagia. Will review with hear at next visit       Health Maintenance Review  Health Maintenance   Topic Date Due     ADVANCE DIRECTIVES DISCUSSED WITH PATIENT  02/04/2002     DIABETES URINE MICROALBUMIN  07/21/2017     DIABETES FOLLOW-UP  07/09/2018     INFLUENZA VACCINE RULE BASED (Season Ended) 08/01/2018     DEPRESSION FOLLOW UP  09/13/2018     DIABETES HEMOGLOBIN A1C  11/22/2018     DIABETES FOOT EXAM  02/20/2019     PAP SMEAR  12/14/2020     TD 18+ HE  12/18/2024     TDAP ADULT ONE TIME DOSE  Completed         I have discontinued Ms. Leigh's loperamide. I have also changed her diphenoxylate-atropine. Additionally, I am having her start on loperamide. Lastly, I am having her maintain her glucose, LANCING DEVICE MISC, acetone (urine) test, insulin syringe-needle U-100, glucagon (human recombinant), acetaminophen, transparent dressings, alcohol swabs, aspirin, blood glucose test, calcium acetate, LUBRICATING PLUS, omeprazole, cinacalcet, insulin aspart U-100, cholecalciferol (vitamin D3), CLASSIC PRENATAL, gabapentin, insulin aspart U-100, metoclopramide, lancing device with lancets, rOPINIRole, metoprolol tartrate, amLODIPine, insulin glargine, and losartan.      HPI:  Sabra Leigh is a 33 y.o. year old who presents to the office today to establish care. She is alone at today's appointment.     She has a history of ESRD secondary to presumptive DM nephropathy with dialysis on Tues, Thur, Saturday since 2014. She was seeing transplant through the [facility] but was told that she needed to lose weight to qualify for  "dialysis. She has not followed up regarding this.     DM-1 diagnosed around age 14. 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 Sees shadows and black and white. States her insulin pump is not working, stopped 2 months ago. She got a new pump about 1 week ago. Currently using insulin injections. Her readings have been below 300, she is proud of herself for this. Fasting readigns range from the 100s to 200s. She had 2-3 low readings in the 60s. She is trying to lose weight on her own. Used to eat fries daily, no limits herself to once per week. Appears that her weight has been stable without weight loss over the past 6 months.      RLS- treated with ropinirole. This is effective for her.     GERD- takes omeprazole, no breakthrough reflux.     Nausea- she takes metoclopramide as needed for nausea two-three times per day per day. Pills feel like they get stuck when she swallows. She vomits without this medication. Chart reviewed, it appears that she was evaluated for dysphagia at [facility] 10/2017 and an EGD with dilatation was recommended, I do not see records for whether she went through with this. She does have a history of gastroparesis as well.     Chronic diarrhea- takes an antidiarrheal capsule first. If this is not effective then she takes the tablets. Might take up to 4 capsules, up to 2 tablets. [facility] notes reviewed, appears that she had stool tests, H. Pylori, TSH checked with normal results. Upper EGD was positive for H. Pylori which was treated but no other abnormalities. She was referred to have a colonoscopy, there is no report that this was done.     Neuropathy- takes 2 tablets three times a day for gabapentin.     States that she was going to \"[employer] School\" but stopped.     Takes a [employer]. Has a visiting nurse who comes once per week who helps with getting her mail, helps with medications, and checks her blood pressure.     Review of Systems- pertinent positive in " bold:  Constitutional: Fever, chills, night sweats, fainting, weight change, fatigue, seizures, dizziness, sleeping difficulties, loud snoring/pauses in breathing  Eyes: change in vision, blurred or double vision, redness/eye pain  Ears, nose, mouth, throat: change in hearing, ear pain, hoarseness, difficulty swallowing, sores in the mouth or throat  Respiratory: shortness of breath, cough, bloody sputum, wheezing  Cardiovascular: chest pain, palpitations   Gastrointestinal: abdominal pain, heartburn/indigestion, nausea/vomiting, change in appetite, change in bowel habits, constipation or diarrhea, rectal bleeding/dark stools, difficulty swallowing  Urinary: painful urination, frequent urination, urinary urgency/incontinence, blood in urine/dark urine, nocturia  Genital: WOMEN: vaginal discharge or odor, bleeding/pain with intercourse, pelvic pain, vulvar/vaginal itching or burning, excessive menstrual bleeding, problems with sexual function  Musculoskeletal: backache/back pain (new or increasing), weakness, joint pain/stiffness (new or increasing), muscle cramps, swelling of hands, feet, ankles, leg pain/redness  Skin: change in moles/freckles, rash, nodules  Hematologic/lymphatic: swollen lymph glands, abnormal bruising/bleeding  Endocrine: excessive thirst/urination, cold or heat intolerance  Breast: breast lump, breast pain, nipple discharge/skin changes  Neurologic/emotional: worrisome memory change, numbness/tingling, anxiety, mood swings      Current Scheduled Meds:  Outpatient Encounter Prescriptions as of 5/22/2018   Medication Sig Dispense Refill     acetaminophen (TYLENOL) 325 MG tablet Take 650 mg by mouth every 6 (six) hours as needed for pain.       acetone, urine, test (ACETONE, URINE, TEST) Strp Check urine for ketones if blood sugar is above 250.  Call Dr Soliz if ketones are moderate or large. 50 strip 3     alcohol swabs (ALCOHOL PADS) PadM Apply 1 Units topically daily. 70% 100 each 3      "amLODIPine (NORVASC) 5 MG tablet Take 2.5 mg by mouth at bedtime. Indications: hypertension       aspirin 81 MG EC tablet Take 81 mg by mouth daily.       blood glucose test strips One each 4 times per day. Dispense prodigy strips for talking meter 100 each 11     calcium acetate (PHOSLO) 667 mg capsule Take 1 capsule (667 mg total) by mouth 3 (three) times a day. (Patient taking differently: Take 667 mg by mouth 3 (three) times a day with meals. ) 90 capsule 11     cholecalciferol, vitamin D3, 1,000 unit tablet Take 1 tablet (1,000 Units total) by mouth daily. (Patient taking differently: Take 1,000 Units by mouth 4 (four) times a week. On non-dialysis days.) 90 tablet 2     cinacalcet (SENSIPAR) 30 MG tablet Take 30 mg by mouth. M, W, F        CLASSIC PRENATAL 28 mg iron- 800 mcg Tab Take 1 tablet by mouth daily. (Patient taking differently: Take 1 tablet by mouth at bedtime. ) 90 tablet 3     diphenoxylate-atropine (LOMOTIL) 2.5-0.025 mg per tablet Take 1 tablet by mouth 4 (four) times a day as needed for diarrhea. 90 tablet 1     gabapentin (NEURONTIN) 300 MG capsule Take 2 tablets 3 times a day (Patient taking differently: Take 600 mg by mouth 3 (three) times a day. ) 180 capsule 11     glucagon, human recombinant, (GLUCAGON) 1 mg injection Use as directed for hypoglycemia (Patient taking differently: Inject 1 each under the skin. Use as directed for hypoglycemia) 1 each 3     glucose (DEX4 GLUCOSE) 4 GM chewable tablet Chew 16 g as needed for low blood sugar.       insulin aspart (NOVOLOG FLEXPEN) 100 unit/mL injection pen Use up to 100 units daily when insulin pump fails 30 mL 5     insulin aspart (NOVOLOG) 100 unit/mL injection For use with pump up to 75 units daily 10 mL PRN     insulin glargine (LANTUS; BASAGLAR) 100 unit/mL (3 mL) pen Inject 18 units twice daily when insulin pump fails 5 adj dose pen 1     insulin syringe-needle U-100 (BD INSULIN SYRINGE ULTRA-FINE) 0.3 mL 31 x 5/16\" Syrg Test 3 times " "daily 100 each 3     LANCING DEVICE MISC Use As Directed.       lancing device with lancets Kit Juan contour brand 1 each 0     losartan (COZAAR) 50 MG tablet Take 1 tablet (50 mg total) by mouth daily. 90 tablet 2     LUBRICATING PLUS 0.5 % Dpet ophthalmic dropperette Apply 1 drop to eye every hour as needed.   6     metoclopramide (REGLAN) 5 MG tablet Take 1 tablet (5 mg total) by mouth 3 (three) times a day before meals. Needs to establish care with new provider (Patient taking differently: Take 5 mg by mouth see administration instructions. Every morning and noon on non-dialysis days.) 90 tablet 0     metoprolol tartrate (LOPRESSOR) 50 MG tablet Take 50 mg by mouth see administration instructions. Daily at noon on dialysis days.       omeprazole (PRILOSEC) 20 MG capsule Take 20 mg by mouth daily before breakfast.       rOPINIRole (REQUIP) 0.25 MG tablet Take 0.25 mg by mouth at bedtime.       transparent dressings 2 3/8 X 2 3/4 \" Bndg Apply 1 patch topically every third day. 6 each 11     [DISCONTINUED] diphenoxylate-atropine (LOMOTIL) 2.5-0.025 mg per tablet TAKE ONE TABLET BY MOUTH FOUR TIMES DAILY AS NEEDED FOR DIARRHEA 20 tablet 4     [DISCONTINUED] loperamide (IMODIUM A-D) 2 mg tablet Take 1 tablet (2 mg total) by mouth 3 (three) times a day as needed for diarrhea.  0     [DISCONTINUED] losartan (COZAAR) 50 MG tablet Take 1 tablet (50 mg total) by mouth daily. 30 tablet 0     loperamide (IMODIUM) 2 mg capsule Take 1 capsule (2 mg total) by mouth 4 (four) times a day as needed for diarrhea. 90 capsule 1     No facility-administered encounter medications on file as of 5/22/2018.      Past Medical History:   Diagnosis Date     Blind     blind in right eye and limited vision in left eye     CAP (community acquired pneumonia) 3/15/2018     DM type 1 (diabetes mellitus, type 1)     insulin pump     ESRD (end stage renal disease) on dialysis      Gastroparesis 7/9/2015     GERD (gastroesophageal reflux disease)  "     History of vitrectomy     Both eyes     HTN (hypertension)      ROSE (obstructive sleep apnea)      PN (peripheral neuropathy)      Presence of insulin pump      Retinal detachment      RLS (restless legs syndrome)      Sebaceous cyst of ear 3/30/2016     Past Surgical History:   Procedure Laterality Date     APPENDECTOMY  age 21     AV FISTULA PLACEMENT Left      CATARACT EXTRACTION, BILATERAL       COLONOSCOPY N/A 12/11/2017    Procedure: COLONOSCOPY with biopsy;  Surgeon: Lito Green MD;  Location: [facility];  Service:      ESOPHAGOGASTRODUODENOSCOPY N/A 12/11/2017    Procedure: ESOPHAGOGASTRODUODENOSCOPY (EGD) with biopsy;  Surgeon: Lito Green MD;  Location: [facility];  Service:      EYE SURGERY Bilateral 2010    cataract     Social History   Substance Use Topics     Smoking status: Former Smoker     Packs/day: 0.50     Years: 0.50     Types: Cigarettes     Quit date: 1/1/1998     Smokeless tobacco: Never Used      Comment: She smoked for 6 months as a teenager.     Alcohol use No       Objective / Physical Examination:  Vitals:    05/22/18 1253   BP: 122/62   Pulse: 80   Weight: 215 lb (97.5 kg)     Wt Readings from Last 3 Encounters:   05/22/18 215 lb (97.5 kg)   05/08/18 217 lb 3.2 oz (98.5 kg)   05/03/18 216 lb (98 kg)     Body mass index is 43.42 kg/(m^2).     General Appearance: Alert and oriented, cooperative, affect appropriate, speech clear, in no apparent distress  Lungs: Clear to auscultation bilaterally. Normal inspiratory and expiratory effort  Cardiovascular: Regular rate, normal S1, S2. No murmurs, rubs, or gallops  Abdomen: Bowel sounds active all four quadrants. Soft, non-tender  Extremities: Pulses 2+ and equal throughout. No edema      Orders Placed This Encounter   Procedures     Ambulatory referral to Diabetes Education Program (Existing Diagnosis)   Followup: Return in about 3 months (around 8/22/2018) for Next scheduled follow up. earlier if needed.    Total  time spent with patient was 45 minutes with >50% of time spent in face-to-face counseling regarding the above plan and rest of the time spent in chart review       Rachel Verma CNP

## 2021-06-19 NOTE — PROGRESS NOTES
"[facility]  ENDOCRINOLOGY    Diabetes Note 7/25/2018    Sabra Leigh, 1985, [id number]          Reason for visit      1. Uncontrolled type 1 diabetes mellitus with chronic kidney disease on chronic dialysis (H)    2. 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 Diabetic peripheral neuropathy associated with type 1 diabetes mellitus (H)        HPI     Sabra Leigh is a very pleasant 33 y.o. old female who presents for follow up.  SUMMARY:  Sully returns today in f/u for DM 1 with CKD,visual impairment and peripheral neuropathy. She has just been in hospital again 2/2 to her pump becoming unhooked and resulting hyperglycemia and hypoxia. She states that she knew that it had come off, but that she didn't want to bother her daughter, who was asleep and had school the next day.  Her daughter, who is 17 provides care for her much of the time. Her blood sugars were between 350 and 554 that day, and all registered \"Hi\" the day before, so the site had likely was bad for almost 48 hours. She currently has an eye infection, and that may be contributory.   She again tells me that she needs to go on a diet. She reports that she is trying to stay away from Fast Food. This is an ongoing discourse between us.  She heard it from her PCP today also, who is referring her to a nutritionist.  FF is often the choice because it is easy and it is difficult for Sully to cook.  She is also having difficulty finding things that are OK to eat on both a dialysis and diabetes diet. Her current A1c is 9.6, which is down from 9.9.        Blood glucose data:  109 - Hi      Past Medical History     Patient Active Problem List   Diagnosis     Hypertension     Anemia in CKD (chronic kidney disease)     GERD (gastroesophageal reflux disease)     Diabetic peripheral neuropathy associated with type 1 diabetes mellitus (H)     Uncontrolled type 1 diabetes mellitus with chronic kidney disease on chronic dialysis (H)     ESRD (end stage " "renal disease) on dialysis- Tue, [place], Sat     RLS (restless legs syndrome)      Retinal detachment     Gastroparesis     SHANNON - shortness of breath with exertion that is limiting.     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 Started using daily since 3/2018     Legally blind     Hyperglycemia     Hypoxia        Family History       family history includes Aneurysm in her mother; Asthma in her brother, daughter, sister, and sister; Breast cancer in her maternal aunt and maternal aunt; Depression in her mother; Diabetes in her mother and sister; Hypertension in her mother; Sleep apnea in her brother, mother, sister, and sister; Snoring in her brother, mother, sister, sister, and sister; Stroke in her mother.    Social History      reports that she quit smoking about 20 years ago. Her smoking use included Cigarettes. She has a 0.25 pack-year smoking history. She has never used smokeless tobacco. She reports that she does not drink alcohol or use illicit drugs.      Review of Systems     Patient has no polyuria or polydipsia, no chest pain, dyspnea or TIA's, no numbness, tingling or pain in extremities  Remainder negative except as noted in HPI.    Vital Signs     /74 (Patient Site: Right Arm, Patient Position: Sitting, Cuff Size: Adult Regular)  Pulse 90  Ht 4' 11\" (1.499 m)  Wt (!) 222 lb 8 oz (100.9 kg)  BMI 44.94 kg/m2  Wt Readings from Last 3 Encounters:   07/24/18 (!) 222 lb 8 oz (100.9 kg)   07/24/18 222 lb (100.7 kg)   07/13/18 217 lb 9.6 oz (98.7 kg)       Physical Exam     Constitutional:  Well developed, Well nourished  HENT:  Normocephalic,   Neck: Thyroid normal, No " lymph nodes, Supple  Eyes:  PERRL, Conjunctiva pink  Respiratory:  Normal breath sounds, No respiratory distress  Cardiovascular:  Normal heart rate, Normal rhythm, No murmurs  GI:  Bowel sounds normal, Soft, No tenderness  Musculoskeletal:  No gross deformity or lesions, normal dorsalis pedis pulses  Skin: No acanthosis nigricans, lipoatrophy or lipodystrophy      Assessment     1. Uncontrolled type 1 diabetes mellitus with chronic kidney disease on chronic dialysis (H)    2. 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 Diabetic peripheral neuropathy associated with type 1 diabetes mellitus (H)        Plan     We discussed her dietary choices, again. She needs to eat in the morning before she goes to dialysis, but doesn't.  Long ago, she and Krystyna would make little sack lunches to go with her but that hasn't been done in a long time.  She needs to get out and walk, but that is dependent upon who is available to walk with her. She has a stationary bicycle that she has not been using, and states that she will be getting back on it. Hopefully.  She will f/u with me in 1 month. Time spent with pt today: 25 min with >50% spent in counseling and coordination of care.        Misti WILCOX Endocrinology  7/25/2018  10:07 AM        Lab Results     Hemoglobin A1c   Date Value Ref Range Status   05/22/2018 9.6 (H) 3.5 - 6.0 % Final   02/13/2018 9.9 (H) 3.5 - 6.0 % Final   11/07/2017 8.8 (H) 3.5 - 6.0 % Final   06/23/2017 10.0 (H) 3.5 - 6.0 % Final   02/03/2017 9.1 (H) 3.5 - 6.0 % Final     Creatinine   Date Value Ref Range Status   07/13/2018 7.46 (HH) 0.60 - 1.10 mg/dL Final   07/12/2018 11.71 (HH) 0.60 - 1.10 mg/dL Final   07/12/2018 11.37 (HH) 0.60 - 1.10 mg/dL Final     Microalbumin, Random Urine   Date Value Ref Range Status   07/09/2015 499.19 (H) 0.00 - 1.99 mg/dL Final       Cholesterol   Date Value Ref Range Status   04/27/2016 190 <=199 mg/dL Final     HDL Cholesterol   Date Value Ref Range Status    04/27/2016 30 (L) >=50 mg/dL Final     LDL Calculated   Date Value Ref Range Status   04/27/2016  <=129 mg/dL Final     Comment:     Invalid, Triglycerides >400     Triglycerides   Date Value Ref Range Status   04/27/2016 444 (H) <=149 mg/dL Final       Lab Results   Component Value Date    ALT 43 03/19/2018    AST 44 (H) 03/19/2018    ALKPHOS 251 (H) 03/19/2018    BILITOT 0.2 03/19/2018         Current Medications     Outpatient Medications Prior to Visit   Medication Sig Dispense Refill     acetaminophen (TYLENOL) 325 MG tablet Take 650 mg by mouth every 6 (six) hours as needed for pain.       acetone, urine, test (ACETONE, URINE, TEST) Strp Check urine for ketones if blood sugar is above 250.  Call Dr Soliz if ketones are moderate or large. 50 strip 3     albuterol (PROAIR HFA;PROVENTIL HFA;VENTOLIN HFA) 90 mcg/actuation inhaler Inhale 2 puffs every 6 (six) hours as needed for wheezing. 1 each 0     alcohol swabs (ALCOHOL PADS) PadM Apply 1 Units topically daily. 70% 100 each 3     amLODIPine (NORVASC) 5 MG tablet Take 2.5 mg by mouth at bedtime. Indications: hypertension       aspirin 81 MG EC tablet Take 81 mg by mouth daily.       blood glucose test strips One each 4 times per day. Dispense prodigy strips for talking meter 100 each 11     blood-glucose meter,continuous (DEXCOM G5 ) Misc use as directed 1 each 0     blood-glucose sensor (DEXCOM G5-G4 SENSOR) Jie Change every 7 days 4 Device 13     blood-glucose transmitter (DEXCOM G5 TRANSMITTER) Jie Change every 3 months 1 Device 3     calcium acetate (PHOSLO) 667 mg capsule Take 1,334 mg by mouth daily as needed (With snack).       cholecalciferol, vitamin D3, 1,000 unit tablet Take 1,000 Units by mouth 4 (four) times a week. On non-dialysis days       cinacalcet (SENSIPAR) 30 MG tablet Take 30 mg by mouth 3 (three) times a week. M, W, F       CLASSIC PRENATAL 28 mg iron- 800 mcg Tab Take 1 tablet by mouth daily. 90 tablet 3     cyclobenzaprine  "(FLEXERIL) 5 MG tablet Take 2 tablets (10 mg total) by mouth every 8 (eight) hours as needed for muscle spasms. 30 tablet 1     diphenoxylate-atropine (LOMOTIL) 2.5-0.025 mg per tablet Take 1 tablet by mouth 4 (four) times a day as needed for diarrhea. 90 tablet 1     gabapentin (NEURONTIN) 300 MG capsule Take 600 mg by mouth 3 (three) times a day.       glucagon, human recombinant, (GLUCAGON) 1 mg injection Use as directed for hypoglycemia (Patient taking differently: Inject 1 each under the skin. Use as directed for hypoglycemia) 1 each 3     glucose (DEX4 GLUCOSE) 4 GM chewable tablet Chew 16 g as needed for low blood sugar.       insulin aspart U-100 (NOVOLOG U-100 INSULIN ASPART) 100 unit/mL injection Use daily in pump up to 100 units daily 10 mL PRN     insulin glargine (LANTUS; BASAGLAR) 100 unit/mL (3 mL) pen Inject 28 Units under the skin at bedtime. when insulin pump fails 15 mL 0     insulin syringe-needle U-100 (BD INSULIN SYRINGE ULTRA-FINE) 0.3 mL 31 x 5/16\" Syrg Test 3 times daily 100 each 3     lancing device with lancets Kit [employer] contour brand 1 each 0     lidocaine-prilocaine (EMLA) cream Apply 1 application topically as needed.       loperamide (IMODIUM) 2 mg capsule Take 1 capsule (2 mg total) by mouth 4 (four) times a day as needed for diarrhea. 90 capsule 1     losartan (COZAAR) 50 MG tablet Take 1 tablet (50 mg total) by mouth daily. 90 tablet 2     LUBRICATING PLUS 0.5 % Dpet ophthalmic dropperette Apply 1 drop to eye every hour as needed.   6     metoclopramide (REGLAN) 5 MG tablet TAKE 1 PILL BY MOUTH 3 TIMES DAILY BEFORE MEALS 180 tablet 0     metoprolol tartrate (LOPRESSOR) 50 MG tablet Take 1 tablet (50 mg total) by mouth 2 (two) times a day. 180 tablet 1     NOVOLOG FLEXPEN U-100 INSULIN 100 unit/mL injection pen 0.65 Type 1 with hyperglycemia  Flex Pen Needles - BD Ultra-fine Zo Pen Needles - NDC [address]-96 - dispense 1 case  No supplies needed 15 mL 0     omeprazole (PRILOSEC) " "20 MG capsule Take 20 mg by mouth daily before breakfast.       rOPINIRole (REQUIP) 0.25 MG tablet Take 0.25 mg by mouth at bedtime.       transparent dressings 2 3/8 X 2 3/4 \" Bndg Apply 1 patch topically every third day. 6 each 11     calcium acetate (PHOSLO) 667 mg capsule Take 1 capsule (667 mg total) by mouth 3 (three) times a day. 90 capsule 11     loperamide (IMODIUM) 2 mg capsule Take 1 capsule (2 mg total) by mouth 4 (four) times a day as needed for diarrhea. 90 capsule 1     No facility-administered medications prior to visit.            "

## 2021-06-19 NOTE — PROGRESS NOTES
Patient missed her appointment today.  Has left a message that she had family emergency. Writer made attempt to reach the patient for a follow up but did not reach her. The phone seemed busy.   Performed and documented by [facility]; [facility] 6/28/2018

## 2021-06-19 NOTE — PROGRESS NOTES
"[facility]  ENDOCRINOLOGY    Diabetes Note 6/29/2018    Sabra Leigh, 1985, [id number]          Reason for visit      1. Type 1 diabetes mellitus with complication (H)    2. ESRD (end stage renal disease) on dialysis- Tue, Thur, Sat    3. Insulin pump status        HPI     Sabra Leigh is a very pleasant 33 y.o. old female who presents for follow up.  SUMMARY:  Sully returns today in f/u for DM 1.  She is here with her daughter, who provided transportation.  Sully continues to use an insulin pump for her insulin administration.  This allows her to be independent in her administration.  She cannot see well enough to use insulin pens or manipulate the needles.  Someone has to put her admin set on for her pump. She continues to use a Prodigy meter because it has an audio feature.  She tries to test twice a day, but this depends upon her ability to purchase test strips.  Her download shows everything from 60 to \"hi.\"  She is not proactive about her eating and consequently she often eats fast food. She had [employer] today after dialysis and yesterday it was the [employer]. She notes that she was at her brother's house over the weekend and ate a lot of meat. Her daughter piped up that she was also drinking regular pop.  When asked why, she stated that it was all they had.  She will not drink tap water.  She has not been to school for four months because she needs to be able to walk 2 blocks without stopping and she cannot do this at present. She did see her Opthamologist yesterday and he is happy with her eyes at present.         Blood glucose data:  60 - HI    Insulin Pump Settings     Basal Rate  TIME Units/HR   0000 - 0600 0.70   0600 - 1230 0.750   1230 - 1500 0.850   1500 - 2100 0.700   2100 - 0000 0.650         Bolus: Insulin : CHO ratio  Time Units Grams CHO   0000 1 10                                      Correction  #Units Blood glucose >Target BG   1 10 240         Past Medical History     Patient " "Active Problem List   Diagnosis     Hypertension     Anemia in CKD (chronic kidney disease)     GERD (gastroesophageal reflux disease)     Diabetic peripheral neuropathy associated with type 1 diabetes mellitus (H)     Type 1 diabetes mellitus with complication (H)     ESRD (end stage renal disease) on dialysis- Tue, Thur, Sat     RLS (restless legs syndrome)      Retinal detachment     Gastroparesis     SHANNON - shortness of breath with exertion that is limiting.     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 Started using daily since 3/2018     Legally blind        Family History       family history includes Aneurysm in her mother; Asthma in her brother, daughter, sister, and sister; Breast cancer in her maternal aunt and maternal aunt; Depression in her mother; Diabetes in her mother and sister; Hypertension in her mother; Sleep apnea in her brother, mother, sister, and sister; Snoring in her brother, mother, sister, sister, and sister; Stroke in her mother.    Social History      reports that she quit smoking about 20 years ago. Her smoking use included Cigarettes. She has a 0.25 pack-year smoking history. She has never used smokeless tobacco. She reports that she does not drink alcohol or use illicit drugs.      Review of Systems     Patient has no polyuria or polydipsia, no chest pain, dyspnea or TIA's, no numbness, tingling or pain in extremities  Remainder negative except as noted in HPI.    Vital Signs     /78 (Patient Site: Right Arm, Patient Position: Sitting, Cuff Size: Adult Regular)  Pulse 80  Ht 4' 11\" (1.499 m)  Wt 219 lb 8 oz (99.6 kg)  BMI 44.33 kg/m2  Wt Readings " from Last 3 Encounters:   06/26/18 219 lb 8 oz (99.6 kg)   06/01/18 217 lb (98.4 kg)   05/29/18 216 lb 8 oz (98.2 kg)       Physical Exam     Constitutional:  Well developed, Well nourished  HENT:  Normocephalic,   Neck: Thyroid normal, No lymph nodes, Supple  Eyes:  PERRL, Conjunctiva pink  Respiratory:  Normal breath sounds, No respiratory distress  Cardiovascular:  Normal heart rate, Normal rhythm, No murmurs  GI:  Bowel sounds normal, Soft, No tenderness  Musculoskeletal:  No gross deformity or lesions, normal dorsalis pedis pulses  Skin: No acanthosis nigricans, lipoatrophy or lipodystrophy  Neurologic:  Alert & oriented x 3, nonfocal  Psychiatric:  Affect, Mood, Insight appropriate  Diabetic foot exam: no ulcers, charcot's or high risk calluses, improving skin    Assessment     1. Type 1 diabetes mellitus with complication (H)    2. ESRD (end stage renal disease) on dialysis- [facility], Sat    3. Insulin pump status        Plan     Sully continues to be her own worst enemy.  Discussed better food choices and not just deferring to what is easiest.  More water, less pop. Encouraged to work on her walking so she can go back to school for OT. She will f/u in 1 month. Time spent with pt today: 25 min with >50% spent in counseling and coordination of care.          Misti WILCOX Endocrinology  6/29/2018  6:28 AM          Lab Results     Hemoglobin A1c   Date Value Ref Range Status   05/22/2018 9.6 (H) 3.5 - 6.0 % Final   02/13/2018 9.9 (H) 3.5 - 6.0 % Final   11/07/2017 8.8 (H) 3.5 - 6.0 % Final   06/23/2017 10.0 (H) 3.5 - 6.0 % Final   02/03/2017 9.1 (H) 3.5 - 6.0 % Final     Creatinine   Date Value Ref Range Status   03/19/2018 8.90 (HH) 0.60 - 1.10 mg/dL Final   03/15/2018 5.62 (H) 0.60 - 1.10 mg/dL Final   09/01/2017 14.58 (HH) 0.60 - 1.10 mg/dL Final     Microalbumin, Random Urine   Date Value Ref Range Status   07/09/2015 499.19 (H) 0.00 - 1.99 mg/dL Final       Cholesterol   Date Value Ref Range Status    04/27/2016 190 <=199 mg/dL Final     HDL Cholesterol   Date Value Ref Range Status   04/27/2016 30 (L) >=50 mg/dL Final     LDL Calculated   Date Value Ref Range Status   04/27/2016  <=129 mg/dL Final     Comment:     Invalid, Triglycerides >400     Triglycerides   Date Value Ref Range Status   04/27/2016 444 (H) <=149 mg/dL Final       Lab Results   Component Value Date    ALT 43 03/19/2018    AST 44 (H) 03/19/2018    ALKPHOS 251 (H) 03/19/2018    BILITOT 0.2 03/19/2018         Current Medications     Outpatient Medications Prior to Visit   Medication Sig Dispense Refill     acetaminophen (TYLENOL) 325 MG tablet Take 650 mg by mouth every 6 (six) hours as needed for pain.       acetone, urine, test (ACETONE, URINE, TEST) Strp Check urine for ketones if blood sugar is above 250.  Call Dr Soilz if ketones are moderate or large. 50 strip 3     albuterol (PROAIR HFA;PROVENTIL HFA;VENTOLIN HFA) 90 mcg/actuation inhaler Inhale 2 puffs every 6 (six) hours as needed for wheezing. 1 each 0     alcohol swabs (ALCOHOL PADS) PadM Apply 1 Units topically daily. 70% 100 each 3     amLODIPine (NORVASC) 5 MG tablet Take 2.5 mg by mouth at bedtime. Indications: hypertension       aspirin 81 MG EC tablet Take 81 mg by mouth daily.       blood glucose test strips One each 4 times per day. Dispense prodigy strips for talking meter 100 each 11     blood-glucose meter,continuous (DEXCOM G5 ) Misc use as directed 1 each 0     blood-glucose sensor (DEXCOM G5-G4 SENSOR) Jie Change every 7 days 4 Device 13     blood-glucose transmitter (DEXCOM G5 TRANSMITTER) Jie Change every 3 months 1 Device 3     calcium acetate (PHOSLO) 667 mg capsule Take 1 capsule (667 mg total) by mouth 3 (three) times a day. (Patient taking differently: Take 667 mg by mouth 3 (three) times a day with meals. ) 90 capsule 11     cholecalciferol, vitamin D3, 1,000 unit tablet Take 1 tablet (1,000 Units total) by mouth daily. (Patient taking differently: Take  "1,000 Units by mouth 4 (four) times a week. On non-dialysis days.) 90 tablet 2     cinacalcet (SENSIPAR) 30 MG tablet Take 30 mg by mouth. M, W, F        CLASSIC PRENATAL 28 mg iron- 800 mcg Tab Take 1 tablet by mouth daily. (Patient taking differently: Take 1 tablet by mouth at bedtime. ) 90 tablet 3     cyclobenzaprine (FLEXERIL) 5 MG tablet Take 2 tablets (10 mg total) by mouth every 8 (eight) hours as needed for muscle spasms. 30 tablet 1     diphenoxylate-atropine (LOMOTIL) 2.5-0.025 mg per tablet Take 1 tablet by mouth 4 (four) times a day as needed for diarrhea. 90 tablet 1     gabapentin (NEURONTIN) 300 MG capsule Take 2 tablets 3 times a day (Patient taking differently: Take 600 mg by mouth 3 (three) times a day. ) 180 capsule 11     glucagon, human recombinant, (GLUCAGON) 1 mg injection Use as directed for hypoglycemia (Patient taking differently: Inject 1 each under the skin. Use as directed for hypoglycemia) 1 each 3     glucose (DEX4 GLUCOSE) 4 GM chewable tablet Chew 16 g as needed for low blood sugar.       HYDROcodone-acetaminophen 5-325 mg per tablet Take 1 tablet by mouth every 6 (six) hours as needed for pain. 10 tablet 0     insulin glargine (LANTUS; BASAGLAR) 100 unit/mL (3 mL) pen Inject 18 units twice daily when insulin pump fails 5 adj dose pen 1     insulin syringe-needle U-100 (BD INSULIN SYRINGE ULTRA-FINE) 0.3 mL 31 x 5/16\" Syrg Test 3 times daily 100 each 3     lancing device with lancets Kit [employer] contour brand 1 each 0     loperamide (IMODIUM) 2 mg capsule Take 1 capsule (2 mg total) by mouth 4 (four) times a day as needed for diarrhea. 90 capsule 1     losartan (COZAAR) 50 MG tablet Take 1 tablet (50 mg total) by mouth daily. 90 tablet 2     LUBRICATING PLUS 0.5 % Dpet ophthalmic dropperette Apply 1 drop to eye every hour as needed.   6     metoclopramide (REGLAN) 5 MG tablet Take 1 tablet (5 mg total) by mouth 3 (three) times a day before meals. Needs to establish care with new " "provider (Patient taking differently: Take 5 mg by mouth see administration instructions. Every morning and noon on non-dialysis days.) 90 tablet 0     metoprolol tartrate (LOPRESSOR) 50 MG tablet Take 1 tablet (50 mg total) by mouth 2 (two) times a day. 180 tablet 1     omeprazole (PRILOSEC) 20 MG capsule Take 20 mg by mouth daily before breakfast.       rOPINIRole (REQUIP) 0.25 MG tablet Take 0.25 mg by mouth at bedtime.       transparent dressings 2 3/8 X 2 3/4 \" Bndg Apply 1 patch topically every third day. 6 each 11     LANCING DEVICE MISC Use As Directed.       No facility-administered medications prior to visit.            "
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
## 2021-06-19 NOTE — PROGRESS NOTES
Internal Medicine Office Visit  [facility]  Patient Name: Sabra Leigh  Patient Age: 33 y.o.  YOB: 1985  MRN: [id number]    Date of Visit: 2018  Reason for Office Visit:   Chief Complaint   Patient presents with     Follow-up           Assessment / Plan / Medical Decision Makin. Chronic diarrhea  - loperamide (IMODIUM) 2 mg capsule; Take 1 capsule (2 mg total) by mouth 4 (four) times a day as needed for diarrhea.  Dispense: 90 capsule; Refill: 1    2. Uncontrolled type 1 diabetes mellitus with chronic kidney disease on chronic dialysis (H)  3. Diabetic peripheral neuropathy associated with type 1 diabetes mellitus (H)  4. Insulin pump status  5. 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 Morbid obesity with BMI of 40.0-44.9, adult (H)  - Ambulatory referral to Nutrition Services for help with both a renal and diabetes diet  - We had a pedro discussion of her risk of further future microvascular complications of diabetes. She states that she knows she should take better care of her health for her sake rather than for her daughter's sake which has been an ineffective mind set for her to adopt       Health Maintenance Review  Health Maintenance   Topic Date Due     ADVANCE DIRECTIVES DISCUSSED WITH PATIENT  2002     DIABETES URINE MICROALBUMIN  2017     INFLUENZA VACCINE RULE BASED (1) 2018     DEPRESSION FOLLOW UP  2018     DIABETES HEMOGLOBIN A1C  2018     DIABETES FOLLOW-UP  2018     DIABETES FOOT EXAM  2019     PAP SMEAR  2020     TD 18+ HE  2024     TDAP ADULT ONE TIME DOSE  Completed         I am having Ms. Leigh maintain her glucose, acetone (urine) test, insulin syringe-needle U-100, glucagon (human recombinant), acetaminophen, transparent dressings, alcohol swabs, aspirin, blood glucose test, LUBRICATING PLUS, omeprazole, cinacalcet, CLASSIC PRENATAL, lancing device with lancets,  rOPINIRole, amLODIPine, losartan, diphenoxylate-atropine, (blood-glucose meter,continuous), blood-glucose transmitter, blood-glucose sensor, cyclobenzaprine, albuterol, metoprolol tartrate, insulin aspart U-100, calcium acetate, cholecalciferol (vitamin D3), lidocaine-prilocaine, gabapentin, insulin glargine, NovoLOG Flexpen U-100 Insulin, metoclopramide, and loperamide.      HPI:  Sabra Leigh is a 33 y.o. year old who presents to the office today for follow up of a recent hospitalization for uncontrolled diabetes.     Here today with her daughter Dvein. She was seen in the hospital for uncontrolled diabetes due to the insulin pump falling off due to running out of a patch that she places over the insulin pump. She is still waiting for her these patches to be delivered but has found a way to keep the pump in place. She has been eating at home more often recently but has been eating things like ayan and marinara plus pasta. She is also upset that she cannot eat salads with dressing because of the sugar content of the dressing. Asks what she is supposed to eat.     She was hypoxic in the hospital. No shortness of breath today. Oxygen level is normal.      She was recently see with ophthalmology and was diagnosed with an abscess of the left eyelid. She is using drops and ointment as well as oral treatment.     Review of Systems- pertinent positive in bold:  A 10-point ROS is negative except as stated in HPI         Current Scheduled Meds:  Outpatient Encounter Prescriptions as of 7/24/2018   Medication Sig Dispense Refill     acetaminophen (TYLENOL) 325 MG tablet Take 650 mg by mouth every 6 (six) hours as needed for pain.       acetone, urine, test (ACETONE, URINE, TEST) Strp Check urine for ketones if blood sugar is above 250.  Call Dr Soliz if ketones are moderate or large. 50 strip 3     albuterol (PROAIR HFA;PROVENTIL HFA;VENTOLIN HFA) 90 mcg/actuation inhaler Inhale 2 puffs every 6 (six) hours as needed  for wheezing. 1 each 0     alcohol swabs (ALCOHOL PADS) PadM Apply 1 Units topically daily. 70% 100 each 3     amLODIPine (NORVASC) 5 MG tablet Take 2.5 mg by mouth at bedtime. Indications: hypertension       aspirin 81 MG EC tablet Take 81 mg by mouth daily.       blood glucose test strips One each 4 times per day. Dispense prodigy strips for talking meter 100 each 11     blood-glucose meter,continuous (DEXCOM G5 ) Misc use as directed 1 each 0     blood-glucose sensor (DEXCOM G5-G4 SENSOR) Jie Change every 7 days 4 Device 13     blood-glucose transmitter (DEXCOM G5 TRANSMITTER) Jie Change every 3 months 1 Device 3     calcium acetate (PHOSLO) 667 mg capsule Take 1,334 mg by mouth daily as needed (With snack).       cholecalciferol, vitamin D3, 1,000 unit tablet Take 1,000 Units by mouth 4 (four) times a week. On non-dialysis days       cinacalcet (SENSIPAR) 30 MG tablet Take 30 mg by mouth 3 (three) times a week. M, W, F       CLASSIC PRENATAL 28 mg iron- 800 mcg Tab Take 1 tablet by mouth daily. 90 tablet 3     cyclobenzaprine (FLEXERIL) 5 MG tablet Take 2 tablets (10 mg total) by mouth every 8 (eight) hours as needed for muscle spasms. 30 tablet 1     diphenoxylate-atropine (LOMOTIL) 2.5-0.025 mg per tablet Take 1 tablet by mouth 4 (four) times a day as needed for diarrhea. 90 tablet 1     gabapentin (NEURONTIN) 300 MG capsule Take 600 mg by mouth 3 (three) times a day.       glucagon, human recombinant, (GLUCAGON) 1 mg injection Use as directed for hypoglycemia (Patient taking differently: Inject 1 each under the skin. Use as directed for hypoglycemia) 1 each 3     glucose (DEX4 GLUCOSE) 4 GM chewable tablet Chew 16 g as needed for low blood sugar.       insulin aspart U-100 (NOVOLOG U-100 INSULIN ASPART) 100 unit/mL injection Use daily in pump up to 100 units daily 10 mL PRN     insulin glargine (LANTUS; BASAGLAR) 100 unit/mL (3 mL) pen Inject 28 Units under the skin at bedtime. when insulin pump  "fails 15 mL 0     insulin syringe-needle U-100 (BD INSULIN SYRINGE ULTRA-FINE) 0.3 mL 31 x 5/16\" Syrg Test 3 times daily 100 each 3     lancing device with lancets Kit Juan contour brand 1 each 0     lidocaine-prilocaine (EMLA) cream Apply 1 application topically as needed.       losartan (COZAAR) 50 MG tablet Take 1 tablet (50 mg total) by mouth daily. 90 tablet 2     LUBRICATING PLUS 0.5 % Dpet ophthalmic dropperette Apply 1 drop to eye every hour as needed.   6     metoclopramide (REGLAN) 5 MG tablet TAKE 1 PILL BY MOUTH 3 TIMES DAILY BEFORE MEALS 180 tablet 0     metoprolol tartrate (LOPRESSOR) 50 MG tablet Take 1 tablet (50 mg total) by mouth 2 (two) times a day. 180 tablet 1     NOVOLOG FLEXPEN U-100 INSULIN 100 unit/mL injection pen 0.65 Type 1 with hyperglycemia  Flex Pen Needles - BD Ultra-fine Zo Pen Needles - NDC [address]-72 - dispense 1 case  No supplies needed 15 mL 0     omeprazole (PRILOSEC) 20 MG capsule Take 20 mg by mouth daily before breakfast.       rOPINIRole (REQUIP) 0.25 MG tablet Take 0.25 mg by mouth at bedtime.       transparent dressings 2 3/8 X 2 3/4 \" Bndg Apply 1 patch topically every third day. 6 each 11     [DISCONTINUED] calcium acetate (PHOSLO) 667 mg capsule Take 1 capsule (667 mg total) by mouth 3 (three) times a day. 90 capsule 11     [DISCONTINUED] loperamide (IMODIUM) 2 mg capsule Take 1 capsule (2 mg total) by mouth 4 (four) times a day as needed for diarrhea. 90 capsule 1     loperamide (IMODIUM) 2 mg capsule Take 1 capsule (2 mg total) by mouth 4 (four) times a day as needed for diarrhea. 90 capsule 1     No facility-administered encounter medications on file as of 7/24/2018.      Past Medical History:   Diagnosis Date     Blind     blind in right eye and limited vision in left eye     CAP (community acquired pneumonia) 3/15/2018     DM type 1 (diabetes mellitus, type 1) (H)     insulin pump     ESRD (end stage renal disease) on dialysis (H)      Gastroparesis 7/9/2015 "     GERD (gastroesophageal reflux disease)      History of vitrectomy     Both eyes     HTN (hypertension)      ROSE (obstructive sleep apnea)      PN (peripheral neuropathy) (H)      Presence of insulin pump      Retinal detachment      RLS (restless legs syndrome)      Sebaceous cyst of ear 3/30/2016     Past Surgical History:   Procedure Laterality Date     APPENDECTOMY  age 21     AV FISTULA PLACEMENT Left      CATARACT EXTRACTION, BILATERAL       COLONOSCOPY N/A 12/11/2017    Procedure: COLONOSCOPY with biopsy;  Surgeon: Lito Green MD;  Location: [facility];  Service:      ESOPHAGOGASTRODUODENOSCOPY N/A 12/11/2017    Procedure: ESOPHAGOGASTRODUODENOSCOPY (EGD) with biopsy;  Surgeon: Lito Green MD;  Location: [facility];  Service:      EYE SURGERY Bilateral 2010    cataract     Social History   Substance Use Topics     Smoking status: Former Smoker     Packs/day: 0.50     Years: 0.50     Types: Cigarettes     Quit date: 1/1/1998     Smokeless tobacco: Never Used      Comment: She smoked for 6 months as a teenager.     Alcohol use No       Objective / Physical Examination:  Vitals:    07/24/18 1248   BP: 122/70   Pulse: 78   SpO2: 98%   Weight: 222 lb (100.7 kg)     Wt Readings from Last 3 Encounters:   07/24/18 (!) 222 lb 8 oz (100.9 kg)   07/24/18 222 lb (100.7 kg)   07/13/18 217 lb 9.6 oz (98.7 kg)     Body mass index is 44.84 kg/(m^2).     General Appearance: Alert and oriented, cooperative, affect appropriate, speech clear, in no apparent distress  Neck: Supple, trachea midline. No carotid bruits  Lungs: Clear to auscultation bilaterally. Normal inspiratory and expiratory effort  Cardiovascular: Regular rate, normal S1, S2. No murmurs, rubs, or gallops  Abdomen: Bowel sounds active all four quadrants. Soft, non-tender.  Extremities: Pulses 2+ and equal throughout. No edema    Orders Placed This Encounter   Procedures     Ambulatory referral to Nutrition Services   Followup: Return in  about 3 months (around 10/24/2018) for Next scheduled follow up. earlier if needed.      Rachel Verma, CNP

## 2021-06-19 NOTE — PROGRESS NOTES
Chief Complaint   Patient presents with     Sore Throat     admit vomitting. 3x days. pt state of a high blood sugar          HPI      Patient reported 3 days of moderate sore throat, with vomiting. She vomited three times today. She has DM-Type 1 and her blood glucose has been high. Fasting blood sugar this AM was 500. She reported taking her meds as prescribed. She had some chicken nuggets and water today. No abdominal pain, fever, chest pain, shortness of breath.     ROS: Pertinent ROS noted in HPI.     Allergies   Allergen Reactions     Ibuprofen Anaphylaxis     Lactose Diarrhea     Metformin Hives     Other Food Allergy      Apple juice, orange juice         Patient Active Problem List   Diagnosis     Hypertension     Anemia in CKD (chronic kidney disease)     GERD (gastroesophageal reflux disease)     Diabetic peripheral neuropathy associated with type 1 diabetes mellitus (H)     Type 1 diabetes mellitus with complication (H)     ESRD (end stage renal disease) on dialysis- Tue, Thur, Sat     RLS (restless legs syndrome)      Retinal detachment     Gastroparesis     SHANNON - shortness of breath with exertion that is limiting.     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 Started using daily since 3/2018     Legally blind       Family History   Problem Relation Age of Onset     Hypertension Mother      Diabetes Mother      Depression Mother      Stroke Mother      Sleep apnea Mother      Aneurysm Mother      Cerebral aneursym, untreated per patient.     Snoring Mother      Asthma Sister      Sleep apnea Sister      Snoring Sister      Asthma Brother      Sleep apnea Brother      Snoring  Brother      Asthma Sister      Snoring Sister      Sleep apnea Sister      Diabetes Sister      Type 2 DM     Snoring Sister      Asthma Daughter      Breast cancer Maternal Aunt      Breast cancer Maternal Aunt        Social History     Social History     Marital status: Single     Spouse name: N/A     Number of children: N/A     Years of education: N/A     Occupational History     Not on file.     Social History Main Topics     Smoking status: Former Smoker     Packs/day: 0.50     Years: 0.50     Types: Cigarettes     Quit date: 1/1/1998     Smokeless tobacco: Never Used      Comment: She smoked for 6 months as a teenager.     Alcohol use No     Drug use: No     Sexual activity: No      Comment: lives with female partner of 10 years     Other Topics Concern     Not on file     Social History Narrative    Patient lives with her partner Krystyna and her daughter.     Objective:  Vitals:    07/11/18 1500 07/11/18 1515 07/11/18 1549   BP: 130/84     Pulse: 86     Resp: 14  (!) 92   Temp: 98.8  F (37.1  C)     TempSrc: Oral     SpO2: (!) 84% 91%    Weight: 219 lb (99.3 kg)           Gen:NAD  Throat: oropharynx clear, tonsils normal  Neck: NAD  CV: RRR, normal S1S2, no M,R ,G   Pulm: CTAB, normal effort  Abd: unable to detect bowel sounds due to body habitus, soft, moderate pain at epigastric area to palpation, no mass.    Recent Results (from the past 24 hour(s))   Rapid Strep A Screen-Throat   Result Value Ref Range    Rapid Strep A Antigen No Group A Strep detected, presumptive negative No Group A Strep detected, presumptive negative           Type 1 diabetes mellitus with hyperglycemia (H) - fasting BS this AM was 500 pre patient. She has DM-type 1, and ESRD on dialysis.     -     Basic Metabolic Panel    Hypoxia - initial O2 upon arrival was 84, went up to 88-89. She was given 2 L supplemental O2 after which her O2 level improved to upper 90s. But after removal of supplemental O2, her level went down to 89-91.      Throat pain  -     Rapid Strep A Screen-Throat  -     Group A Strep, RNA Direct Detection, Throat      High risk patient came in for sore throat, vomiting and not feeling well. Exam showed mild hypoxia with epigastric tenderness. With mild hypoxia and concern for hyperglycemia/DKA, recommended further care in the ER. Spoke with [facility] ER provider. Patient taken to ER by her adult friend.

## 2021-06-20 NOTE — PROGRESS NOTES
TCM DISCHARGE FOLLOW UP CALL    Discharge Date:  9/2/2018  Reason for hospital stay (discharge diagnosis)::  Clotted vascular graft, hyperkalemia  Are you feeling better, the same or worse since your discharge?:  Patient is feeling better  Do you feel like you have a plan in the event of a health emergency?: No    (RN) Patient provided with information to call us in the event of a health emergency: Yes (informed pt of [facility] and nurse triage availability)    As part of your discharge plan, were  home care services ordered for you?: Yes    Have you seen them yet, or are they scheduled to visit?: Yes    Do you have any follow up visits scheduled with your PCP or Specialist?:  Yes, with PCP  (RN) Is PCP appt scheduled soon enough (within 14 days of discharge date)?: Yes

## 2021-06-20 NOTE — PROGRESS NOTES
Internal Medicine Office Visit  [facility]  Patient Name: Sabra Leigh  Patient Age: 33 y.o.  YOB: 1985  MRN: [id number]    Date of Visit: 2018  Reason for Office Visit:   Chief Complaint   Patient presents with     Hospital Visit Follow Up           Assessment / Plan / Medical Decision Makin. Hospital discharge follow-up  2. Arteriovenous fistula occlusion, sequela  3. Hyperkalemia  - reviewed inpatient records and labs   - Potassium- recheck today    4. ESRD (end stage renal disease) on dialysis- Sixto Davidson, Sat  - Continue regular dialysis as scheduled     5. Type 1 diabetes mellitus with chronic kidney disease on chronic dialysis (H)  - Will have her sit down with  prior to leaving clinic to schedule dietician follow up to help with diabetes and renal diet restrictions     6. Moderate major depression (H)  - more difficulty recently due to the death of her mother. She denies thoughts of suicide or self harm. Will continue to monitor       Health Maintenance Review  Health Maintenance   Topic Date Due     ADVANCE DIRECTIVES DISCUSSED WITH PATIENT  2002     DIABETES URINE MICROALBUMIN  2017     INFLUENZA VACCINE RULE BASED (1) 2018     DEPRESSION FOLLOW UP  2018     DIABETES FOLLOW-UP  2018     DIABETES HEMOGLOBIN A1C  2019     DIABETES FOOT EXAM  2019     PAP SMEAR  2020     TD 18+ HE  2024     TDAP ADULT ONE TIME DOSE  Completed         I am having Ms. Leigh maintain her glucose, acetone (urine) test, insulin syringe-needle U-100, glucagon (human recombinant), acetaminophen, transparent dressings, alcohol swabs, aspirin, LUBRICATING PLUS, cinacalcet, CLASSIC PRENATAL, lancing device with lancets, rOPINIRole, amLODIPine, losartan, diphenoxylate-atropine, (blood-glucose meter,continuous), blood-glucose transmitter, blood-glucose sensor, cyclobenzaprine, albuterol, metoprolol tartrate,  insulin aspart U-100, calcium acetate, cholecalciferol (vitamin D3), lidocaine-prilocaine, insulin glargine, NovoLOG Flexpen U-100 Insulin, metoclopramide, loperamide, omeprazole, omeprazole, erythromycin, gabapentin, insulin pump cartridge, and blood glucose test.      HPI:  Sabra Leigh is a 33 y.o. year old who presents to the office today for follow up of recent hospitalization.     She has a history of DM-1 with ESRD on dialysis, dialyzes TTS. HD on Thursday last week was uncomplicated, however, on  she was found to have a clotted AVF. She was sent to the ED to obtain access. She was found to have a K of 7.8. Temporary dialysis catheter was placed and was then able to be dialyzed. Tuesday of earlier this week she had the declotting procedure and this morning dialyzed without issue.     She is more down today. Her mother  on  due to complications of lung cancer. There is a lot of family feuds now regarding dividing her possessions. Her mother's death made her think more about her own health.  She is quite concerned about her high potassium as part of her mother's death was attributed to organ failure and a very high potassium by patient's report.    Her daughter was recently diagnosed with prediabetes. Since her daughter's diet needs to change this has helped her to make dietary changes as well. She admits that she does not know how to balance a renal and diabetic diet. She has not been answering her phone and never scheduled an appointment with the dietitian after her last visit.       Review of Systems- pertinent positive in bold:  Negative for chest pain, SOA       Current Scheduled Meds:  Outpatient Encounter Prescriptions as of 2018   Medication Sig Dispense Refill     acetaminophen (TYLENOL) 325 MG tablet Take 650 mg by mouth every 6 (six) hours as needed for pain.       acetone, urine, test (ACETONE, URINE, TEST) Strp Check urine for ketones if blood sugar is above 250.  Call   [facility] if ketones are moderate or large. 50 strip 3     albuterol (PROAIR HFA;PROVENTIL HFA;VENTOLIN HFA) 90 mcg/actuation inhaler Inhale 2 puffs every 6 (six) hours as needed for wheezing. 1 each 0     alcohol swabs (ALCOHOL PADS) PadM Apply 1 Units topically daily. 70% 100 each 3     amLODIPine (NORVASC) 5 MG tablet Take 2.5 mg by mouth at bedtime. Indications: hypertension       aspirin 81 MG EC tablet Take 81 mg by mouth daily.       blood glucose test strips One each 4 times per day. Dispense prodigy strips for talking meter 120 strip 5     blood-glucose meter,continuous (DEXCOM G5 ) Misc use as directed 1 each 0     blood-glucose sensor (DEXCOM G5-G4 SENSOR) Jie Change every 7 days 4 Device 13     blood-glucose transmitter (DEXCOM G5 TRANSMITTER) Jie Change every 3 months 1 Device 3     calcium acetate (PHOSLO) 667 mg capsule Take 1,334 mg by mouth daily as needed (With snack).       cholecalciferol, vitamin D3, 1,000 unit tablet Take 1,000 Units by mouth 4 (four) times a week. On non-dialysis days       cinacalcet (SENSIPAR) 30 MG tablet Take 30 mg by mouth 3 (three) times a week. M, W, F       CLASSIC PRENATAL 28 mg iron- 800 mcg Tab Take 1 tablet by mouth daily. 90 tablet 3     cyclobenzaprine (FLEXERIL) 5 MG tablet Take 2 tablets (10 mg total) by mouth every 8 (eight) hours as needed for muscle spasms. 30 tablet 1     diphenoxylate-atropine (LOMOTIL) 2.5-0.025 mg per tablet Take 1 tablet by mouth 4 (four) times a day as needed for diarrhea. 90 tablet 1     erythromycin ophthalmic ointment Administer 1 application into the left eye at bedtime.        gabapentin (NEURONTIN) 100 MG capsule Take 200 mg by mouth 3 (three) times a day. 60 capsule 0     glucagon, human recombinant, (GLUCAGON) 1 mg injection Use as directed for hypoglycemia 1 each 3     glucose (DEX4 GLUCOSE) 4 GM chewable tablet Chew 16 g as needed for low blood sugar.       insulin aspart U-100 (NOVOLOG U-100 INSULIN ASPART) 100 unit/mL  "injection Use daily in pump up to 100 units daily 10 mL PRN     insulin glargine (LANTUS; BASAGLAR) 100 unit/mL (3 mL) pen Inject 28 Units under the skin at bedtime. when insulin pump fails 15 mL 0     insulin pump cartridge Inject under the skin continuous. Insulin pump discharge instructions from 09/02/18.  This is intended as additional information to the patient's PTA insulin pump order.  Restart insulin pump at present settings in 1800 hours. 1 each 0     insulin syringe-needle U-100 (BD INSULIN SYRINGE ULTRA-FINE) 0.3 mL 31 x 5/16\" Syrg Test 3 times daily 100 each 3     lancing device with lancets Kit Juan contour brand 1 each 0     lidocaine-prilocaine (EMLA) cream Apply 1 application topically as needed.       loperamide (IMODIUM) 2 mg capsule Take 1 capsule (2 mg total) by mouth 4 (four) times a day as needed for diarrhea. 90 capsule 1     losartan (COZAAR) 50 MG tablet Take 1 tablet (50 mg total) by mouth daily. 90 tablet 2     LUBRICATING PLUS 0.5 % Dpet ophthalmic dropperette Apply 1 drop to eye every hour as needed.   6     metoclopramide (REGLAN) 5 MG tablet TAKE 1 PILL BY MOUTH 3 TIMES DAILY BEFORE MEALS 180 tablet 0     metoprolol tartrate (LOPRESSOR) 50 MG tablet Take 1 tablet (50 mg total) by mouth 2 (two) times a day. 180 tablet 1     NOVOLOG FLEXPEN U-100 INSULIN 100 unit/mL injection pen 0.65 Type 1 with hyperglycemia  Flex Pen Needles - BD Ultra-fine Zo Pen Needles - NDC [address]-93 - dispense 1 case  No supplies needed 15 mL 0     omeprazole (PRILOSEC) 20 MG capsule TAKE 1 PILL BY MOUTH EVERYDAY 30 MINUTES BEFORE A MEAL TO PREVENT HEARTBURN 90 capsule 3     omeprazole (PRILOSEC) 40 MG capsule Take 40 mg by mouth daily before breakfast. Indications: Heartburn       rOPINIRole (REQUIP) 0.25 MG tablet Take 0.25 mg by mouth at bedtime.       transparent dressings 2 3/8 X 2 3/4 \" Bndg Apply 1 patch topically every third day. 6 each 11     No facility-administered encounter medications on file " as of 9/6/2018.      Past Medical History:   Diagnosis Date     Blind     blind in right eye and limited vision in left eye     CAP (community acquired pneumonia) 3/15/2018     DM type 1 (diabetes mellitus, type 1) (H)     insulin pump     ESRD (end stage renal disease) on dialysis (H)      Gastroparesis 7/9/2015     GERD (gastroesophageal reflux disease)      History of vitrectomy     Both eyes     HTN (hypertension)      ROSE (obstructive sleep apnea)      PN (peripheral neuropathy) (H)      Presence of insulin pump      Retinal detachment      RLS (restless legs syndrome)      Sebaceous cyst of ear 3/30/2016     Past Surgical History:   Procedure Laterality Date     APPENDECTOMY  age 21     AV FISTULA PLACEMENT Left      CATARACT EXTRACTION, BILATERAL       COLONOSCOPY N/A 12/11/2017    Procedure: COLONOSCOPY with biopsy;  Surgeon: Lito Green MD;  Location: [facility];  Service:      ESOPHAGOGASTRODUODENOSCOPY N/A 12/11/2017    Procedure: ESOPHAGOGASTRODUODENOSCOPY (EGD) with biopsy;  Surgeon: Lito Green MD;  Location: [facility];  Service:      EYE SURGERY Bilateral 2010    cataract     Social History   Substance Use Topics     Smoking status: Former Smoker     Packs/day: 0.50     Years: 0.50     Types: Cigarettes     Quit date: 1/1/1998     Smokeless tobacco: Never Used      Comment: She smoked for 6 months as a teenager.     Alcohol use No       Objective / Physical Examination:  Vitals:    09/06/18 1100   BP: 122/62   Pulse: 72   Weight: 219 lb (99.3 kg)     Wt Readings from Last 3 Encounters:   09/06/18 219 lb (99.3 kg)   09/01/18 217 lb 12.8 oz (98.8 kg)   08/21/18 215 lb 12.8 oz (97.9 kg)     Body mass index is 44.23 kg/(m^2).     General Appearance: Alert and oriented, cooperative, affect appropriate, speech clear, in no apparent distress  Lungs: Clear to auscultation bilaterally. Normal inspiratory and expiratory effort  Cardiovascular: Regular rate, normal S1, S2. No murmurs,  rubs, or gallops  Abdomen: Bowel sounds active all four quadrants. Soft, non-tender.   Extremities: No edema.  +bruit and thrill left AV fistula    Orders Placed This Encounter   Procedures     Potassium   Followup: Return in about 2 months (around 11/6/2018) for Next scheduled follow up. earlier if needed.        Rachel Verma, CNP

## 2021-06-20 NOTE — PROGRESS NOTES
Family reports Sully past away after a seizure this week. Patient will be removed from the [facility] list.

## 2021-06-20 NOTE — PROGRESS NOTES
Attempt 1: Care Guide called patient.  If this patient is returning my call, please transfer to [place] at ext [phone].    Next outreach 9/13/18

## 2021-06-20 NOTE — PROGRESS NOTES
"[facility]  ENDOCRINOLOGY    Diabetes Note 8/23/2018    Sabra Leigh, 1985, [id number]          Reason for visit      1. Uncontrolled type 1 diabetes mellitus with chronic kidney disease on chronic dialysis (H)        HPI     Sabra Leigh is a very pleasant 33 y.o. old female who presents for follow up.  SUMMARY:  Sully returns today in f/u for DM 1.  Her latest A1c is 9.6 and is down from the last at 9.9. This is almost 3 months old, and she is due for her next in two days.  She is here with her daughter, who is often her caregiver.  Sully reports that her mother, who has been battling cancer, has passed.  They are struggling to get the money together to put her in the ground. Her eyes are looking good, and her daughter states that she has been consistent with putting in her mom's eye drops.  Her diet has worsened since her mom's passing.  They rely a lot on what is easily made for Sully, because of her eyesight.  She has only been eating once a day, and doesn't get herself together to eat breakfast on days when she has Dialysis.  She continues to use the insulin pump for her insulin administration. She relies on the sound that the pump makes as she pushes the Act button to give herself insulin.  She has a Prodigy Meter, which has an Audio that tells her what her BG is.  She often only tests daily because of the cost of strips, but will often only test once because she just doesn't keep track of things closely. She has two \"hi\" readings listed in the last two weeks, as well as an 82 and a 522.        Past Medical History     Patient Active Problem List   Diagnosis     Hypertension     Anemia in CKD (chronic kidney disease)     GERD (gastroesophageal reflux disease)     Diabetic peripheral neuropathy associated with type 1 diabetes mellitus (H)     Uncontrolled type 1 diabetes mellitus with chronic kidney disease on chronic dialysis (H)     ESRD (end stage renal disease) on dialysis- Tue, Thur, Sat     " "RLS (restless legs syndrome)      Retinal detachment     Gastroparesis     SHANNON - shortness of breath with exertion that is limiting.     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 Started using daily since 3/2018     Legally blind     Hyperglycemia     Hypoxia        Family History       family history includes Aneurysm in her mother; Asthma in her brother, daughter, sister, and sister; Breast cancer in her maternal aunt and maternal aunt; Depression in her mother; Diabetes in her mother and sister; Hypertension in her mother; Sleep apnea in her brother, mother, sister, and sister; Snoring in her brother, mother, sister, sister, and sister; Stroke in her mother.    Social History      reports that she quit smoking about 20 years ago. Her smoking use included Cigarettes. She has a 0.25 pack-year smoking history. She has never used smokeless tobacco. She reports that she does not drink alcohol or use illicit drugs.      Review of Systems     Patient has no polyuria or polydipsia, no chest pain, dyspnea or TIA's, no numbness, tingling or pain in extremities  Remainder negative except as noted in HPI.    Vital Signs     BP 96/68 (Patient Site: Right Arm, Patient Position: Sitting, Cuff Size: Adult Regular)  Pulse 80  Ht 4' 11\" (1.499 m)  Wt 215 lb 12.8 oz (97.9 kg)  BMI 43.59 kg/m2  Wt Readings from Last 3 Encounters:   08/21/18 215 lb 12.8 oz (97.9 kg)   07/24/18 (!) 222 lb 8 oz (100.9 kg)   07/24/18 222 lb (100.7 kg)       Physical Exam     Constitutional:  Well developed, Well nourished  HENT:  Normocephalic,   Neck: Thyroid normal, No lymph nodes, Supple  Eyes:  PERRL, Conjunctiva " "pink  Respiratory:  Normal breath sounds, No respiratory distress  Cardiovascular:  Normal heart rate, Normal rhythm, No murmurs  GI:  Bowel sounds normal, Soft, No tenderness  Musculoskeletal:  No gross deformity or lesions, normal dorsalis pedis pulses  Skin: No acanthosis nigricans, lipoatrophy or lipodystrophy  Neurologic:  Alert & oriented x 3, nonfocal  Psychiatric:  Affect, Mood, Insight appropriate      Assessment     1. Uncontrolled type 1 diabetes mellitus with chronic kidney disease on chronic dialysis (H)        Plan     As usual, I have asked Sully to eat regularly.  Once a day is not enough and with her constantly wanting to lose weight, she will not be very successful with her body in \"starvation\" mode.  She and her daughter both want to lose weight.  I have suggested that they go out and walk together.  She has been working on her feet with regular application of lotion, and they look better.  She will f/u with me in 1 month. Time spent with pt today: 25 min with >50% spent in counseling and coordination of care.        Misti Chapa   Endocrinology  8/23/2018  6:57 AM         Lab Results     Hemoglobin A1c   Date Value Ref Range Status   05/22/2018 9.6 (H) 3.5 - 6.0 % Final   02/13/2018 9.9 (H) 3.5 - 6.0 % Final   11/07/2017 8.8 (H) 3.5 - 6.0 % Final   06/23/2017 10.0 (H) 3.5 - 6.0 % Final   02/03/2017 9.1 (H) 3.5 - 6.0 % Final     Creatinine   Date Value Ref Range Status   07/13/2018 7.46 (HH) 0.60 - 1.10 mg/dL Final   07/12/2018 11.71 (HH) 0.60 - 1.10 mg/dL Final   07/12/2018 11.37 (HH) 0.60 - 1.10 mg/dL Final     Microalbumin, Random Urine   Date Value Ref Range Status   07/09/2015 499.19 (H) 0.00 - 1.99 mg/dL Final       Cholesterol   Date Value Ref Range Status   04/27/2016 190 <=199 mg/dL Final     HDL Cholesterol   Date Value Ref Range Status   04/27/2016 30 (L) >=50 mg/dL Final     LDL Calculated   Date Value Ref Range Status   04/27/2016  <=129 mg/dL Final     Comment:     Invalid, " Triglycerides >400     Triglycerides   Date Value Ref Range Status   04/27/2016 444 (H) <=149 mg/dL Final       Lab Results   Component Value Date    ALT 43 03/19/2018    AST 44 (H) 03/19/2018    ALKPHOS 251 (H) 03/19/2018    BILITOT 0.2 03/19/2018         Current Medications     Outpatient Medications Prior to Visit   Medication Sig Dispense Refill     acetaminophen (TYLENOL) 325 MG tablet Take 650 mg by mouth every 6 (six) hours as needed for pain.       acetone, urine, test (ACETONE, URINE, TEST) Strp Check urine for ketones if blood sugar is above 250.  Call Dr Soliz if ketones are moderate or large. 50 strip 3     albuterol (PROAIR HFA;PROVENTIL HFA;VENTOLIN HFA) 90 mcg/actuation inhaler Inhale 2 puffs every 6 (six) hours as needed for wheezing. 1 each 0     alcohol swabs (ALCOHOL PADS) PadM Apply 1 Units topically daily. 70% 100 each 3     amLODIPine (NORVASC) 5 MG tablet Take 2.5 mg by mouth at bedtime. Indications: hypertension       aspirin 81 MG EC tablet Take 81 mg by mouth daily.       BASAGLAR KWIKPEN U-100 INSULIN 100 unit/mL (3 mL) pen INJECT 18 UNITS SUBCUTANEOUSLY TWICE DAILY WHEN INSULIN PUMP FAILS 15 mL 10     blood glucose test strips One each 4 times per day. Dispense prodigy strips for talking meter 100 each 11     blood-glucose meter,continuous (DEXCOM G5 ) Misc use as directed 1 each 0     blood-glucose sensor (DEXCOM G5-G4 SENSOR) Jie Change every 7 days 4 Device 13     blood-glucose transmitter (DEXCOM G5 TRANSMITTER) Jie Change every 3 months 1 Device 3     calcium acetate (PHOSLO) 667 mg capsule Take 1,334 mg by mouth daily as needed (With snack).       cholecalciferol, vitamin D3, 1,000 unit tablet Take 1,000 Units by mouth 4 (four) times a week. On non-dialysis days       cinacalcet (SENSIPAR) 30 MG tablet Take 30 mg by mouth 3 (three) times a week. M, W, F       CLASSIC PRENATAL 28 mg iron- 800 mcg Tab Take 1 tablet by mouth daily. 90 tablet 3     cyclobenzaprine (FLEXERIL) 5  "MG tablet Take 2 tablets (10 mg total) by mouth every 8 (eight) hours as needed for muscle spasms. 30 tablet 1     diphenoxylate-atropine (LOMOTIL) 2.5-0.025 mg per tablet Take 1 tablet by mouth 4 (four) times a day as needed for diarrhea. 90 tablet 1     gabapentin (NEURONTIN) 300 MG capsule Take 600 mg by mouth 3 (three) times a day.       glucagon, human recombinant, (GLUCAGON) 1 mg injection Use as directed for hypoglycemia (Patient taking differently: Inject 1 each under the skin. Use as directed for hypoglycemia) 1 each 3     glucose (DEX4 GLUCOSE) 4 GM chewable tablet Chew 16 g as needed for low blood sugar.       insulin aspart U-100 (NOVOLOG U-100 INSULIN ASPART) 100 unit/mL injection Use daily in pump up to 100 units daily 10 mL PRN     insulin glargine (LANTUS; BASAGLAR) 100 unit/mL (3 mL) pen Inject 28 Units under the skin at bedtime. when insulin pump fails 15 mL 0     insulin syringe-needle U-100 (BD INSULIN SYRINGE ULTRA-FINE) 0.3 mL 31 x 5/16\" Syrg Test 3 times daily 100 each 3     lancing device with lancets Kit Jaun contour brand 1 each 0     lidocaine-prilocaine (EMLA) cream Apply 1 application topically as needed.       loperamide (IMODIUM) 2 mg capsule Take 1 capsule (2 mg total) by mouth 4 (four) times a day as needed for diarrhea. 90 capsule 1     losartan (COZAAR) 50 MG tablet Take 1 tablet (50 mg total) by mouth daily. 90 tablet 2     LUBRICATING PLUS 0.5 % Dpet ophthalmic dropperette Apply 1 drop to eye every hour as needed.   6     metoclopramide (REGLAN) 5 MG tablet TAKE 1 PILL BY MOUTH 3 TIMES DAILY BEFORE MEALS 180 tablet 0     metoprolol tartrate (LOPRESSOR) 50 MG tablet Take 1 tablet (50 mg total) by mouth 2 (two) times a day. 180 tablet 1     NOVOLOG FLEXPEN U-100 INSULIN 100 unit/mL injection pen 0.65 Type 1 with hyperglycemia  Flex Pen Needles - BD Ultra-fine Zo Pen Needles - NDC [address]-86 - dispense 1 case  No supplies needed 15 mL 0     omeprazole (PRILOSEC) 20 MG capsule " "TAKE 1 PILL BY MOUTH EVERYDAY 30 MINUTES BEFORE A MEAL TO PREVENT HEARTBURN 90 capsule 3     omeprazole (PRILOSEC) 40 MG capsule Take 40 mg by mouth daily before breakfast. Indications: Heartburn       rOPINIRole (REQUIP) 0.25 MG tablet Take 0.25 mg by mouth at bedtime.       transparent dressings 2 3/8 X 2 3/4 \" Bndg Apply 1 patch topically every third day. 6 each 11     No facility-administered medications prior to visit.            "

## 2021-06-25 NOTE — PROGRESS NOTES
Progress Notes by Alexys Moore MD at 2/24/2017 10:40 AM     Author: Alexys Moore MD Service: -- Author Type: Physician    Filed: 2/25/2017  3:27 PM Encounter Date: 2/24/2017 Status: Signed    : Alexys Moore MD (Physician)       2/24/2017     Visit Vitals   ? /70 (Patient Site: Left Arm, Patient Position: Sitting, Cuff Size: Adult Large)   ? Pulse 76   ? Wt 209 lb 9.6 oz (95.1 kg)   ? SpO2 91%   ? BMI 42.33 kg/m2       Past Medical History:   Diagnosis Date   ? Blind     blind in right eye and limited vision in left eye   ? DM type 1 (diabetes mellitus, type 1)     insulin pump   ? ESRD (end stage renal disease) on dialysis    ? Gastroparesis 7/9/2015   ? GERD (gastroesophageal reflux disease)    ? History of vitrectomy     Both eyes   ? HTN (hypertension)    ? ROSE (obstructive sleep apnea)    ? PN (peripheral neuropathy)    ? Retinal detachment    ? RLS (restless legs syndrome)        Social History     Social History   ? Marital status: Single     Spouse name: N/A   ? Number of children: N/A   ? Years of education: N/A     Occupational History   ? Not on file.     Social History Main Topics   ? Smoking status: Former Smoker     Packs/day: 0.50     Years: 0.50     Types: Cigarettes     Quit date: 1/1/1998   ? Smokeless tobacco: Never Used      Comment: She smoked for 6 months as a teenager.   ? Alcohol use No   ? Drug use: No   ? Sexual activity: No      Comment: lives with female partner of 10 years     Other Topics Concern   ? Not on file     Social History Narrative    Patient lives with her partner Krystyna and her daughter.       Family History   Problem Relation Age of Onset   ? Hypertension Mother    ? Diabetes Mother    ? Depression Mother    ? Stroke Mother    ? Sleep apnea Mother    ? Aneurysm Mother      Cerebral aneursym, untreated per patient.   ? Snoring Mother    ? Asthma Sister    ? Sleep apnea Sister    ? Snoring Sister    ? Asthma Brother    ? Sleep apnea  "Brother    ? Snoring Brother    ? Asthma Sister    ? Snoring Sister    ? Sleep apnea Sister    ? Diabetes Sister      Type 2 DM   ? Snoring Sister    ? Asthma Daughter        As part of this visit I have today personally reviewed past medical history, family medical history, social history (specifically including tobacco use), current medications and intolerances/allergies.  I have updated and/or or corrected these areas of history as may have been appropriate.    Allergies   Allergen Reactions   ? Apple Juice Diarrhea   ? Lactose    ? Metformin Hives   ? Orange Juice Diarrhea       Current Outpatient Prescriptions   Medication Sig Note   ? acetaminophen (TYLENOL) 325 MG tablet Take 650 mg by mouth every 6 (six) hours as needed for pain.    ? acetone, urine, test (ACETONE, URINE, TEST) Strp Check urine for ketones if blood sugar is above 250.  Call Dr Soliz if ketones are moderate or large.    ? alcohol swabs (ALCOHOL PADS) PadM Apply 1 Units topically daily. 70%    ? amLODIPine (NORVASC) 10 MG tablet Take 10 mg by mouth daily.    ? aspirin 81 MG EC tablet Take 81 mg by mouth daily.    ? azithromycin (ZITHROMAX) 250 MG tablet Take 250 mg by mouth daily. Take 500 mg (2 x 250 mg tablets) on day 1 followed by 250 mg (1 tablet) on days 2-5. 2/24/2017: Started yesterday from Dr. Howe at [facility].  This was \"for a cold and I was aching really bad all in my ribs and it hurt.\"  2/24/2017    ? blood glucose meter (GLUCOMETER) Use 1 each As Directed as needed. Dispense prodigy talking glucose meter    ? blood glucose test strips One each 4 times per day. Dispense prodigy strips for talking meter    ? calcium acetate (PHOSLO) 667 mg capsule Take 1 capsule (667 mg total) by mouth 3 (three) times a day.    ? cane Jie Use as directed    ? cholecalciferol, vitamin D3, 1,000 unit tablet Take 1 tablet (1,000 Units total) by mouth daily.    ? CLASSIC PRENATAL 28 mg iron- 800 mcg Tab Take 1 tablet by mouth daily.    ? CONTOUR NEXT STRIPS " "strips CHECK BLOOD SUGARS 4X/DAY DUE TO SEVERE HYPOGLYCEMIA.    ? erythromycin ophthalmic ointment Administer 1 application to the right eye 4 (four) times a day.    ? gabapentin (NEURONTIN) 300 MG capsule Take 1 capsule (300 mg total) by mouth daily. 7/13/2016: Takes qam - for pain in the legs.  7/13/2016    ? glucagon, human recombinant, (GLUCAGON) 1 mg injection Use as directed for hypoglycemia (Patient taking differently: Inject 1 each under the skin. Use as directed for hypoglycemia) 12/23/2016: \"Probably September (2016)\" was the last use.  12/23/2016    ? glucose (DEX4 GLUCOSE) 4 GM chewable tablet Chew 16 g as needed for low blood sugar.    ? insulin needles, disposable, (BD INSULIN PEN NEEDLE UF SHORT) 31 X 5/16 \" Ndle Use As Directed 3 (three) times a day.    ? insulin pump cartridge Crtg 1 each Inject under the skin continuous. novolog insulin    ? insulin pump cartridge Inject under the skin continuous. Insulin pump discharge instructions from 10/15/16.  This is intended as additional information to the patient's PTA insulin pump order.  Continue with insulin pump at present settings.    ? insulin syringe-needle U-100 (BD INSULIN SYRINGE ULTRA-FINE) 0.3 mL 31 x 5/16\" Syrg Test 3 times daily    ? LANCING DEVICE MISC Use As Directed.    ? LANTUS SOLOSTAR 100 unit/mL (3 mL) pen Inject 8 units BID if pump fails (Patient taking differently: Inject 12 Units under the skin as needed (Only used when pump was not working). )    ? losartan-hydrochlorothiazide (HYZAAR) 50-12.5 mg per tablet Take 1 tablet by mouth daily.    ? metoclopramide (REGLAN) 5 MG tablet Take 1 TID, AC.  **Discuss this with Dr. Moore on your next visit, Sabra.\"  Pharmacist:  Please read note.    ? metoprolol tartrate (LOPRESSOR) 50 MG tablet Take 1 tablet (50 mg total) by mouth 2 (two) times a day.    ? NOVOLOG 100 unit/mL injection USE UP TO 50 UNITS DAILY IN INSULIN PUMP    ? omeprazole (PRILOSEC) 20 MG capsule TAKE ONE CAPSULE BY MOUTH " "ONE TIME DAILY 30 MINUTES BEFORE A MEAL TO PREVENT HEARTBURN    ? prenatal vit-iron fumarate-FA 28 mg iron- 800 mcg Tab Take 1 tablet by mouth daily.    ? rOPINIRole (REQUIP) 0.25 MG tablet Take 0.25 mg by mouth bedtime.    ? transparent dressings 2 3/8 X 2 3/4 \" Bndg Apply 1 patch topically every third day.        Patient Active Problem List   Diagnosis   ? Hypertension   ? Obstructive Sleep Apnea - does not use CPAP - she says she was told she needed it, but that it was not prescribed.   ? Anemia in CKD (chronic kidney disease)   ? GERD (gastroesophageal reflux disease)   ? Diabetic peripheral neuropathy associated with type 1 diabetes mellitus   ? DM type 1 (diabetes mellitus, type 1)   ? ESRD (end stage renal disease) on dialysis   ? RLS (restless legs syndrome) - ropinorole, 0.25 mg at hs at hospital discharge - November, 2015   ? Retinal detachment   ? Gastroparesis   ? SHANNON - shortness of breath with exertion that is limiting.   ? Goiter   ? Insulin pump status   ? 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 Cerebral atrophy   ? Sebaceous cyst of ear   ? Morbid obesity with BMI of 40.0-44.9, adult   ? Lipoma of abdominal wall   ? Chest pain   ? Stroke - states that her whole left side of her body was numb - [place] [facility] [place] [place]   ? Cataract   ? Chronic diarrhea   ? CTS (carpal tunnel syndrome) - both sides       Diabetes - Misti Chapa RN, NP and Loyda Doe RN (CDE) are managing her diabetes.  Lab Results   Component Value Date    HGBA1C 9.1 (H) 02/03/2017     Lab Results   Component Value Date    MICROALBUR 499.19 (H) 07/09/2015       Hypovitaminosis D - treated at dialysis, I believe.  VITAMIN D, TOTAL (25-HYDROXY)   Date Value Ref Range Status   08/12/2016 25.8 (L) 30.0 - 80.0 ng/mL Final       Lipid status - she is not yet 39 years old, so a statin is not yet mandated.  Lab Results   Component Value Date    CHOL 190 04/27/2016    CHOL 115 09/24/2014    CHOL 142 08/05/2014     Lab Results " "  Component Value Date    HDL 30 (L) 04/27/2016    HDL 33 (L) 09/24/2014    HDL 46 08/05/2014     Lab Results   Component Value Date    LDLCALC  04/27/2016      Comment:      Invalid, Triglycerides >400    LDLCALC 63 09/24/2014    LDLCALC 75 08/05/2014     Lab Results   Component Value Date    TRIG 444 (H) 04/27/2016    TRIG 95 09/24/2014    TRIG 105 08/05/2014     Thyroid status - normal TSH.  Not on levothyroxine.  Lab Results   Component Value Date    TSH 1.52 02/03/2017       CBC monitoring - she has the anemia of chronic disease.  Lab Results   Component Value Date    WBC 5.9 10/15/2016    HGB 11.0 (L) 10/15/2016    HCT 33.5 (L) 10/15/2016    MCV 96 10/15/2016     (L) 10/15/2016     CMP - she has a dramatically abnormal CMP.  I reviewed the values from October, 2016.  Results for orders placed or performed in visit on 10/14/16   Comprehensive Metabolic Panel   Result Value Ref Range    Sodium 139 136 - 145 mmol/L    Potassium 5.0 3.5 - 5.0 mmol/L    Chloride 97 (L) 98 - 107 mmol/L    CO2 28 22 - 31 mmol/L    Anion Gap, Calculation 14 5 - 18 mmol/L    Glucose 296 (H) 70 - 125 mg/dL    BUN 41 (H) 8 - 22 mg/dL    Creatinine 9.02 (HH) 0.60 - 1.10 mg/dL    GFR MDRD Af Amer 6 (L) >60 mL/min/1.73m2    GFR MDRD Non Af Amer 5 (L) >60 mL/min/1.73m2    Bilirubin, Total 0.4 0.0 - 1.0 mg/dL    Calcium 9.7 8.5 - 10.5 mg/dL    Protein, Total 6.7 6.0 - 8.0 g/dL    Albumin 3.4 (L) 3.5 - 5.0 g/dL    Alkaline Phosphatase 281 (H) 45 - 120 U/L    AST 28 0 - 40 U/L    ALT 31 0 - 45 U/L       Left cornea scratched - [place] [facility] [place]  She has an ointment.  They did not patch it, she says.  She was also given artificial tears.      Viral syndrome (??) - Dr. Howe, [facility], saw her earlier this week at dialysis and started her on a Z-Leandro.    Dialysis - [facility] in [place] is the facility.    Low platelets - she doesn't have good enough vision to know she says.    Bariatric surgery - she says that " she has four more months to go to lose it on her own.  She is not firmly convinced that she wants the surgery, or at least it sounds like that to me.    Does she have an entrapment neuropathy?  Not very likely.    Spine Care EMG     2/15/2017  [facility]       Henrry Ba, DO   Physical Medicine and Rehabilitation    Numbness and tingling in both hands +1 more   Dx    EMG; Referred by Alexys Moore MD   Reason for visit    Progress Notes      The patient presents at the request of Dr. Moore for a bilateral upper extremity EMG. She has over a one-year history of left greater than right hand numbness and tingling through the palm with stiffness and symptoms through digits 1-4. Fifth digit not involved. She is right-handed. She is a history of diabetes mellitus on dialysis.     EMG/NCS results: Please see scanned full report  Verbal consent was obtained.     Comment NCS: Abnormal study:  1.  Prolonged latency bilateral median SNAPs and transcarpal studies with borderline or decreased amplitudes  2.  Absent bilateral ulnar SNAPs and transcarpal studies  3.  Mildly prolonged left median CMAP latency with slowed conduction velocity bilaterally  4.  Mildly prolonged right ulnar CMAP latency with slowed conduction velocity bilaterally  5.  Low amplitude left radial SNAP     Comment EMG: Normal study  1.  Normal needle EMG bilateral upper extremities     Interpretation: Abnormal study     1.  Electrodiagnostic findings are most consistent with a sensorimotor polyneuropathy, predominantly axonal, relatively severe.      2.  Given the severity of the polyneuropathy findings, I cannot completely exclude a relatively mild median or ulnar neuropathy in the bilateral upper extremities. With the relatively symmetrical findings, this may very well represent polyneuropathy alone.      3. There is no electrodiagnostic evidence of cervical radiculopathy in the bilateral upper extremities.        This is likely  "due to diabetes and/or ESRD.  I do not have a treatment to recommend.    She is on Neurontin.        Impression    1.  Type 1 diabetes with insulin pump  2.  Diabetic peripheral neuropathy  3.  Morbid obesity  4.  Diabetic retinopathy with legal blindness.  5.  End-stage renal disease with dialysis  6.  Bariatric surgery candidate.  7.  Thrombocytopenia.  8.  Recent \"virus\" treated at [facility].  Dr. Howe put her on a Z-Leandro, she says.  He stopped by and saw her at dialysis, she reports.  9.  Diabetic peripheral neuropathy involving the upper extremities.  This is an axonal neuropathy.  10.  Scratched left cornea.    Plan    1.  After Visit Summary was provided in 24-point bold print..  2.  Continue efforts at weight loss.  3.  I explained that she does not appear to have either carpal tunnel syndrome or ulnar neuropathy.  I explained that there is little that I can do to help with feeling in her hands and fingers.  4.  Return visit in a month.  5.  Increase Neurontin/gabapentin from 300 mg every morning to 300 mg 3-4 times a day to see if that helps her associated neuropathic discomfort.  6.  Continue conservative treatment for the scratched cornea.  Interestingly, she was not told to patch the eye.  This condition is being treated by another physician.      Much or all of the text in this note was generated through the use of Dragon Dictate voice-to-text software.  Errors in spelling or words which seem out of context are unintentional.  Dragon has a significant issue with pronouns and, of course, homonyms.  Errors with words of this sort may escape editing.      Patient Instructions   1.  Surgery will not help your hand pain.    2.  You have a neuropathy that is likely due to your diabetes and your kidney disease.      3.  I recommend that you increase your Neurontin/gabapentin from 300 mg every morning to 300 mg 3-4 times a day and see if that helps.    4.  Come see me again in a " "month.              Left cornea scratched - [place] [facility] [place]  She has an ointment.  They did not patch it, she says.  She was also given artificial tears.         "

## 2021-06-25 NOTE — PROGRESS NOTES
Progress Notes by Alexys Parker at 11/21/2017  3:00 PM     Author: Alexys Parker Service: -- Author Type: Nurse Practitioner    Filed: 11/21/2017  3:31 PM Encounter Date: 11/21/2017 Status: Signed    : Alexys Parker [facility]    Date of Service: 11/21/2017  Primary Provider: Alexys Parker CNP    Patient Care Team:  Alexys Parker CNP as PCP - General (Nurse Practitioner)  Brandy Carmona as Clinic Care Coordination Care Guide  Renetta Powell as Diabetes Ed  Yola Donato MD as  (Clinic Care Coordination)  Amanda Sauer as  (Clinic Care Coordination)  Misti Chapa NP as Nurse Practitioner (Nurse Practitioner)  Mimi Johnson as   [facility],   Emelyn as Registered Nurse  Christel Foster RN as Registered Nurse  SHUBHAM Connor as      ______________________________________________________________________     Patient's Pharmacy:    [facility] [address] [[address] [address]  Phone: [phone] Fax: [phone]    [facility] - [address] [place] [facility] [address]  Phone: [phone] Fax: [phone]     Patient's Insurance:    Payor: MEDICARE / Plan: MEDICARE A AND B / Product Type: Medicare /     ______________________________________________________________________    Assessment:    1. Follow up    2. GERD (gastroesophageal reflux disease)       ______________________________________________________________________      PHQ-2 Total Score: 1 (8/21/2017 10:00 AM)  Depression Follow-up Plan: under care of mental health counselor (6/16/2017  4:00 PM)  PHQ-9 Total Score: 16 (8/21/2017 10:00 AM)     Plan:  Patient Instructions   1. Continue current medications  2. Await results of  EGD and Colonoscopy (12/22)  3. Recheck CBC in 2 weeks if not done by GI    ______________________________________________________________________     Sabra Leigh is 32 y.o. female who comes in today for:    Chief Complaint   Patient presents with   ? Follow-up     3 months. tomorrow she is having a Esophageal dysphagia tomorrow, and colonoscopy is being done next month       Patient Active Problem List   Diagnosis   ? Hypertension   ? Obstructive Sleep Apnea - does not use CPAP - she says she was told she needed it, but that it was not prescribed.   ? Anemia in CKD (chronic kidney disease)   ? GERD (gastroesophageal reflux disease)   ? Diabetic peripheral neuropathy associated with type 1 diabetes mellitus   ? Type 1 diabetes mellitus with complication   ? ESRD (end stage renal disease) on dialysis   ? RLS (restless legs syndrome) - ropinorole, 0.25 mg at hs at hospital discharge - November, 2015   ? Retinal detachment   ? Gastroparesis   ? SHANNON - shortness of breath with exertion that is limiting.   ? Goiter   ? Insulin pump status   ? 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 Cerebral atrophy   ? Sebaceous cyst of ear   ? Morbid obesity with BMI of 40.0-44.9, adult   ? Lipoma of abdominal wall   ? Chest pain   ? Stroke - states that her whole left side of her body was numb - [facility]   ? Cataract   ? Chronic diarrhea   ? CTS (carpal tunnel syndrome) - both sides - **possible** - NCV does not clearly show this (February, 2017)   ? Breast mass, left breast - no evidence of breast mass on clinical examination of 4/7/17   ? Diarrhea of presumed infectious origin   ? Diarrhea   ? Gastroenteritis   ? Secondary hypertension   ? Lower abdominal pain   ? End stage renal disease   ? Anemia, unspecified type     Current Outpatient Prescriptions   Medication Sig   ? acetaminophen (TYLENOL) 325 MG tablet Take 650 mg by mouth every 6 (six) hours as needed for pain.   ? acetone, urine, test (ACETONE,  "URINE, TEST) Strp Check urine for ketones if blood sugar is above 250.  Call Dr Soliz if ketones are moderate or large.   ? alcohol swabs (ALCOHOL PADS) PadM Apply 1 Units topically daily. 70%   ? amLODIPine (NORVASC) 10 MG tablet Take 1 tablet (10 mg total) by mouth daily.   ? aspirin 81 MG EC tablet Take 81 mg by mouth daily.   ? blood glucose meter (GLUCOMETER) Use 1 each As Directed as needed. Dispense prodigy talking glucose meter   ? blood glucose test strips One each 4 times per day. Dispense prodigy strips for talking meter   ? calcium acetate (PHOSLO) 667 mg capsule Take 1 capsule (667 mg total) by mouth 3 (three) times a day. (Patient taking differently: Take 1 capsule by mouth 2 (two) times a day. Indications: Renal Osteodystrophy with Hyperphosphatemia)   ? cholecalciferol, vitamin D3, 1,000 unit tablet Take 1 tablet (1,000 Units total) by mouth daily.   ? cinacalcet (SENSIPAR) 30 MG tablet Take 30 mg by mouth daily. Indications: Hyperparathyroidism Secondary to CRF with Dialysis   ? CLASSIC PRENATAL 28 mg iron- 800 mcg Tab Take 1 tablet by mouth daily.   ? diphenoxylate-atropine (LOMOTIL) 2.5-0.025 mg per tablet TAKE ONE TABLET BY MOUTH FOUR TIMES DAILY AS NEEDED FOR DIARRHEA   ? erythromycin ophthalmic ointment Administer 1 application to the right eye 4 (four) times a day.   ? gabapentin (NEURONTIN) 300 MG capsule Take 1 capsule 4 times a day   ? glucagon, human recombinant, (GLUCAGON) 1 mg injection Use as directed for hypoglycemia (Patient taking differently: Inject 1 each under the skin. Use as directed for hypoglycemia)   ? glucose (DEX4 GLUCOSE) 4 GM chewable tablet Chew 16 g as needed for low blood sugar.   ? insulin aspart (NOVOLOG FLEXPEN) 100 unit/mL injection pen Use up to 80 units daily when insulin pump fails   ? insulin aspart (NOVOLOG) 100 unit/mL injection For use with pump up to 75 units daily   ? insulin syringe-needle U-100 (BD INSULIN SYRINGE ULTRA-FINE) 0.3 mL 31 x 5/16\" Syrg Test 3 " "times daily   ? LANCING DEVICE MISC Use As Directed.   ? LANTUS SOLOSTAR 100 unit/mL (3 mL) pen Inject 8 units BID if pump fails (Patient taking differently: Inject 12 Units under the skin as needed (Only used when pump was not working). )   ? loperamide (IMODIUM A-D) 2 mg tablet Take 1 tablet (2 mg total) by mouth 3 (three) times a day as needed for diarrhea.   ? losartan-hydrochlorothiazide (HYZAAR) 50-12.5 mg per tablet Take 1 tablet by mouth every day   ? LUBRICATING PLUS 0.5 % Dpet ophthalmic dropperette Apply 1 drop to eye as needed.    ? metoclopramide (REGLAN) 5 MG tablet Take 5 mg by mouth 2 (two) times a day.    ? metoclopramide (REGLAN) 5 MG tablet TAKE ONE TABLET BY MOUTH THREE TIMES DAILY BEFORE MEALS   ? metoprolol tartrate (LOPRESSOR) 50 MG tablet Take 50 mg by mouth 2 (two) times a day.   ? omeprazole (PRILOSEC) 20 MG capsule Take 20 mg by mouth daily before breakfast.   ? rOPINIRole (REQUIP) 0.25 MG tablet TAKE ONE TABLET BY MOUTH AT BEDTIME    ? transparent dressings 2 3/8 X 2 3/4 \" Bndg Apply 1 patch topically every third day.     Social History     Social History Narrative    Patient lives with her partner Krystyna and her daughter.     ______________________________________________________________________     History of present illness: Sabra Leigh is a pleasant 32 y.o. female who presents in clinic today for 3 month follow up visit.  She is feeling better, has lost some weight, is exercising/walking, and her A1c is improved - down to 8.8 from 10.3.  She reports that she has been scheduled for an EGD for her esophageal dysphagia symptoms - food and pills get stuck in the base of her throat, tomorrow at 1:15pm at [facility].  She is also scheduled for a colonoscopy next month.  She continues to have diarrhea daily.  She continues taking imodium for this which seems to help. Her weight is down 7 lbs per our records.  Ongoing anemia - will need to recheck Hgb within 2 weeks if not already done by " GI.    Review of systems:   10 point review of systems is negative unless noted in the HPI.     ______________________________________________________________________    Wt Readings from Last 3 Encounters:   11/21/17 212 lb 4.8 oz (96.3 kg)   11/14/17 212 lb 9.6 oz (96.4 kg)   09/21/17 219 lb (99.3 kg)     BP Readings from Last 3 Encounters:   11/21/17 102/64   11/16/17 110/70   11/14/17 120/82     /64 (Patient Site: Right Arm, Patient Position: Sitting, Cuff Size: Adult Regular)  Pulse 81  Wt 212 lb 4.8 oz (96.3 kg)  BMI 42.88 kg/m2     Physical Exam:  General Appearance: Alert, cooperative, no distress, appears stated age  Head: Normocephalic, without obvious abnormality, atraumatic  Eyes: PERRL, conjunctiva/corneas clear, EOM's intact  Ears: Normal TM's and external ear canals, both ears  Nose: Nares normal, septum midline,mucosa normal, no drainage  Throat: Lips, mucosa, and tongue normal; teeth and gums normal; no erythema, exudate, or thrush  Neck: Supple, symmetrical, trachea midline, no adenopathy;  thyroid: not enlarged, symmetric, no tenderness/mass/nodules  Back: Symmetric, no curvature, ROM normal, no CVA tenderness  Lungs: Clear to auscultation bilaterally, respirations unlabored  Heart: Regular rate and rhythm, S1 and S2 normal, no murmur, rub, or gallop  Abdomen: Soft, non-tender, bowel sounds active all four quadrants,  no masses, no organomegaly  Musculoskeletal: Normal range of motion. No joint swelling or deformity.   Extremities: Extremities normal, atraumatic, no cyanosis or edema  Skin: Skin color, texture, turgor normal, no rashes or lesions  Lymph nodes: Cervical & supraclavicular nodes normal  Neurologic: She is alert & oriented x 3. She has a normal gait.   Psychiatric: She has a normal mood and affect.     Lab Results   Component Value Date    WBC 6.8 09/01/2017    HGB 8.8 (L) 09/01/2017    HCT 27.7 (L) 09/01/2017    MCV 92 09/01/2017    PLT 82 (L) 09/01/2017         Alexys NEWTON  CLAUDE Parker  Internal Medicine  [facility]     Return for Appt. after your test/procedure to discuss results.

## 2021-06-25 NOTE — PROGRESS NOTES
Progress Notes by Alexys Parker at 6/23/2017 11:40 AM     Author: Alexys Parker Service: -- Author Type: Nurse Practitioner    Filed: 6/30/2017  3:21 PM Encounter Date: 6/23/2017 Status: Signed    : [facility]    Date of Service: 6/23/2017  Primary Provider: Alexys Parker CNP    Patient Care Team:  Alexys Parker CNP as PCP - General (Nurse Practitioner)  Brandy Carmona as Clinic Care Coordination Care Guide  Renetta Powell as Diabetes Ed     ______________________________________________________________________     Patient's Pharmacy:    [facility] [address] [facility] [[address] [address]  Phone: [phone] Fax: [phone]    [facility] - [address] [place] [address]  Phone: [phone] Fax: [phone]     Patient's Insurance:    Payor: MEDICARE / Plan: MEDICARE A AND B / Product Type: Medicare /     ______________________________________________________________________    Assessment:    1. Encounter to establish care    2. Type 1 diabetes mellitus with chronic kidney disease on chronic dialysis    3. GERD (gastroesophageal reflux disease)    4. Essential hypertension with goal blood pressure less than 130/85    5. ESRD (end stage renal disease) on dialysis       ______________________________________________________________________      PHQ-2 Total Score: 4 (6/16/2017  4:00 PM)  Depression Follow-up Plan: under care of mental health counselor (6/16/2017  4:00 PM)  PHQ-9 Total Score: 8 (6/16/2017  4:00 PM)     Plan:  Patient Instructions   1. Check BMP, A1c today  2. Refilled omeprazole at this visit  3. Lotion feet daily to prevent them from getting too dry, peeling/cracking; f/u with Podiatry as needed for DM foot/nail care.  4. Please follow up with Misti Chapa NP, Endocrinology for  T1DM management  5. Follow up in 2-3 months.      ______________________________________________________________________     Sabra Leigh is 32 y.o. female who comes in today for:    Chief Complaint   Patient presents with   ? Medication Refill       Patient Active Problem List   Diagnosis   ? Hypertension   ? Obstructive Sleep Apnea - does not use CPAP - she says she was told she needed it, but that it was not prescribed.   ? Anemia in CKD (chronic kidney disease)   ? GERD (gastroesophageal reflux disease)   ? Diabetic peripheral neuropathy associated with type 1 diabetes mellitus   ? DM type 1 (diabetes mellitus, type 1)   ? ESRD (end stage renal disease) on dialysis   ? RLS (restless legs syndrome) - ropinorole, 0.25 mg at hs at hospital discharge - November, 2015   ? Retinal detachment   ? Gastroparesis   ? SHANNON - shortness of breath with exertion that is limiting.   ? Goiter   ? Insulin pump status   ? 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 Cerebral atrophy   ? Sebaceous cyst of ear   ? Morbid obesity with BMI of 40.0-44.9, adult   ? Lipoma of abdominal wall   ? Chest pain   ? Stroke - states that her whole left side of her body was numb - this was at [facility] [place]   ? Cataract   ? Chronic diarrhea   ? CTS (carpal tunnel syndrome) - both sides - **possible** - NCV does not clearly show this (February, 2017)   ? Breast mass, left breast - no evidence of breast mass on clinical examination of 4/7/17     Past Medical History:   Diagnosis Date   ? Blind     blind in right eye and limited vision in left eye   ? DM type 1 (diabetes mellitus, type 1)     insulin pump   ? ESRD (end stage renal disease) on dialysis    ? Gastroparesis 7/9/2015   ? GERD (gastroesophageal reflux disease)    ? History of vitrectomy     Both eyes   ? HTN (hypertension)    ? ROSE (obstructive sleep apnea)    ? PN (peripheral neuropathy)    ? Retinal detachment    ? RLS (restless legs syndrome)      Past Surgical History:   Procedure  Laterality Date   ? APPENDECTOMY  age 21   ? AV FISTULA PLACEMENT Left    ? CATARACT EXTRACTION, BILATERAL     ? EYE SURGERY Bilateral 2010    cataract     Allergies   Allergen Reactions   ? Metformin Hives       Current Outpatient Prescriptions   Medication Sig Note   ? acetaminophen (TYLENOL) 325 MG tablet Take 650 mg by mouth every 6 (six) hours as needed for pain.    ? acetone, urine, test (ACETONE, URINE, TEST) Strp Check urine for ketones if blood sugar is above 250.  Call Dr Soliz if ketones are moderate or large.    ? alcohol swabs (ALCOHOL PADS) PadM Apply 1 Units topically daily. 70%    ? amLODIPine (NORVASC) 10 MG tablet Take 1 tablet (10 mg total) by mouth daily.    ? aspirin 81 MG EC tablet Take 81 mg by mouth daily.    ? blood glucose meter (GLUCOMETER) Use 1 each As Directed as needed. Dispense prodigy talking glucose meter    ? blood glucose test (CONTOUR NEXT STRIPS) strips Test 6 times daily    ? blood glucose test strips One each 4 times per day. Dispense prodigy strips for talking meter    ? calcium acetate (PHOSLO) 667 mg capsule Take 1 capsule (667 mg total) by mouth 3 (three) times a day. (Patient taking differently: Take 1 capsule by mouth 2 (two) times a day. Indications: Renal Osteodystrophy with Hyperphosphatemia)    ? cane Jie Use as directed    ? cholecalciferol, vitamin D3, 1,000 unit tablet Take 1 tablet (1,000 Units total) by mouth daily.    ? CLASSIC PRENATAL 28 mg iron- 800 mcg Tab Take 1 tablet by mouth daily.    ? erythromycin ophthalmic ointment Administer 1 application to the right eye 4 (four) times a day.    ? gabapentin (NEURONTIN) 300 MG capsule Take 1 3-4 time a day for diabetic neuropathy pain.    ? glucagon, human recombinant, (GLUCAGON) 1 mg injection Use as directed for hypoglycemia (Patient taking differently: Inject 1 each under the skin. Use as directed for hypoglycemia)    ? glucose (DEX4 GLUCOSE) 4 GM chewable tablet Chew 16 g as needed for low blood sugar.    ?  "insulin aspart (NOVOLOG FLEXPEN) 100 unit/mL injection pen Inject under the skin. Use in insulin pump up to 50 units daily    ? insulin lispro (HUMALOG KWIKPEN) 100 unit/mL pen Inject 1-12 Units, 3 times daily with meals (Patient taking differently: For use if insulin pump fails)    ? insulin needles, disposable, (BD INSULIN PEN NEEDLE UF SHORT) 31 X 5/16 \" Ndle Use As Directed 3 (three) times a day.    ? insulin pump cartridge Crtg 1 each Inject under the skin continuous. novolog insulin    ? insulin pump cartridge Inject under the skin continuous. Insulin pump discharge instructions from 10/15/16.  This is intended as additional information to the patient's PTA insulin pump order.  Continue with insulin pump at present settings.    ? insulin syringe-needle U-100 (BD INSULIN SYRINGE ULTRA-FINE) 0.3 mL 31 x 5/16\" Syrg Test 3 times daily    ? LANCING DEVICE MISC Use As Directed.    ? LANTUS SOLOSTAR 100 unit/mL (3 mL) pen Inject 8 units BID if pump fails (Patient taking differently: Inject 12 Units under the skin as needed (Only used when pump was not working). )    ? losartan-hydrochlorothiazide (HYZAAR) 50-12.5 mg per tablet Take 1 tablet by mouth daily.    ? LUBRICATING PLUS 0.5 % Dpet ophthalmic dropperette  5/1/2017: Received from: External Pharmacy   ? metoprolol tartrate (LOPRESSOR) 50 MG tablet TAKE 1 TABLET (50 MG TOTAL) BY MOUTH 2 (TWO) TIMES A DAY.    ? NOVOLOG 100 unit/mL injection  5/1/2017: Received from: External Pharmacy   ? omeprazole (PRILOSEC) 20 MG capsule TAKE ONE CAPSULE BY MOUTH ONE TIME DAILY 30 minutes before a meal to prevent heartburn    ? prenatal vit-iron fumarate-FA 28 mg iron- 800 mcg Tab Take 1 tablet by mouth daily.    ? rOPINIRole (REQUIP) 0.25 MG tablet Take 0.25 mg by mouth bedtime.    ? transparent dressings 2 3/8 X 2 3/4 \" Bndg Apply 1 patch topically every third day.    ? metoclopramide (REGLAN) 5 MG tablet TAKE ONE TABLET BY MOUTH THREE TIMES A DAY BEFORE MEALS       Social " "History     Social History   ? Marital status: Single     Spouse name: N/A   ? Number of children: N/A   ? Years of education: N/A     Occupational History   ? Not on file.     Social History Main Topics   ? Smoking status: Former Smoker     Packs/day: 0.50     Years: 0.50     Types: Cigarettes     Quit date: 1/1/1998   ? Smokeless tobacco: Never Used      Comment: She smoked for 6 months as a teenager.   ? Alcohol use No   ? Drug use: No   ? Sexual activity: No      Comment: lives with female partner of 10 years     Other Topics Concern   ? Not on file     Social History Narrative    Patient lives with her partner Krystyna and her daughter.       ______________________________________________________________________     History of present illness: Sabra Leigh is a pleasant 32 y.o. female who presents in clinic today for establish care and medication refills.  Blind in right eye 2/2 diabetes retinal detachment and left eye mostly black & white vision moderately diminished.  She has been legally blind x 3 years.  She recently had tear duct tubes placed and has follow up appointment there on Monday.  She has T1DM, has an insulin pump and is followed by Misti Chapa NP of Endocrinology.  She is currently on hemodialysis at [facility] in [place], T/TH/Sat.  She sees Dr. Howe, of Nephrology.  Today, she is feeling pretty good and states that she is trying to lose weight, eat better, and trying to control her diabetes, but \"it is hard to do it all at once.\"  She sees a Mental Health Therapist, Mimi Johnson, at [facility] for anxiety and depression therapy every other week, which she feels is of benefit.    *This is a former patient of Dr. Alexys Moore.    *I have reviewed the patient's medical, surgical, social history in detail and updated the computerized patient record.    Review of systems:   10 point review of systems is negative unless noted in the " HPI.    ______________________________________________________________________    Wt Readings from Last 3 Encounters:   06/23/17 217 lb (98.4 kg)   04/25/17 218 lb (98.9 kg)   04/07/17 213 lb (96.6 kg)     BP Readings from Last 3 Encounters:   06/23/17 120/60   06/23/17 118/62   06/16/17 102/60     /60  Pulse 70  Wt 217 lb (98.4 kg)  BMI 43.83 kg/m2     Physical Exam:  General Appearance: Alert, cooperative, no distress, appears stated age  Head: Normocephalic, without obvious abnormality, atraumatic  Eyes: Legally blind OU, sclerae injected bilaterally 2/2 ?dry eyes; totally blind right eye, moderate vision loss left eye with only B&W vision  Ears: Normal TM's and external ear canals, both ears  Nose: Nares normal, septum midline,mucosa normal, no drainage  Throat: Lips, mucosa, and tongue normal; teeth and gums normal; no erythema, exudate, or thrush  Neck: Supple, symmetrical, trachea midline, no adenopathy;  thyroid: not enlarged, symmetric, no tenderness/mass/nodules  Back: Symmetric, no curvature, ROM normal, no CVA tenderness  Lungs: Clear to auscultation bilaterally, respirations unlabored  Heart: Regular rate and rhythm, S1 and S2 normal, no murmur, rub, or gallop  Abdomen: Soft, morbidly obese, non-tender, bowel sounds active all four quadrants,  no masses, no organomegaly  Extremities: Extremities normal, atraumatic, no cyanosis, trace LE edema bilaterally; + PN up to just above the knees; noted A/V fistula left arm  Skin: Skin color, texture, turgor normal, no rashes or lesions  Lymph nodes: Cervical & supraclavicular nodes normal  Neurologic: She is alert & oriented x 3.  Psychiatric: She has a normal mood and affect.     Diagnostics:  Results for orders placed or performed in visit on 06/23/17   Basic Metabolic Panel   Result Value Ref Range    Sodium 137 136 - 145 mmol/L    Potassium 5.3 (H) 3.5 - 5.0 mmol/L    Chloride 95 (L) 98 - 107 mmol/L    CO2 25 22 - 31 mmol/L    Anion Gap, Calculation  17 5 - 18 mmol/L    Glucose 469 (HH) 70 - 125 mg/dL    Calcium 9.3 8.5 - 10.5 mg/dL    BUN 53 (H) 8 - 22 mg/dL    Creatinine 9.80 (HH) 0.60 - 1.10 mg/dL    GFR MDRD Af Amer 6 (L) >60 mL/min/1.73m2    GFR MDRD Non Af Amer 5 (L) >60 mL/min/1.73m2       Lab Results   Component Value Date    HGBA1C 10.0 (H) 06/23/2017    HGBA1C 9.1 (H) 02/03/2017    HGBA1C 8.7 (H) 10/15/2016    HGBA1C 9.9 (H) 08/08/2016    HGBA1C 8.8 (H) 04/27/2016    HGBA1C 10.0 (H) 01/13/2016    HGBA1C 8.8 (H) 10/14/2015    HGBA1C 9.3 (H) 07/09/2015    HGBA1C 9.4 (H) 04/03/2015    HGBA1C 9.7 (H) 01/28/2015    HGBA1C 8.2 (H) 10/30/2014    HGBA1C 7.6 (H) 09/24/2014    HGBA1C 6.1 (H) 08/05/2014    HGBA1C 7.7 (H) 06/04/2014    HGBA1C 7.7 (H) 05/15/2014    HGBA1C 9.8 (H) 03/12/2014     Lab Results   Component Value Date    WBC 5.9 10/15/2016    HGB 11.0 (L) 10/15/2016    HCT 33.5 (L) 10/15/2016    MCV 96 10/15/2016     (L) 10/15/2016     Lab Results   Component Value Date    TSH 1.52 02/03/2017     Lab Results   Component Value Date    CHOL 190 04/27/2016    CHOL 115 09/24/2014    CHOL 142 08/05/2014     Lab Results   Component Value Date    HDL 30 (L) 04/27/2016    HDL 33 (L) 09/24/2014    HDL 46 08/05/2014     Lab Results   Component Value Date    LDLCALC  04/27/2016      Comment:      Invalid, Triglycerides >400    LDLCALC 63 09/24/2014    LDLCALC 75 08/05/2014     Lab Results   Component Value Date    TRIG 444 (H) 04/27/2016    TRIG 95 09/24/2014    TRIG 105 08/05/2014       Alexys Parker, [facility]  Internal Medicine  [facility]     Return in about 3 months (around 9/23/2017).

## 2021-06-25 NOTE — PROGRESS NOTES
Progress Notes by Alexys Parker at 9/5/2017 11:00 AM     Author: Alexys Parker Service: -- Author Type: Nurse Practitioner    Filed: 9/12/2017  1:37 AM Encounter Date: 9/5/2017 Status: Signed    : [facility]    Date of Service: 9/5/2017  Primary Provider: Alexys Parker CNP    Patient Care Team:  Alexys Parker CNP as PCP - General (Nurse Practitioner)  Brandy Carmona as Clinic Care Coordination Care Guide  Renetta Powell as Diabetes Ed  Yola Donato MD as  (Clinic Care Coordination)  Amanda Sauer as  (Clinic Care Coordination)     ______________________________________________________________________     Patient's Pharmacy:    [facility] [address] [[address] [address]  Phone: [phone] Fax: [phone]    [facility] - [address] [facility] [address]  Phone: [phone] Fax: [phone]     Patient's Insurance:    Payor: MEDICARE / Plan: MEDICARE A AND B / Product Type: Medicare /     ______________________________________________________________________    Assessment:    1. Hospital discharge follow-up    2. Gastroenteritis       ______________________________________________________________________      PHQ-2 Total Score: 1 (8/21/2017 10:00 AM)  Depression Follow-up Plan: under care of mental health counselor (6/16/2017  4:00 PM)  PHQ-9 Total Score: 16 (8/21/2017 10:00 AM)     Plan:  Patient Instructions   1. Go get the Imodium from the pharmacy and take it as directed.  2. Contact me by Friday or Monday with an update on your symptoms  3. Referral to GI for ongoing diarrhea if symptoms do not improve.    *Total of 35 minutes or greater were spent with the patient today with greater than 50% of the times spent in  counseling and coordination of care.     ______________________________________________________________________     Sabra Leigh is 32 y.o. female who comes in today for:    Chief Complaint   Patient presents with   ? Follow-up     Diarrhea, 8/30-9/1, not doing any better       Patient Active Problem List   Diagnosis   ? Hypertension   ? Obstructive Sleep Apnea - does not use CPAP - she says she was told she needed it, but that it was not prescribed.   ? Anemia in CKD (chronic kidney disease)   ? GERD (gastroesophageal reflux disease)   ? Diabetic peripheral neuropathy associated with type 1 diabetes mellitus   ? Type 1 diabetes mellitus with complication   ? ESRD (end stage renal disease) on dialysis   ? RLS (restless legs syndrome) - ropinorole, 0.25 mg at hs at hospital discharge - November, 2015   ? Retinal detachment   ? Gastroparesis   ? SHANNON - shortness of breath with exertion that is limiting.   ? Goiter   ? Insulin pump status   ? 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 Cerebral atrophy   ? Sebaceous cyst of ear   ? Morbid obesity with BMI of 40.0-44.9, adult   ? Lipoma of abdominal wall   ? Chest pain   ? Stroke - states that her whole left side of her body was numb - [place] [facility] [place]   ? Cataract   ? Chronic diarrhea   ? CTS (carpal tunnel syndrome) - both sides - **possible** - NCV does not clearly show this (February, 2017)   ? Breast mass, left breast - no evidence of breast mass on clinical examination of 4/7/17   ? Diarrhea of presumed infectious origin   ? Diarrhea   ? Gastroenteritis   ? Secondary hypertension   ? Lower abdominal pain   ? End stage renal disease   ? Anemia, unspecified type     Past Medical History:   Diagnosis Date   ? Blind     blind in right eye and limited vision in left eye   ? DM type 1 (diabetes mellitus, type 1)     insulin pump   ? ESRD (end stage renal disease) on dialysis    ? Gastroparesis 7/9/2015   ? GERD (gastroesophageal reflux disease)    ?  History of vitrectomy     Both eyes   ? HTN (hypertension)    ? ROSE (obstructive sleep apnea)    ? PN (peripheral neuropathy)    ? Retinal detachment    ? RLS (restless legs syndrome)      Past Surgical History:   Procedure Laterality Date   ? APPENDECTOMY  age 21   ? AV FISTULA PLACEMENT Left    ? CATARACT EXTRACTION, BILATERAL     ? EYE SURGERY Bilateral 2010    cataract     Allergies   Allergen Reactions   ? Apple Juice Hives   ? Metformin Hives     Current Outpatient Prescriptions   Medication Sig Note   ? acetaminophen (TYLENOL) 325 MG tablet Take 650 mg by mouth every 6 (six) hours as needed for pain.    ? acetone, urine, test (ACETONE, URINE, TEST) Strp Check urine for ketones if blood sugar is above 250.  Call Dr Soliz if ketones are moderate or large.    ? alcohol swabs (ALCOHOL PADS) PadM Apply 1 Units topically daily. 70%    ? amLODIPine (NORVASC) 10 MG tablet Take 1 tablet (10 mg total) by mouth daily.    ? aspirin 81 MG EC tablet Take 81 mg by mouth daily.    ? blood glucose meter (GLUCOMETER) Use 1 each As Directed as needed. Dispense prodigy talking glucose meter    ? blood glucose test strips One each 4 times per day. Dispense prodigy strips for talking meter    ? calcium acetate (PHOSLO) 667 mg capsule Take 1 capsule (667 mg total) by mouth 3 (three) times a day. (Patient taking differently: Take 1 capsule by mouth 2 (two) times a day. Indications: Renal Osteodystrophy with Hyperphosphatemia) 8/30/2017: AM, noon   ? cholecalciferol, vitamin D3, 1,000 unit tablet Take 1 tablet (1,000 Units total) by mouth daily.    ? CLASSIC PRENATAL 28 mg iron- 800 mcg Tab Take 1 tablet by mouth daily.    ? diphenoxylate-atropine (LOMOTIL) 2.5-0.025 mg per tablet Take 1 tablet by mouth 4 (four) times a day as needed for diarrhea.    ? erythromycin ophthalmic ointment Administer 1 application to the right eye 4 (four) times a day.    ? gabapentin (NEURONTIN) 300 MG capsule Take 300 mg by mouth 4 (four) times a day.   "  ? glucagon, human recombinant, (GLUCAGON) 1 mg injection Use as directed for hypoglycemia (Patient taking differently: Inject 1 each under the skin. Use as directed for hypoglycemia)    ? glucose (DEX4 GLUCOSE) 4 GM chewable tablet Chew 16 g as needed for low blood sugar.    ? insulin aspart (NOVOLOG FLEXPEN) 100 unit/mL injection pen Inject under the skin. Use in insulin pump up to 80 units daily    ? insulin lispro (HUMALOG KWIKPEN) 100 unit/mL pen Inject 1-12 Units, 3 times daily with meals (Patient taking differently: For use if insulin pump fails)    ? insulin needles, disposable, (BD INSULIN PEN NEEDLE UF SHORT) 31 X 5/16 \" Ndle Use As Directed 3 (three) times a day.    ? insulin syringe-needle U-100 (BD INSULIN SYRINGE ULTRA-FINE) 0.3 mL 31 x 5/16\" Syrg Test 3 times daily    ? LANCING DEVICE MISC Use As Directed.    ? LANTUS SOLOSTAR 100 unit/mL (3 mL) pen Inject 8 units BID if pump fails (Patient taking differently: Inject 12 Units under the skin as needed (Only used when pump was not working). )    ? loperamide (IMODIUM A-D) 2 mg tablet Take 1 tablet (2 mg total) by mouth 3 (three) times a day as needed for diarrhea.    ? losartan-hydrochlorothiazide (HYZAAR) 50-12.5 mg per tablet Take 1 tablet by mouth every day    ? LUBRICATING PLUS 0.5 % Dpet ophthalmic dropperette Apply 1 drop to eye as needed.     ? metoclopramide (REGLAN) 5 MG tablet Take 5 mg by mouth 2 (two) times a day.  8/30/2017: AM, 1200   ? metoprolol tartrate (LOPRESSOR) 50 MG tablet Take 50 mg by mouth 2 (two) times a day.    ? omeprazole (PRILOSEC) 20 MG capsule Take 20 mg by mouth daily before breakfast.    ? rOPINIRole (REQUIP) 0.25 MG tablet Take 0.25 mg by mouth bedtime.    ? transparent dressings 2 3/8 X 2 3/4 \" Bndg Apply 1 patch topically every third day.      Social History     Social History   ? Marital status: Single     Spouse name: N/A   ? Number of children: N/A   ? Years of education: N/A     Occupational History   ? Not on " file.     Social History Main Topics   ? Smoking status: Former Smoker     Packs/day: 0.50     Years: 0.50     Types: Cigarettes     Quit date: 1/1/1998   ? Smokeless tobacco: Never Used      Comment: She smoked for 6 months as a teenager.   ? Alcohol use No   ? Drug use: No   ? Sexual activity: No      Comment: lives with female partner of 10 years     Other Topics Concern   ? Not on file     Social History Narrative    Patient lives with her partner Krystyna and her daughter.     ______________________________________________________________________     History of present illness: Sabra Leigh is a pleasant 32 y.o. female who presents in clinic today for [facility] discharge follow up for acute diarrhea/gastroenteritis on 8/30/2017-9/01/2017.  She was dialyzed in the hospital as she had received IV contrast for CT Abdomen/Pelvis for evaluation of ongoing diarrhea.  She reports that she has not yet purchased the Imodium yet and has taken a few of the Lomotil that were prescribed for her, but her symptoms continue. She has not had a stellar diet as [place] and admits that she did not eat very well, but wound up spending a great deal of time on the toilet as a result.  She will obtain the Imodium and take it as directed to see if her symptoms will chester with that.  She will contact me by Friday or Monday with an update on her symptoms and depending how things go I will make a referral to GI for further evaluation.  She reports wearing Depends now due to occasional accidents.    Review of systems:   10 point review of systems is negative unless noted in the HPI.    ______________________________________________________________________    Wt Readings from Last 3 Encounters:   09/05/17 219 lb 9.6 oz (99.6 kg)   08/31/17 (!) 222 lb 3.2 oz (100.8 kg)   08/21/17 (!) 222 lb 9.6 oz (101 kg)     BP Readings from Last 3 Encounters:   09/07/17 (!) 80/50   09/05/17 104/70   09/02/17 114/70      /70 (Patient Site: Right Arm, Patient Position: Sitting, Cuff Size: Adult Regular)  Pulse 84  Temp 96.9  F (36.1  C) (Oral)   Wt 219 lb 9.6 oz (99.6 kg)  SpO2 96%  BMI 44.35 kg/m2     Physical Exam:  General Appearance: Alert, cooperative, no distress, appears stated age  Lungs: Clear to auscultation bilaterally, respirations unlabored  Heart: Regular rate and rhythm, S1 and S2 normal, no murmur, rub, or gallop  Abdomen: Soft, non-tender, bowel sounds active all four quadrants  Neurologic: She is alert & oriented x 3.  Psychiatric: She has a normal mood and affect.     Diagnostics:  CT ABDOMEN PELVIS WO ORAL W IV CONTRAST  8/30/2017 5:14 PM      INDICATION: Abd pain with diarrhea  TECHNIQUE: CT abdomen and pelvis. Multiplanar reformation images (MPR). Dose reduction techniques were used.   IV CONTRAST: Iohexol (Omni) 100 mL  COMPARISON: Chest CT 2/18/2016.     FINDINGS:  LUNG BASES: Mild hazy density to both lung bases. No effusions.     ABDOMEN: The liver, spleen, pancreas, gallbladder, adrenal glands and both kidneys are normal area aorta is normal caliber. No adenopathy. Small bowel loops are normal.     PELVIS: Colon is essentially collapsed. No pericolonic fat stranding. No mucosal thickening. Terminal ileum is normal. Appendix not seen. Pelvic organs are normal. There is a 3 cm umbilical hernia containing only fat.     MUSCULOSKELETAL: No fractures or suspicious lesions.     IMPRESSION:   CONCLUSION:  1.  Collapsed colon without any mucosal thickening. Findings consistent with an enteritis. No signs of colitis.  2.  There is a small umbilical hernia containing only fat.      Alexys Parker, [facility]  Internal Medicine  [facility]     Return in about 1 week (around 9/12/2017), or if symptoms worsen or fail to improve.

## 2021-07-14 PROBLEM — J96.02 ACUTE RESPIRATORY FAILURE WITH HYPOXIA AND HYPERCAPNIA (H): Status: RESOLVED | Noted: 2018-01-01 | Resolved: 2018-01-01

## 2021-07-14 PROBLEM — J96.01 ACUTE RESPIRATORY FAILURE WITH HYPOXIA AND HYPERCAPNIA (H): Status: RESOLVED | Noted: 2018-01-01 | Resolved: 2018-01-01

## 2021-10-19 PROBLEM — F32.9 MAJOR DEPRESSION: Status: ACTIVE | Noted: 2018-01-01

## 2021-12-21 NOTE — PROGRESS NOTES
Progress Notes by Alexys Parker at 8/21/2017 10:40 AM     Author: Alexys Parker Service: -- Author Type: Nurse Practitioner    Filed: 8/21/2017 11:58 AM Encounter Date: 8/21/2017 Status: Signed    : Alexys Parker [facility]    Date of Service: 8/21/2017  Primary Provider: Alexys Parker CNP    Patient Care Team:  Alexys Parker CNP as PCP - General (Nurse Practitioner)  Brandy Carmona as Clinic Care Coordination Care Guide  Renetta Powell as Diabetes Ed  Lindsey Hogan as  (Clinic Care Coordination)     ______________________________________________________________________     Patient's Pharmacy:    [facility] [[address] [address]  Phone: [phone] Fax: [phone]    [facility] - [address] [place] [address]  Phone: [phone] Fax: [phone]     Patient's Insurance:    Payor: MEDICARE / Plan: MEDICARE A AND B / Product Type: Medicare /     ______________________________________________________________________    Assessment:    1. Diarrhea       ______________________________________________________________________      PHQ-2 Total Score: 1 (8/21/2017 10:00 AM)  Depression Follow-up Plan: under care of mental health counselor (6/16/2017  4:00 PM)  PHQ-9 Total Score: 8 (6/16/2017  4:00 PM)     Plan:  Patient Instructions   1. Lab tests ordered at today's visit:  - C. difficile Toxigenic by PCR; Future      Total of 25 minutes or greater were spent with the patient today with greater than 50% of the times spent in counseling and coordination of care.      ______________________________________________________________________     Sabra NASH Reese is 32 y.o. female who comes in today for:    Chief Complaint   Patient presents with   ? Follow-up     Possible C-Diff,  nurse took a stool sample but the lab she took it to they didnt run the test. Stools are normally watery.       Patient Active Problem List   Diagnosis   ? Hypertension   ? Obstructive Sleep Apnea - does not use CPAP - she says she was told she needed it, but that it was not prescribed.   ? Anemia in CKD (chronic kidney disease)   ? GERD (gastroesophageal reflux disease)   ? Diabetic peripheral neuropathy associated with type 1 diabetes mellitus   ? DM type 1 (diabetes mellitus, type 1)   ? ESRD (end stage renal disease) on dialysis   ? RLS (restless legs syndrome) - ropinorole, 0.25 mg at hs at hospital discharge - November, 2015   ? Retinal detachment   ? Gastroparesis   ? SHANNON - shortness of breath with exertion that is limiting.   ? Goiter   ? Insulin pump status   ? 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 Cerebral atrophy   ? Sebaceous cyst of ear   ? Morbid obesity with BMI of 40.0-44.9, adult   ? Lipoma of abdominal wall   ? Chest pain   ? Stroke - states that her whole left side of her body was numb - [place] [facility] [place]   ? Cataract   ? Chronic diarrhea   ? CTS (carpal tunnel syndrome) - both sides - **possible** - NCV does not clearly show this (February, 2017)   ? Breast mass, left breast - no evidence of breast mass on clinical examination of 4/7/17   ? Diarrhea of presumed infectious origin     Current Outpatient Prescriptions   Medication Sig   ? acetaminophen (TYLENOL) 325 MG tablet Take 650 mg by mouth every 6 (six) hours as needed for pain.   ? acetone, urine, test (ACETONE, URINE, TEST) Strp Check urine for ketones if blood sugar is above 250.  Call Dr Soliz if ketones are moderate or large.   ? alcohol swabs (ALCOHOL PADS) PadM Apply 1 Units topically daily. 70%   ? amLODIPine (NORVASC) 10 MG tablet Take 1 tablet (10 mg total) by mouth daily.   ? aspirin 81 MG EC tablet Take 81 mg by mouth daily.   ? blood glucose meter (GLUCOMETER) Use 1 each As Directed as needed. Dispense prodigy  "talking glucose meter   ? blood glucose test strips One each 4 times per day. Dispense prodigy strips for talking meter   ? calcium acetate (PHOSLO) 667 mg capsule Take 1 capsule (667 mg total) by mouth 3 (three) times a day. (Patient taking differently: Take 1 capsule by mouth 2 (two) times a day. Indications: Renal Osteodystrophy with Hyperphosphatemia)   ? cholecalciferol, vitamin D3, 1,000 unit tablet Take 1 tablet (1,000 Units total) by mouth daily.   ? CLASSIC PRENATAL 28 mg iron- 800 mcg Tab Take 1 tablet by mouth daily.   ? diphenoxylate-atropine (LOMOTIL) 2.5-0.025 mg per tablet Take 1 tablet by mouth 4 (four) times a day as needed for diarrhea.   ? erythromycin ophthalmic ointment Administer 1 application to the right eye 4 (four) times a day.   ? gabapentin (NEURONTIN) 300 MG capsule Take 1 3-4 time a day for diabetic neuropathy pain.   ? glucagon, human recombinant, (GLUCAGON) 1 mg injection Use as directed for hypoglycemia (Patient taking differently: Inject 1 each under the skin. Use as directed for hypoglycemia)   ? glucose (DEX4 GLUCOSE) 4 GM chewable tablet Chew 16 g as needed for low blood sugar.   ? insulin aspart (NOVOLOG FLEXPEN) 100 unit/mL injection pen Inject under the skin. Use in insulin pump up to 80 units daily   ? insulin lispro (HUMALOG KWIKPEN) 100 unit/mL pen Inject 1-12 Units, 3 times daily with meals (Patient taking differently: For use if insulin pump fails)   ? insulin needles, disposable, (BD INSULIN PEN NEEDLE UF SHORT) 31 X 5/16 \" Ndle Use As Directed 3 (three) times a day.   ? insulin pump cartridge Crtg 1 each Inject under the skin continuous. novolog insulin   ? insulin pump cartridge Inject under the skin continuous. Insulin pump discharge instructions from 10/15/16.  This is intended as additional information to the patient's PTA insulin pump order.  Continue with insulin pump at present settings.   ? insulin syringe-needle U-100 (BD INSULIN SYRINGE ULTRA-FINE) 0.3 mL 31 x " "5/16\" Syrg Test 3 times daily   ? LANCING DEVICE MISC Use As Directed.   ? LANTUS SOLOSTAR 100 unit/mL (3 mL) pen Inject 8 units BID if pump fails (Patient taking differently: Inject 12 Units under the skin as needed (Only used when pump was not working). )   ? losartan-hydrochlorothiazide (HYZAAR) 50-12.5 mg per tablet Take 1 tablet by mouth every day   ? LUBRICATING PLUS 0.5 % Dpet ophthalmic dropperette    ? metoclopramide (REGLAN) 5 MG tablet TAKE ONE TABLET BY MOUTH THREE TIMES A DAY BEFORE MEALS    ? metoprolol tartrate (LOPRESSOR) 50 MG tablet TAKE 1 TABLET (50 MG TOTAL) BY MOUTH 2 (TWO) TIMES A DAY.   ? omeprazole (PRILOSEC) 20 MG capsule TAKE ONE CAPSULE BY MOUTH ONE TIME DAILY 30 minutes before a meal to prevent heartburn   ? prenatal vit-iron fumarate-FA 28 mg iron- 800 mcg Tab Take 1 tablet by mouth daily.   ? rOPINIRole (REQUIP) 0.25 MG tablet Take 0.25 mg by mouth bedtime.   ? transparent dressings 2 3/8 X 2 3/4 \" Bndg Apply 1 patch topically every third day.     Social History     Social History   ? Marital status: Single     Spouse name: N/A   ? Number of children: N/A   ? Years of education: N/A     Occupational History   ? Not on file.     Social History Main Topics   ? Smoking status: Former Smoker     Packs/day: 0.50     Years: 0.50     Types: Cigarettes     Quit date: 1/1/1998   ? Smokeless tobacco: Never Used      Comment: She smoked for 6 months as a teenager.   ? Alcohol use No   ? Drug use: No   ? Sexual activity: No      Comment: lives with female partner of 10 years     Other Topics Concern   ? Not on file     Social History Narrative    Patient lives with her partner Krystyna and her daughter.     ______________________________________________________________________     History of present illness: Sabra Leigh is a pleasant 32 y.o. female who presents in clinic today for persistent diarrhea symptoms.  We had tried to test her for c-diff, but she returned a stool specimen with a formed " stool so the order was canceled and the specimen was rejected.  She states that that was an anomaly and that she continues to have diarrhea and states that it has become much more foul smelling as of late.  She wants to have this retested as she does get exposed to different things at dialysis.  She has been quite busy with learning Braille, cooking, and exercising now about twice per week, and seems to really enjoy this.  She also has a concern of left mid-tibial lower extremity lump that she would like to have evaluated.  She notes that it has been there for about 2 month. It is approximately 3-4 cm in length horizontally, non-mobile, firm lump - ? Superficial clot vs old hematoma, possibly.    Review of systems:   On ROS, the patient denies any chest pain or pressure, shortness of breath, abdominal pain, N/V. Positive for symptoms as noted in the HPI.    ______________________________________________________________________    Wt Readings from Last 3 Encounters:   08/21/17 (!) 222 lb 9.6 oz (101 kg)   08/15/17 (!) 222 lb 6.4 oz (100.9 kg)   07/06/17 216 lb 9.6 oz (98.2 kg)     BP Readings from Last 3 Encounters:   08/21/17 110/70   08/17/17 90/60   08/15/17 120/80     /70 (Patient Site: Right Arm, Patient Position: Sitting, Cuff Size: Adult Large)  Pulse 85  Wt (!) 222 lb 9.6 oz (101 kg)  BMI 44.96 kg/m2     Physical Exam:  General Appearance: Alert, cooperative, no distress, appears stated age  Head: Normocephalic, without obvious abnormality, atraumatic  Lungs: Clear to auscultation bilaterally, respirations unlabored  Heart: Regular rate and rhythm, S1 and S2 normal, no murmur, rub, or gallop  Abdomen: Soft, non-tender, bowel sounds hypoactive all four quadrants  Musculoskeletal: Normal range of motion. No joint swelling or deformity.   Extremities: Extremities normal, atraumatic, no cyanosis or edema  Neurologic: She is alert. She has normal reflexes.   Psychiatric: She has a normal mood and affect.        Alexys Parker, [facility]  Internal Medicine  [facility]     No Follow-up on file.         172.1